# Patient Record
Sex: FEMALE | Race: WHITE | NOT HISPANIC OR LATINO | Employment: PART TIME | URBAN - METROPOLITAN AREA
[De-identification: names, ages, dates, MRNs, and addresses within clinical notes are randomized per-mention and may not be internally consistent; named-entity substitution may affect disease eponyms.]

---

## 2017-01-18 ENCOUNTER — TELEPHONE (OUTPATIENT)
Dept: FAMILY MEDICINE | Facility: OTHER | Age: 47
End: 2017-01-18

## 2017-01-18 DIAGNOSIS — I10 HTN, GOAL BELOW 140/90: Primary | ICD-10-CM

## 2017-01-18 NOTE — TELEPHONE ENCOUNTER
Summary:    Patient is due/failing the following:   BMP, BP CHECK, MAMMOGRAM and PAP    Action needed:   Patient needs office visit for PAP ., Patient needs non-fasting lab only appointment and schedule a mammogram     Type of outreach:    Phone, left message for patient to call back.     Questions for provider review:    None                                                                                                                                    Lidia Jhaveri       Chart routed to Care Team .      Panel Management Review      Patient has the following on her problem list:     Depression / Dysthymia review  PHQ-9 SCORE 2/8/2016 3/28/2016 10/17/2016   Total Score - - -   Total Score 8 4 13      Patient is due for:  PHQ9    Hypertension   Last three blood pressure readings:  BP Readings from Last 3 Encounters:   10/17/16 160/100   04/20/16 120/64   04/12/16 130/68     Blood pressure: Failed    HTN Guidelines:  Age 18-59 BP range:  Less than 140/90  Age 60-85 with Diabetes:  Less than 140/90  Age 60-85 without Diabetes:  less than 150/90      Composite cancer screening  Chart review shows that this patient is due/due soon for the following Pap Smear and Mammogram

## 2017-01-18 NOTE — Clinical Note
[facility]  [address] [place] [id number]  Phone: [phone]  January 26, 2017      Maranda Figueroa  [address] [facility] [address]      Dear Maranda,    We care about your health and have reviewed your health plan including your medical conditions, medications, and lab results.  Based on this review, it is recommended that you follow up regarding the following health topic(s):  -High Blood Pressure  -Breast Cancer Screening  -Cervical Cancer Screening    We recommend you take the following action(s):  -schedule a FREE FLOAT MA-ONLY BLOOD PRESSURE APPOINTMENT within the next 1-4 weeks.  -schedule a LAB ONLY APPOINTMENT to recheck your: BMP (basic metabolic panel) within the next 1-4 weeks.  If' you have had labs completed eslewhere, please let us know so we can update your records.    -schedule a MAMMOGRAM which is due. Please disregard this reminder if you have had this exam elsewhere within the last 1-2 years please let us know so we can update your records.  -schedule a PAP SMEAR EXAM which is due.  Please disregard this reminder if you have had this exam elsewhere within the last year.  It would be helpful for us to have a copy of your recent pap smear report to update your records.     Please call us at the [facility] - [phone] (or use [employer]) to address the above recommendations.     Thank you for trusting [facility] and we appreciate the opportunity to serve you.  We look forward to supporting your healthcare needs in the future.    Healthy Regards,    Your Health Care Team  [facility]

## 2017-04-10 ENCOUNTER — TELEPHONE (OUTPATIENT)
Dept: FAMILY MEDICINE | Facility: OTHER | Age: 47
End: 2017-04-10

## 2017-04-10 NOTE — TELEPHONE ENCOUNTER
Summary:    Patient is due/failing the following:   FOLLOW UP, MAMMOGRAM, PAP and PHQ9    Action needed:   Patient needs office visit for PAP and follow up., Patient needs to do PHQ9. and schedule a mammogram     Type of outreach:    Phone, spoke to patient.  patient unsure when she will have time for this due to her work schedule. Patient will call back to schedule.     Questions for provider review:    None                                                                                                                                    Lidia Jhaveri       Chart routed to Care Team .      Panel Management Review      Patient has the following on her problem list:     Depression / Dysthymia review  PHQ-9 SCORE 2/8/2016 3/28/2016 10/17/2016   Total Score - - -   Total Score 8 4 13      Patient is due for:  PHQ9    Hypertension   Last three blood pressure readings:  BP Readings from Last 3 Encounters:   10/17/16 (!) 160/100   04/20/16 120/64   04/12/16 130/68     Blood pressure: FAILED    HTN Guidelines:  Age 18-59 BP range:  Less than 140/90  Age 60-85 with Diabetes:  Less than 140/90  Age 60-85 without Diabetes:  less than 150/90      Composite cancer screening  Chart review shows that this patient is due/due soon for the following Pap Smear and Mammogram

## 2017-04-10 NOTE — LETTER
[facility]  [address] [place] [id number]  Phone: [phone]  April 10, 2017      Maranda Figueroa  [address] [facility] [address]      Dear Maranda,    We care about your health and have reviewed your health plan including your medical conditions, medications, and lab results.  Based on this review, it is recommended that you follow up regarding the following health topic(s):  -Depression  -Breast Cancer Screening  -Cervical Cancer Screening    We recommend you take the following action(s):  -schedule a FOLLOWUP APPOINTMENT.  -schedule a MAMMOGRAM which is due. Please disregard this reminder if you have had this exam elsewhere within the last 1-2 years please let us know so we can update your records.  -schedule a PAP SMEAR EXAM which is due.  Please disregard this reminder if you have had this exam elsewhere within the last year.  It would be helpful for us to have a copy of your recent pap smear report to update your records.  -Complete and return the attached PHQ-9 Form.  If your total score is greater than 9, please schedule a followup appointment.  If you answer Yes to question 9, call your clinic between the hours of 8 to 5.  You may also call the Suicide Hotline at 2-408-112-SPWK (8737) any time.     Please call us at the [facility] - [phone] (or use [employer]) to address the above recommendations.     Thank you for trusting [facility] and we appreciate the opportunity to serve you.  We look forward to supporting your healthcare needs in the future.    Healthy Regards,    Your Health Care Team  [facility]

## 2017-04-10 NOTE — LETTER
[facility]  [address] [place] [address]  Phone: [phone]  May 17, 2017      Maranda Figueroa  [address] [facility] [address]      Dear Maranda,    We care about your health and have reviewed your health plan including your medical conditions, medications, and lab results.  Based on this review, it is recommended that you follow up regarding the following health topic(s):  -Depression    We recommend you take the following action(s):  -Complete and return the attached PHQ-9 Form.  If your total score is greater than 9, please schedule a followup appointment.  If you answer Yes to question 9, call your clinic between the hours of 8 to 5.  You may also call the Suicide Hotline at 4-151-946-GMQI (1085) any time.     Please call us at the [facility] - [phone] (or use [employer]) to address the above recommendations.     Thank you for trusting [facility] and we appreciate the opportunity to serve you.  We look forward to supporting your healthcare needs in the future.    Healthy Regards,    Your Health Care Team  [facility]

## 2017-06-22 ENCOUNTER — TELEPHONE (OUTPATIENT)
Dept: FAMILY MEDICINE | Facility: OTHER | Age: 47
End: 2017-06-22

## 2017-06-22 DIAGNOSIS — K21.9 GASTROESOPHAGEAL REFLUX DISEASE WITHOUT ESOPHAGITIS: Primary | ICD-10-CM

## 2017-06-22 NOTE — TELEPHONE ENCOUNTER
Patient requesting refill of Omeprazole 40mg daily prescribed by Dr. Kenyon initially, not on current med list.  Please review and prescribe if desired.  Aiyana Nunez, [facility]  [phone]

## 2017-07-05 NOTE — PROGRESS NOTES
SUBJECTIVE:                                                    Maranda Figueroa is a 47 year old female who presents to clinic today for the following health issues:    Headache  Onset: x 6 day,     Description:   Location: bilateral in the frontal area, bilateral in the temporal area, kind of in ear. About two weeks before last Saturday patient states she randomly got a headache at work but that only lasted two days.  Character: throbbing pain, numb pain  Frequency:  Started 6 days ago and hasn't gone away. Patient states Saturday was the worst.  Duration:  6 days    Intensity: 7/10    Progression of Symptoms:  Constant but has got a little better    Accompanying Signs & Symptoms:  Stiff neck: no   Neck or upper back pain: YES- sore  Fever: no  Sinus pressure: no  Nausea or vomiting: yes- nauseous, no appetite.  Dizziness: no  Numbness: YES- face and fingers  Weakness: YES  Visual changes: YES- Left eye and ear hurt- Left eye is blurry.    History:   Head trauma: no   Family history of migraines: no- patient had stress headaches in her 20's but hasn't had them in years.  Previous tests for headaches: YES- years ago  Neurologist evaluations: no  Able to do daily activities: YES- has too.  Wake with a headaches: YES- woke up Saturday with this headache and everyday since.  Do headaches wake you up: YES  Daily pain medication use: YES- ibuprofen, excedrin  Work/school stressors/changes: no    Precipitating factors:   Does light make it worse: YES  Does sound make it worse: YES    Alleviating factors:  Does sleep help: YES- little bit, patient states she has to be in the right position.    Therapies Tried and outcome: Ibuprofen (Advil, Motrin) and Excedrin- not helping    Patient reports a history of headaches years ago but has not recently struggled with them, she reports that she woke with the headache and some neck tension on the left about 6 days ago and has had waxing and waning symptoms since that time. She has  "tried OTC treatments without improvement. She denies any other neurologic symptoms, has not had vision changes, weakness or speech or balance changes. She is on medication for her blood pressure and reports that with the exception of this morning has taken her medication every morning.     -------------------------------------    Problem list and histories reviewed & adjusted, as indicated.  Additional history: as documented    BP Readings from Last 3 Encounters:   07/06/17 (!) 140/104   10/17/16 (!) 160/100   04/20/16 120/64    Wt Readings from Last 3 Encounters:   07/06/17 157 lb 9.6 oz (71.5 kg)   10/17/16 163 lb (73.9 kg)   04/20/16 159 lb (72.1 kg)        Reviewed and updated as needed this visit by clinical staff       Reviewed and updated as needed this visit by Provider         ROS:  Constitutional, HEENT, cardiovascular, pulmonary, gi and gu systems are negative, except as otherwise noted.    OBJECTIVE:     BP (!) 140/104  Pulse 74  Temp 99.1  F (37.3  C) (Temporal)  Resp 16  Ht 5' 2.21\" (1.58 m)  Wt 157 lb 9.6 oz (71.5 kg)  LMP 06/27/2017 (Exact Date)  Breastfeeding? No  BMI 28.64 kg/m2  Body mass index is 28.64 kg/(m^2).  GENERAL: alert and no distress  EYES: Eyes grossly normal to inspection, PERRL and conjunctivae and sclerae normal  HENT: normal cephalic/atraumatic, ear canals and TM's normal, nose and mouth without ulcers or lesions, oropharynx clear and oral mucous membranes moist  NECK: no adenopathy, thyroid normal to palpation and neck muscle tension and tenderness to the left paracervical and trapezius   RESP: lungs clear to auscultation - no rales, rhonchi or wheezes  CV: regular rates and rhythm, peripheral pulses strong and no peripheral edema  MS: no gross musculoskeletal defects noted, no edema  NEURO: Normal strength and tone, sensory exam grossly normal, mentation intact, speech normal and gait normal including heel/toe/tandem walking    Diagnostic Test Results:  none "     ASSESSMENT/PLAN:     Hypertension; slightly worsened    Patient off medication this morning and having an acute headache      Plan:  No changes in the patient's current treatment plan        ICD-10-CM    1. Acute intractable tension-type headache G44.201 cyclobenzaprine (FLEXERIL) 5 MG tablet     rizatriptan (MAXALT) 5 MG tablet     traMADol (ULTRAM) 50 MG tablet   2. HTN, goal below 140/90 I10        I will have patient resume BP medication and follow up for a recheck of pressure in 2-3 days.     For headache and muscle tension I will treat with pain and migraine medication as well as a muscle relaxant and if not resolving or if recurring I would have her follow up for imaging to further evaluate. I did review red flag symptoms including vision changes, acute worsening headache, speech changes, weakness or other acute worsening I would have her go to the ER for evaluation.     See Patient Instructions    Maite Blunt PA-C  [facility]

## 2017-07-06 ENCOUNTER — OFFICE VISIT (OUTPATIENT)
Dept: FAMILY MEDICINE | Facility: OTHER | Age: 47
End: 2017-07-06
Payer: COMMERCIAL

## 2017-07-06 VITALS
TEMPERATURE: 99.1 F | RESPIRATION RATE: 16 BRPM | DIASTOLIC BLOOD PRESSURE: 104 MMHG | HEIGHT: 62 IN | HEART RATE: 74 BPM | BODY MASS INDEX: 29 KG/M2 | SYSTOLIC BLOOD PRESSURE: 140 MMHG | WEIGHT: 157.6 LBS

## 2017-07-06 DIAGNOSIS — G44.201 ACUTE INTRACTABLE TENSION-TYPE HEADACHE: Primary | ICD-10-CM

## 2017-07-06 DIAGNOSIS — I10 HTN, GOAL BELOW 140/90: ICD-10-CM

## 2017-07-06 PROCEDURE — 99214 OFFICE O/P EST MOD 30 MIN: CPT | Performed by: PHYSICIAN ASSISTANT

## 2017-07-06 RX ORDER — RIZATRIPTAN BENZOATE 5 MG/1
5-10 TABLET ORAL
Qty: 18 TABLET | Refills: 3 | Status: SHIPPED | OUTPATIENT
Start: 2017-07-06 | End: 2018-01-10

## 2017-07-06 RX ORDER — TRAMADOL HYDROCHLORIDE 50 MG/1
50-100 TABLET ORAL EVERY 6 HOURS PRN
Qty: 20 TABLET | Refills: 0 | Status: SHIPPED | OUTPATIENT
Start: 2017-07-06 | End: 2018-01-10

## 2017-07-06 RX ORDER — CYCLOBENZAPRINE HCL 5 MG
5 TABLET ORAL
Qty: 30 TABLET | Refills: 1 | Status: SHIPPED | OUTPATIENT
Start: 2017-07-06 | End: 2018-01-10

## 2017-07-06 ASSESSMENT — PAIN SCALES - GENERAL: PAINLEVEL: SEVERE PAIN (7)

## 2017-07-06 NOTE — PATIENT INSTRUCTIONS
I will have you use a headache medication and as needed pain medication to help headache resolve. For neck muscle tension and spasm I would have you take a muscle relaxant at bedtime as needed. If you are having recurrent or continuing headache next week I would have you call and I would get you set up for imaging to further evaluate.   I would have you take your blood pressure medication and follow up to recheck blood pressure in 1-2 days.    If you have any acute worsening of headache, vision changes, weakness or other acute worsening of symptoms go to the ER for evaluation.       * HEADACHE [unspecified]    The cause of your headache today is not clear, but it does not appear to be the sign of any serious illness.  Under stress, some people tense the muscles of their shoulder, neck and scalp without knowing it. If this condition lasts long enough, a TENSION HEADACHE can occur.  A MIGRAINE HEADACHE is caused by changes in blood flow to the brain. It can be mild or severe. A migraine attack may be triggered by emotional stress, hormone changes during the menstrual cycle, oral contraceptives, alcohol use, certain foods containing tyramine, eye strain, weather changes, missing meals, lack of sleep or oversleeping.  Other causes of headache include a viral illness, sinus, ear or throat infection, dental pain and TMJ (jaw joint) pain.  HOME CARE:    If you were given pain medicine for this headache, do not drive yourself home. Arrange for a ride, instead. When you get home, try to sleep. You should feel much better when you wake up.    If you are having nausea or vomiting, follow a light diet until your headache is relieved.    If you have a migraine type headache, use sunglasses when in the daylight or around bright indoor lighting until symptoms improve. Bright glaring light can worsen this kind of headache.  FOLLOW UP with your doctor if the headache is not better within the next 24 hours. If you have frequent  headaches you should discuss a treatment plan with your primary care doctor. By being aware of the earliest signs of headache, and starting treatment right away, you may be able to stop the pain yourself.  GET PROMPT MEDICAL ATTENTION if any of the following occur:    Worsening of your head pain or no improvement within 24 hours    Repeated vomiting (unable to keep liquids down)    Fever over 101 F (38.3 C)    Stiff neck    Extreme drowsiness, confusion or fainting    Weakness of an arm or leg or one side of the face    Difficulty with speech or vision    [id number] [address] [address]. All rights reserved. This information is not intended as a substitute for professional medical care. Always follow your healthcare professional's instructions.

## 2017-07-06 NOTE — NURSING NOTE
"Chief Complaint   Patient presents with     Headache     Panel Management     PAP, BMP, PHQ9       Initial BP (!) 138/94  Pulse 74  Temp 99.1  F (37.3  C) (Temporal)  Resp 16  Ht 5' 2.21\" (1.58 m)  Wt 157 lb 9.6 oz (71.5 kg)  LMP 06/27/2017 (Exact Date)  Breastfeeding? No  BMI 28.64 kg/m2 Estimated body mass index is 28.64 kg/(m^2) as calculated from the following:    Height as of this encounter: 5' 2.21\" (1.58 m).    Weight as of this encounter: 157 lb 9.6 oz (71.5 kg).  Medication Reconciliation: complete    "

## 2017-07-06 NOTE — MR AVS SNAPSHOT
After Visit Summary   7/6/2017    Maranda Figueroa    MRN: [id number]           Patient Information     Date Of Birth          1970        Visit Information        Provider Department      7/6/2017 8:40 AM Maite Blunt PA-C [facility]        Today's Diagnoses     Acute intractable tension-type headache    -  1    HTN, goal below 140/90          Care Instructions    I will have you use a headache medication and as needed pain medication to help headache resolve. For neck muscle tension and spasm I would have you take a muscle relaxant at bedtime as needed. If you are having recurrent or continuing headache next week I would have you call and I would get you set up for imaging to further evaluate.   I would have you take your blood pressure medication and follow up to recheck blood pressure in 1-2 days.    If you have any acute worsening of headache, vision changes, weakness or other acute worsening of symptoms go to the ER for evaluation.       * HEADACHE [unspecified]    The cause of your headache today is not clear, but it does not appear to be the sign of any serious illness.  Under stress, some people tense the muscles of their shoulder, neck and scalp without knowing it. If this condition lasts long enough, a TENSION HEADACHE can occur.  A MIGRAINE HEADACHE is caused by changes in blood flow to the brain. It can be mild or severe. A migraine attack may be triggered by emotional stress, hormone changes during the menstrual cycle, oral contraceptives, alcohol use, certain foods containing tyramine, eye strain, weather changes, missing meals, lack of sleep or oversleeping.  Other causes of headache include a viral illness, sinus, ear or throat infection, dental pain and TMJ (jaw joint) pain.  HOME CARE:    If you were given pain medicine for this headache, do not drive yourself home. Arrange for a ride, instead. When you get home, try to sleep. You should feel much better  when you wake up.    If you are having nausea or vomiting, follow a light diet until your headache is relieved.    If you have a migraine type headache, use sunglasses when in the daylight or around bright indoor lighting until symptoms improve. Bright glaring light can worsen this kind of headache.  FOLLOW UP with your doctor if the headache is not better within the next 24 hours. If you have frequent headaches you should discuss a treatment plan with your primary care doctor. By being aware of the earliest signs of headache, and starting treatment right away, you may be able to stop the pain yourself.  GET PROMPT MEDICAL ATTENTION if any of the following occur:    Worsening of your head pain or no improvement within 24 hours    Repeated vomiting (unable to keep liquids down)    Fever over 101 F (38.3 C)    Stiff neck    Extreme drowsiness, confusion or fainting    Weakness of an arm or leg or one side of the face    Difficulty with speech or vision    7881-9748 [place] [address]. All rights reserved. This information is not intended as a substitute for professional medical care. Always follow your healthcare professional's instructions.            Follow-ups after your visit        Follow-up notes from your care team     Return if symptoms worsen or fail to improve.      Who to contact     If you have questions or need follow up information about today's clinic visit or your schedule please contact [facility] directly at [phone].  Normal or non-critical lab and imaging results will be communicated to you by MyChart, letter or phone within 4 business days after the clinic has received the results. If you do not hear from us within 7 days, please contact the clinic through MyChart or phone. If you have a critical or abnormal lab result, we will notify you by phone as soon as possible.  Submit refill requests through [employer] or call your pharmacy and they  "will forward the refill request to us. Please allow 3 business days for your refill to be completed.          Additional Information About Your Visit        [employer] Information     daysoft lets you send messages to your doctor, view your test results, renew your prescriptions, schedule appointments and more. To sign up, go to [email or web address] . Click on \"Log in\" on the left side of the screen, which will take you to the Welcome page. Then click on \"Sign up Now\" on the right side of the page.     You will be asked to enter the access code listed below, as well as some personal information. Please follow the directions to create your username and password.     Your access code is: [email or web address]  Expires: 10/4/2017  9:26 AM     Your access code will  in 90 days. If you need help or a new code, please call your [facility] clinic or [phone].        Care EveryWhere ID     This is your [facility] EveryWhere ID. This could be used by other organizations to access your [facility] medical records  [id number]        Your Vitals Were     Pulse Temperature Respirations Height Last Period Breastfeeding?    74 99.1  F (37.3  C) (Temporal) 16 5' 2.21\" (1.58 m) 2017 (Exact Date) No    BMI (Body Mass Index)                   28.64 kg/m2            Blood Pressure from Last 3 Encounters:   17 (!) 140/104   10/17/16 (!) 160/100   16 120/64    Weight from Last 3 Encounters:   17 157 lb 9.6 oz (71.5 kg)   10/17/16 163 lb (73.9 kg)   16 159 lb (72.1 kg)              Today, you had the following     No orders found for display         Today's Medication Changes          These changes are accurate as of: 17  9:26 AM.  If you have any questions, ask your nurse or doctor.               Start taking these medicines.        Dose/Directions    cyclobenzaprine 5 MG tablet   Commonly known as:  FLEXERIL   Used for:  Acute intractable tension-type headache   Started by:  Maite Blunt PA-C        " Dose:  5 mg   Take 1 tablet (5 mg) by mouth nightly as needed for muscle spasms (neck tension)   Quantity:  30 tablet   Refills:  1       rizatriptan 5 MG tablet   Commonly known as:  MAXALT   Used for:  Acute intractable tension-type headache   Started by:  Maite Blunt PA-C        Dose:  5-10 mg   Take 1-2 tablets (5-10 mg) by mouth at onset of headache for migraine May repeat in 2 hours. Max 6 tablets/24 hours.   Quantity:  18 tablet   Refills:  3       traMADol 50 MG tablet   Commonly known as:  ULTRAM   Used for:  Acute intractable tension-type headache   Started by:  Maite Blunt PA-C        Dose:   mg   Take 1-2 tablets ( mg) by mouth every 6 hours as needed for breakthrough pain maximum 4 tablet(s) per day   Quantity:  20 tablet   Refills:  0            Where to get your medicines      These medications were sent to [facility]  [address] - [address]   [address] [place] [address]     Phone:  [phone]     cyclobenzaprine 5 MG tablet    rizatriptan 5 MG tablet         Some of these will need a paper prescription and others can be bought over the counter.  Ask your nurse if you have questions.     Bring a paper prescription for each of these medications     traMADol 50 MG tablet                Primary Care Provider Office Phone # Fax #    Diana Clara Matias [phone]        [facility] [address] [place] [address]        Equal Access to Services     MARIBELL BRANHAM AH: Hadii edvin ku hadasho Soomaali, waaxda luqadaha, qaybta kaalmada adeegyada, waxay idiin hayjeannie alfredo. So [facility] [phone].    ATENCIÓN: Si habla viraj, tiene a moreira disposición servicios gratuitos de asistencia lingüística. Llame al [phone].    We comply with applicable federal civil rights laws and [place] laws. We do not discriminate on the basis of race, color, national origin, age, disability sex, sexual orientation  or gender identity.            Thank you!     Thank you for choosing [facility]  for your care. Our goal is always to provide you with excellent care. Hearing back from our patients is one way we can continue to improve our services. Please take a few minutes to complete the written survey that you may receive in the mail after your visit with us. Thank you!             Your Updated Medication List - Protect others around you: Learn how to safely use, store and throw away your medicines at www.disposemymeds.org.          This list is accurate as of: 7/6/17  9:26 AM.  Always use your most recent med list.                   Brand Name Dispense Instructions for use Diagnosis    buPROPion 150 MG 12 hr tablet    WELLBUTRIN SR    60 tablet    Take 1 tablet once daily and increase to 1 tablet twice daily after 4 to 7 days    Major depressive disorder, recurrent episode, moderate (H), SILVIA (generalized anxiety disorder)       cyclobenzaprine 5 MG tablet    FLEXERIL    30 tablet    Take 1 tablet (5 mg) by mouth nightly as needed for muscle spasms (neck tension)    Acute intractable tension-type headache       diltiazem 180 MG 24 hr capsule    CARDIZEM CD    30 capsule    Take 1 capsule (180 mg) by mouth daily    Essential hypertension with goal blood pressure less than 140/90       omeprazole 20 MG CR capsule    priLOSEC    90 capsule    Take 1 capsule (20 mg) by mouth daily    Gastroesophageal reflux disease without esophagitis       rizatriptan 5 MG tablet    MAXALT    18 tablet    Take 1-2 tablets (5-10 mg) by mouth at onset of headache for migraine May repeat in 2 hours. Max 6 tablets/24 hours.    Acute intractable tension-type headache       traMADol 50 MG tablet    ULTRAM    20 tablet    Take 1-2 tablets ( mg) by mouth every 6 hours as needed for breakthrough pain maximum 4 tablet(s) per day    Acute intractable tension-type headache

## 2017-07-07 ASSESSMENT — ANXIETY QUESTIONNAIRES
IF YOU CHECKED OFF ANY PROBLEMS ON THIS QUESTIONNAIRE, HOW DIFFICULT HAVE THESE PROBLEMS MADE IT FOR YOU TO DO YOUR WORK, TAKE CARE OF THINGS AT HOME, OR GET ALONG WITH OTHER PEOPLE: SOMEWHAT DIFFICULT
7. FEELING AFRAID AS IF SOMETHING AWFUL MIGHT HAPPEN: MORE THAN HALF THE DAYS
1. FEELING NERVOUS, ANXIOUS, OR ON EDGE: MORE THAN HALF THE DAYS
6. BECOMING EASILY ANNOYED OR IRRITABLE: SEVERAL DAYS
5. BEING SO RESTLESS THAT IT IS HARD TO SIT STILL: SEVERAL DAYS
GAD7 TOTAL SCORE: 12
2. NOT BEING ABLE TO STOP OR CONTROL WORRYING: MORE THAN HALF THE DAYS
3. WORRYING TOO MUCH ABOUT DIFFERENT THINGS: MORE THAN HALF THE DAYS

## 2017-07-07 ASSESSMENT — PATIENT HEALTH QUESTIONNAIRE - PHQ9: 5. POOR APPETITE OR OVEREATING: MORE THAN HALF THE DAYS

## 2017-07-08 ASSESSMENT — PATIENT HEALTH QUESTIONNAIRE - PHQ9: SUM OF ALL RESPONSES TO PHQ QUESTIONS 1-9: 9

## 2017-07-08 ASSESSMENT — ANXIETY QUESTIONNAIRES: GAD7 TOTAL SCORE: 12

## 2017-07-13 ENCOUNTER — APPOINTMENT (OUTPATIENT)
Dept: CT IMAGING | Facility: CLINIC | Age: 47
End: 2017-07-13
Attending: PHYSICIAN ASSISTANT
Payer: COMMERCIAL

## 2017-07-13 ENCOUNTER — HOSPITAL ENCOUNTER (EMERGENCY)
Facility: CLINIC | Age: 47
Discharge: HOME | End: 2017-07-13
Attending: PHYSICIAN ASSISTANT | Admitting: PHYSICIAN ASSISTANT
Payer: COMMERCIAL

## 2017-07-13 ENCOUNTER — TELEPHONE (OUTPATIENT)
Dept: FAMILY MEDICINE | Facility: OTHER | Age: 47
End: 2017-07-13

## 2017-07-13 VITALS
TEMPERATURE: 97.1 F | SYSTOLIC BLOOD PRESSURE: 157 MMHG | BODY MASS INDEX: 28.82 KG/M2 | RESPIRATION RATE: 16 BRPM | DIASTOLIC BLOOD PRESSURE: 98 MMHG | OXYGEN SATURATION: 99 % | WEIGHT: 158.6 LBS | HEART RATE: 77 BPM

## 2017-07-13 VITALS — HEART RATE: 84 BPM | DIASTOLIC BLOOD PRESSURE: 100 MMHG | SYSTOLIC BLOOD PRESSURE: 146 MMHG

## 2017-07-13 DIAGNOSIS — R51.9 INTRACTABLE EPISODIC HEADACHE, UNSPECIFIED HEADACHE TYPE: ICD-10-CM

## 2017-07-13 LAB
ANION GAP SERPL CALCULATED.3IONS-SCNC: 12 MMOL/L (ref 3–14)
BASOPHILS # BLD AUTO: 0.1 10E9/L (ref 0–0.2)
BASOPHILS NFR BLD AUTO: 0.7 %
BUN SERPL-MCNC: 7 MG/DL (ref 7–30)
CALCIUM SERPL-MCNC: 8.8 MG/DL (ref 8.5–10.1)
CHLORIDE SERPL-SCNC: 103 MMOL/L (ref 94–109)
CO2 SERPL-SCNC: 25 MMOL/L (ref 20–32)
CREAT SERPL-MCNC: 0.59 MG/DL (ref 0.52–1.04)
CRP SERPL-MCNC: <2.9 MG/L (ref 0–8)
DIFFERENTIAL METHOD BLD: NORMAL
EOSINOPHIL # BLD AUTO: 0.1 10E9/L (ref 0–0.7)
EOSINOPHIL NFR BLD AUTO: 0.7 %
ERYTHROCYTE [DISTWIDTH] IN BLOOD BY AUTOMATED COUNT: 13 % (ref 10–15)
ERYTHROCYTE [SEDIMENTATION RATE] IN BLOOD BY WESTERGREN METHOD: 7 MM/H (ref 0–20)
GFR SERPL CREATININE-BSD FRML MDRD: ABNORMAL ML/MIN/1.7M2
GLUCOSE SERPL-MCNC: 105 MG/DL (ref 70–99)
HCT VFR BLD AUTO: 42.7 % (ref 35–47)
HGB BLD-MCNC: 14.3 G/DL (ref 11.7–15.7)
IMM GRANULOCYTES # BLD: 0 10E9/L (ref 0–0.4)
IMM GRANULOCYTES NFR BLD: 0.1 %
LYMPHOCYTES # BLD AUTO: 1.2 10E9/L (ref 0.8–5.3)
LYMPHOCYTES NFR BLD AUTO: 17.2 %
MCH RBC QN AUTO: 31.7 PG (ref 26.5–33)
MCHC RBC AUTO-ENTMCNC: 33.5 G/DL (ref 31.5–36.5)
MCV RBC AUTO: 95 FL (ref 78–100)
MONOCYTES # BLD AUTO: 0.6 10E9/L (ref 0–1.3)
MONOCYTES NFR BLD AUTO: 7.9 %
NEUTROPHILS # BLD AUTO: 5.1 10E9/L (ref 1.6–8.3)
NEUTROPHILS NFR BLD AUTO: 73.4 %
PLATELET # BLD AUTO: 244 10E9/L (ref 150–450)
POTASSIUM SERPL-SCNC: 4.1 MMOL/L (ref 3.4–5.3)
RBC # BLD AUTO: 4.51 10E12/L (ref 3.8–5.2)
SODIUM SERPL-SCNC: 140 MMOL/L (ref 133–144)
WBC # BLD AUTO: 7 10E9/L (ref 4–11)

## 2017-07-13 PROCEDURE — 86140 C-REACTIVE PROTEIN: CPT | Performed by: PHYSICIAN ASSISTANT

## 2017-07-13 PROCEDURE — 36415 COLL VENOUS BLD VENIPUNCTURE: CPT | Performed by: PHYSICIAN ASSISTANT

## 2017-07-13 PROCEDURE — 85025 COMPLETE CBC W/AUTO DIFF WBC: CPT | Performed by: PHYSICIAN ASSISTANT

## 2017-07-13 PROCEDURE — 80048 BASIC METABOLIC PNL TOTAL CA: CPT | Performed by: PHYSICIAN ASSISTANT

## 2017-07-13 PROCEDURE — 25000128 H RX IP 250 OP 636: Performed by: PHYSICIAN ASSISTANT

## 2017-07-13 PROCEDURE — 99284 EMERGENCY DEPT VISIT MOD MDM: CPT | Mod: 25 | Performed by: PHYSICIAN ASSISTANT

## 2017-07-13 PROCEDURE — 70450 CT HEAD/BRAIN W/O DYE: CPT

## 2017-07-13 PROCEDURE — 86618 LYME DISEASE ANTIBODY: CPT | Performed by: PHYSICIAN ASSISTANT

## 2017-07-13 PROCEDURE — 96361 HYDRATE IV INFUSION ADD-ON: CPT | Performed by: PHYSICIAN ASSISTANT

## 2017-07-13 PROCEDURE — 99284 EMERGENCY DEPT VISIT MOD MDM: CPT | Mod: Z6 | Performed by: PHYSICIAN ASSISTANT

## 2017-07-13 PROCEDURE — 96375 TX/PRO/DX INJ NEW DRUG ADDON: CPT | Performed by: PHYSICIAN ASSISTANT

## 2017-07-13 PROCEDURE — 96365 THER/PROPH/DIAG IV INF INIT: CPT | Performed by: PHYSICIAN ASSISTANT

## 2017-07-13 PROCEDURE — 85652 RBC SED RATE AUTOMATED: CPT | Performed by: PHYSICIAN ASSISTANT

## 2017-07-13 RX ORDER — SODIUM CHLORIDE 9 MG/ML
1000 INJECTION, SOLUTION INTRAVENOUS CONTINUOUS
Status: DISCONTINUED | OUTPATIENT
Start: 2017-07-13 | End: 2017-07-13 | Stop reason: HOSPADM

## 2017-07-13 RX ORDER — MAGNESIUM SULFATE 1 G/100ML
1 INJECTION INTRAVENOUS ONCE
Status: DISCONTINUED | OUTPATIENT
Start: 2017-07-13 | End: 2017-07-13

## 2017-07-13 RX ORDER — KETOROLAC TROMETHAMINE 30 MG/ML
30 INJECTION, SOLUTION INTRAMUSCULAR; INTRAVENOUS ONCE
Status: COMPLETED | OUTPATIENT
Start: 2017-07-13 | End: 2017-07-13

## 2017-07-13 RX ORDER — DEXAMETHASONE SODIUM PHOSPHATE 10 MG/ML
10 INJECTION, SOLUTION INTRAMUSCULAR; INTRAVENOUS ONCE
Status: COMPLETED | OUTPATIENT
Start: 2017-07-13 | End: 2017-07-13

## 2017-07-13 RX ADMIN — SODIUM CHLORIDE 1000 ML: 9 INJECTION, SOLUTION INTRAVENOUS at 14:31

## 2017-07-13 RX ADMIN — MAGNESIUM SULFATE IN DEXTROSE 1 G: 10 INJECTION, SOLUTION INTRAVENOUS at 14:39

## 2017-07-13 RX ADMIN — KETOROLAC TROMETHAMINE 30 MG: 30 INJECTION, SOLUTION INTRAMUSCULAR at 14:30

## 2017-07-13 RX ADMIN — DEXAMETHASONE SODIUM PHOSPHATE 10 MG: 10 INJECTION, SOLUTION INTRAMUSCULAR; INTRAVENOUS at 14:34

## 2017-07-13 ASSESSMENT — ENCOUNTER SYMPTOMS
CHILLS: 0
WEAKNESS: 0
TROUBLE SWALLOWING: 0
DIZZINESS: 0
SINUS PRESSURE: 0
FACIAL ASYMMETRY: 0
SHORTNESS OF BREATH: 0
NUMBNESS: 1
ABDOMINAL PAIN: 0
HEADACHES: 1
NAUSEA: 1
NECK PAIN: 1
NECK STIFFNESS: 0
VOMITING: 0
FEVER: 0

## 2017-07-13 NOTE — DISCHARGE INSTRUCTIONS
Please follow-up next week in the clinic for reevaluation of your headache.  Continue using your home headache medications as needed.  Take the next couple days off of work and rest at home.  Return to the emergency department immediately for worsening symptoms.     Thank you for choosing [facility] Emergency Department. It was a pleasure taking care of you today. If you have any questions, please call [phone].    Kena Bonilla PA-C    * HEADACHE    The cause of your headache today is not clear, but it does not appear to be the sign of any serious illness.  Under stress, some people tense the muscles of their shoulder, neck and scalp without knowing it. If this condition lasts long enough, a TENSION HEADACHE can occur.  A MIGRAINE HEADACHE is caused by changes in blood flow to the brain. It can be mild or severe. A migraine attack may be triggered by emotional stress, hormone changes during the menstrual cycle, oral contraceptives, alcohol use, certain foods containing tyramine, eye strain, weather changes, missing meals, lack of sleep or oversleeping.  Other causes of headache include a viral illness, sinus, ear or throat infection, dental pain and TMJ (jaw joint) pain.  HOME CARE:    If you were given pain medicine for this headache, do not drive yourself home. Arrange for a ride, instead. When you get home, try to sleep. You should feel much better when you wake up.    If you are having nausea or vomiting, follow a light diet until your headache is relieved.    If you have a migraine type headache, use sunglasses when in the daylight or around bright indoor lighting until symptoms improve. Bright glaring light can worsen this kind of headache.  FOLLOW UP with your doctor if the headache is not better within the next 24 hours. If you have frequent headaches you should discuss a treatment plan with your primary care doctor. By being aware of the earliest signs of headache, and starting treatment  right away, you may be able to stop the pain yourself.  GET PROMPT MEDICAL ATTENTION if any of the following occur:    Worsening of your head pain or no improvement within 24 hours    Repeated vomiting (unable to keep liquids down)    Fever over 101 F (38.3 C)    Stiff neck    Extreme drowsiness, confusion or fainting    Weakness of an arm or leg or one side of the face    Difficulty with speech or vision

## 2017-07-13 NOTE — TELEPHONE ENCOUNTER
[facility] phone call message- patient reporting a symptom:    Symptom or request: headache    Duration (how long have symptoms been present): one month  Have you been treated for this before? Yes    Additional comments: do you think she should get in for a MRI today/    Call taken on 7/13/2017 at 11:14 AM by Izzy Gaines

## 2017-07-13 NOTE — ED AVS SNAPSHOT
[facility] Emergency Department    [address] [place] [address]    Phone:  [phone]    Fax:  [phone]                                       Maranda Figueroa   MRN: [id number]    Department:  [facility] Emergency Department   Date of Visit:  7/13/2017           After Visit Summary Signature Page     I have received my discharge instructions, and my questions have been answered. I have discussed any challenges I see with this plan with the nurse or doctor.    ..........................................................................................................................................  Patient/Patient Representative Signature      ..........................................................................................................................................  Patient Representative Print Name and Relationship to Patient    ..................................................               ................................................  Date                                            Time    ..........................................................................................................................................  Reviewed by Signature/Title    ...................................................              ..............................................  Date                                                            Time

## 2017-07-13 NOTE — ED PROVIDER NOTES
History     Chief Complaint   Patient presents with     Headache     HPI  Maranda Figueroa is a 47 year old female who presents to the emergency department complaining of a headache.  She has had this headache constantly for the last couple of weeks, but it waxes and wanes in severity.  She describes it as a tight, numb feeling on the left side of her face around her eye.  The face has a weird numb feeling and her left eye seems to have a little bit of blurry vision.  The pain does not radiate.  She does have mild left ear pain as well.  She has a history of surgeries on the ear due to deafness.  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 She was seen in the clinic one week ago for this headache and prescribed Maxalt, Ultram, and Flexeril.  She took Ultram and Maxalt today without relief. Headache had almost resolved yesterday and she thought she was on the mend, but it never fully went away and today it is much worse. She has had occasional nausea but no vomiting with the headache.  She denies fever or chills.  She has occasional numbness in her left arm when she sleeps that she says has been ongoing for a while.  Denies numbness in her arms or legs currently or weakness in extremities.  She notes some neck pain as well but no neck stiffness.  She does not usually have headaches.  2 weeks prior to the onset of this headache she had a sharp, left-sided frontal headache that lasted 2 days and then went away on its own.    I have reviewed the Medications, Allergies, Past Medical and Surgical History, and Social History in the Epic system.    Allergies:   Allergies   Allergen Reactions     Penicillins      Causes nausea with emesis         No current facility-administered medications on file prior to encounter.   Current Outpatient Prescriptions on File Prior to Encounter:  cyclobenzaprine (FLEXERIL) 5 MG tablet Take 1 tablet (5 mg) by mouth nightly as needed for muscle spasms (neck tension)   rizatriptan (MAXALT) 5 MG tablet Take  "1-2 tablets (5-10 mg) by mouth at onset of headache for migraine May repeat in 2 hours. Max 6 tablets/24 hours.   traMADol (ULTRAM) 50 MG tablet Take 1-2 tablets ( mg) by mouth every 6 hours as needed for breakthrough pain maximum 4 tablet(s) per day   omeprazole (PRILOSEC) 20 MG CR capsule Take 1 capsule (20 mg) by mouth daily   diltiazem (CARDIZEM CD) 180 MG 24 hr CD capsule Take 1 capsule (180 mg) by mouth daily   buPROPion (WELLBUTRIN SR) 150 MG 12 hr tablet Take 1 tablet once daily and increase to 1 tablet twice daily after 4 to 7 days       Patient Active Problem List   Diagnosis     Anxiety state     CARDIOVASCULAR SCREENING; LDL GOAL LESS THAN 160     Major depression in complete remission (H)     Hearing impairment     Insomnia     HTN, goal below 140/90     Vitamin D deficiency     Gastroesophageal reflux disease without esophagitis     Panic attack       Past Surgical History:   Procedure Laterality Date     ESOPHAGOSCOPY, GASTROSCOPY, DUODENOSCOPY (EGD), COMBINED N/A 10/5/2015    Procedure: COMBINED ESOPHAGOSCOPY, GASTROSCOPY, DUODENOSCOPY (EGD), BIOPSY SINGLE OR MULTIPLE;  Surgeon: Calin Kenyon MD;  Location: [facility]     OPEN REDUCTION INTERNAL FIXATION RODDING INTRAMEDULLARY TIBIA  11/6/2011    Procedure:OPEN REDUCTION INTERNAL FIXATION RODDING INTRAMEDULLARY TIBIA; Open Reduction internal fixation rodding right tibia; Surgeon:JUNO TUTTLE; Location:[facility]     TYMPANOPLASTY, RT/LT      Tympanoplasty/LT       Social History   Substance Use Topics     Smoking status: Former Smoker     Quit date: 6/1/1994     Smokeless tobacco: Not on file     Alcohol use 1.0 oz/week      Comment: Occ.       Most Recent Immunizations   Administered Date(s) Administered     TDAP Vaccine (Adacel) 11/19/2013       BMI: Estimated body mass index is 28.82 kg/(m^2) as calculated from the following:    Height as of 7/6/17: 1.58 m (5' 2.21\").    Weight as of this encounter: 71.9 kg (158 lb 9.6 oz).      Review of " Systems   Constitutional: Negative for chills and fever.   HENT: Positive for ear pain (left ear) and hearing loss (left ear, chronic). Negative for sinus pressure and trouble swallowing.    Eyes: Positive for visual disturbance (left eye blurry).   Respiratory: Negative for shortness of breath.    Cardiovascular: Negative for chest pain.   Gastrointestinal: Positive for nausea (occasional, not currently). Negative for abdominal pain and vomiting.   Musculoskeletal: Positive for neck pain. Negative for neck stiffness.   Neurological: Positive for numbness (left face, intermittent left arm) and headaches. Negative for dizziness, facial asymmetry and weakness.   All other systems reviewed and are negative.      Physical Exam   BP: (!) 172/106  Heart Rate: 94  Temp: 97.1  F (36.2  C)  Resp: 16  Weight: 71.9 kg (158 lb 9.6 oz)  SpO2: 97 %  Physical Exam   Constitutional: She is oriented to person, place, and time. She appears well-developed and well-nourished. No distress.   HENT:   Head: Normocephalic and atraumatic.   Right Ear: Tympanic membrane and external ear normal.   Left Ear: External ear normal.   Nose: Nose normal.   Mouth/Throat: Uvula is midline and oropharynx is clear and moist. No oropharyngeal exudate.   Left TM and canal with post-surgical changes, no evidence of infection   Eyes: Conjunctivae and EOM are normal. Pupils are equal, round, and reactive to light. No scleral icterus.   Neck: Normal range of motion. Neck supple.   Cardiovascular: Normal rate, regular rhythm, normal heart sounds and intact distal pulses.    Pulmonary/Chest: Effort normal and breath sounds normal. No respiratory distress.   Abdominal: Soft. Bowel sounds are normal. There is no tenderness.   Musculoskeletal: She exhibits no edema or tenderness.   Neurological: She is alert and oriented to person, place, and time. She has normal strength. A cranial nerve deficit (patient reports abnormal sensation on left side of face, left ear  hearing deficit (normal for patient), otherwise CN intact) and sensory deficit (left face on lateral aspect of eye) is present. Coordination normal.   Skin: Skin is warm and dry. No rash noted. She is not diaphoretic.   Psychiatric: She has a normal mood and affect.   Nursing note and vitals reviewed.      ED Course     ED Course     Procedures  None    Results for orders placed or performed during the hospital encounter of 07/13/17 (from the past 24 hour(s))   CRP inflammation   Result Value Ref Range    CRP Inflammation <2.9 0.0 - 8.0 mg/L   Erythrocyte sedimentation rate auto   Result Value Ref Range    Sed Rate 7 0 - 20 mm/h   CBC with platelets differential   Result Value Ref Range    WBC 7.0 4.0 - 11.0 10e9/L    RBC Count 4.51 3.8 - 5.2 10e12/L    Hemoglobin 14.3 11.7 - 15.7 g/dL    Hematocrit 42.7 35.0 - 47.0 %    MCV 95 78 - 100 fl    MCH 31.7 26.5 - 33.0 pg    MCHC 33.5 31.5 - 36.5 g/dL    RDW 13.0 10.0 - 15.0 %    Platelet Count 244 150 - 450 10e9/L    Diff Method Automated Method     % Neutrophils 73.4 %    % Lymphocytes 17.2 %    % Monocytes 7.9 %    % Eosinophils 0.7 %    % Basophils 0.7 %    % Immature Granulocytes 0.1 %    Absolute Neutrophil 5.1 1.6 - 8.3 10e9/L    Absolute Lymphocytes 1.2 0.8 - 5.3 10e9/L    Absolute Monocytes 0.6 0.0 - 1.3 10e9/L    Absolute Eosinophils 0.1 0.0 - 0.7 10e9/L    Absolute Basophils 0.1 0.0 - 0.2 10e9/L    Abs Immature Granulocytes 0.0 0 - 0.4 10e9/L   CT Head w/o Contrast    Narrative    CT HEAD W/O CONTRAST  7/13/2017 2:01 PM    HISTORY: Headache and left facial numbness.    TECHNIQUE: Scans were obtained through the head without IV contrast.   Radiation dose for this scan was reduced using automated exposure  control, adjustment of the mA and/or kV according to patient size, or  iterative reconstruction technique.    COMPARISON: None.    FINDINGS: No hemorrhage, mass lesion, or focal area of acute  infarction identified. Paranasal sinuses are normal. No  bony  abnormality.      Impression    IMPRESSION: Negative CT scan of the head.    VANESSA JONES MD   Basic metabolic panel   Result Value Ref Range    Sodium 140 133 - 144 mmol/L    Potassium 4.1 3.4 - 5.3 mmol/L    Chloride 103 94 - 109 mmol/L    Carbon Dioxide 25 20 - 32 mmol/L    Anion Gap 12 3 - 14 mmol/L    Glucose 105 (H) 70 - 99 mg/dL    Urea Nitrogen 7 7 - 30 mg/dL    Creatinine 0.59 0.52 - 1.04 mg/dL    GFR Estimate >90  Non  GFR Calc   >60 mL/min/1.7m2    GFR Estimate If Black >90   GFR Calc   >60 mL/min/1.7m2    Calcium 8.8 8.5 - 10.1 mg/dL       Medications   0.9% sodium chloride BOLUS (0 mLs Intravenous Stopped 7/13/17 1606)     Followed by   0.9% sodium chloride infusion (not administered)   dexamethasone (DECADRON) injection 10 mg (10 mg Intravenous Given 7/13/17 1434)   ketorolac (TORADOL) injection 30 mg (30 mg Intravenous Given 7/13/17 1430)   magnesium sulfate 1 g in D5W intermittent infusion (0 g Intravenous Stopped 7/13/17 1510)        Assessments & Plan (with Medical Decision Making)  Maranda Figueroa is a 47 year old female who presented to the ED complaining of a headache.  This has lasted for the last 2 weeks and is associated with left eye blurriness and left facial tingling.  Denies weakness in extremities or persistent numbness, though she has had intermittent left arm numbness. Baseline deafness in left ear, history of surgery to the ear. Denies fever, neck stiffness, nausea or vomiting.  On arrival to the ED she was hypertensive at 172/106.  She reported just having taken her home diltiazem for her blood pressure, and BP did improve over course of ED stay.  She had a generally unremarkable exam other than reported left-sided facial sensation differences in comparison to the right, though sensation was still intact.  She had full strength and sensation in all extremities.  Her symptoms were concerning for an acute intracranial process so a CT of her head  was obtained and this was normal.  We also checked a CBC and BMP and these were normal.  I considered temporal arteritis as a possibility but her ESR was normal as was her CRP.  Due to her facial tingling, a Lyme titer was obtained and is pending.  The patient's headache was treated with IV fluids, Decadron, Toradol, and magnesium.  She did not want to have to find a ride so she was not given any sedating medications.  She reported pain had improved from an 8/10 to a 3/10 on reassessment.  She still complained of her left face feeling tingly however.  Because of this I strongly recommended obtaining an MRI to further evaluate because there may be an acute neurological cause not seen on CT scan to explain this but patient declined, saying she would like to go home at this time and see if things continue to improve. I did recommend that she follow up next week in the clinic with her PCP for reevaluation of this headache and to discuss indication of MRI if symptoms haven't improved by then.  She was agreeable to doing this and an appointment was subsequently scheduled for her.  I advised continued use of her home abortive headache medications.  She should also take the next couple days off of work and rest.  She was given instructions on when to return to the ED.  All questions were answered and the patient was agreeable to this plan.       I have reviewed the nursing notes.    I have reviewed the findings, diagnosis, plan and need for follow up with the patient.      Discharge Medication List as of 7/13/2017  4:06 PM          Final diagnoses:   Intractable episodic headache, unspecified headache type       7/13/2017   [facility] EMERGENCY DEPARTMENT     Kena Bonilla PA-C  07/13/17 9182

## 2017-07-13 NOTE — ED NOTES
Has had a headache for about two weeks, seen in clinic about 1 week ago was given flexeril, ultram, and maxalt. Comes here today because her headache has not gotten any better and is worse today.

## 2017-07-13 NOTE — ED NOTES
Pt back from CT, Had to be redrawn from lab as her blood hemolyzed. Will now give her meds. Given ice water as per her request.

## 2017-07-13 NOTE — LETTER
[facility] EMERGENCY DEPARTMENT  [address] [place] [address]  [phone]  Dept: [phone]      7/13/2017    Re: Maranda Figueroa      TO WHOM IT MAY CONCERN:    Maranda Figueroa  was seen on 7/13/17.  Please excuse her from work on 7/14 due to illness.  She can return to work as soon as she is feeling better.    Cordially,          Kena Bonilla PA-C   [facility] EMERGENCY DEPARTMENT

## 2017-07-13 NOTE — ED AVS SNAPSHOT
[facility] Emergency Department    [address] [place] [address]    Phone:  [phone]    Fax:  [phone]                                       Maranda Figueroa   MRN: [id number]    Department:  [facility] Emergency Department   Date of Visit:  7/13/2017           Patient Information     Date Of Birth          1970        Your diagnoses for this visit were:     Intractable episodic headache, unspecified headache type        You were seen by Kena Bonilla PA-C.      Follow-up Information     Follow up with Diana Matias MD In 5 days.    Specialty:  Family Practice    Why:  For ER follow up    Contact information:    [facility]  [address] [place] [address] [phone]          Follow up with [facility] Emergency Department.    Specialty:  EMERGENCY MEDICINE    Why:  If symptoms worsen    Contact information:    [address] [place] [address] [address] [place] [address] [phone]    Additional information:    From [address]: Exit at [address] on south side of [place]. Turn right on [address]. Turn left at stoplight on [address]. [facility] will be in view two blocks ahead        Discharge Instructions       Please follow-up next week in the clinic for reevaluation of your headache.  Continue using your home headache medications as needed.  Take the next couple days off of work and rest at home.  Return to the emergency department immediately for worsening symptoms.     Thank you for choosing [facility]'s Emergency Department. It was a pleasure taking care of you today. If you have any questions, please call [phone].    Kena Bonilla PA-C    * HEADACHE    The cause of your headache today is not clear, but it does not appear to be the sign of any serious illness.  Under stress, some people tense the muscles of their shoulder, neck and scalp without knowing it. If this condition lasts long enough, a  TENSION HEADACHE can occur.  A MIGRAINE HEADACHE is caused by changes in blood flow to the brain. It can be mild or severe. A migraine attack may be triggered by emotional stress, hormone changes during the menstrual cycle, oral contraceptives, alcohol use, certain foods containing tyramine, eye strain, weather changes, missing meals, lack of sleep or oversleeping.  Other causes of headache include a viral illness, sinus, ear or throat infection, dental pain and TMJ (jaw joint) pain.  HOME CARE:    If you were given pain medicine for this headache, do not drive yourself home. Arrange for a ride, instead. When you get home, try to sleep. You should feel much better when you wake up.    If you are having nausea or vomiting, follow a light diet until your headache is relieved.    If you have a migraine type headache, use sunglasses when in the daylight or around bright indoor lighting until symptoms improve. Bright glaring light can worsen this kind of headache.  FOLLOW UP with your doctor if the headache is not better within the next 24 hours. If you have frequent headaches you should discuss a treatment plan with your primary care doctor. By being aware of the earliest signs of headache, and starting treatment right away, you may be able to stop the pain yourself.  GET PROMPT MEDICAL ATTENTION if any of the following occur:    Worsening of your head pain or no improvement within 24 hours    Repeated vomiting (unable to keep liquids down)    Fever over 101 F (38.3 C)    Stiff neck    Extreme drowsiness, confusion or fainting    Weakness of an arm or leg or one side of the face    Difficulty with speech or vision      Future Appointments        Provider Department Dept Phone Center    7/18/2017 8:30 AM Diana Matias MD, MD [facility] [phone] [facility]      24 Hour Appointment Hotline       To make an appointment at any [facility], call [phone] ([phone]). If you don't  have a family doctor or clinic, we will help you find one. [facility] are conveniently located to serve the needs of you and your family.             Review of your medicines      Our records show that you are taking the medicines listed below. If these are incorrect, please call your family doctor or clinic.        Dose / Directions Last dose taken    buPROPion 150 MG 12 hr tablet   Commonly known as:  WELLBUTRIN SR   Quantity:  60 tablet        Take 1 tablet once daily and increase to 1 tablet twice daily after 4 to 7 days   Refills:  5        cyclobenzaprine 5 MG tablet   Commonly known as:  FLEXERIL   Dose:  5 mg   Quantity:  30 tablet        Take 1 tablet (5 mg) by mouth nightly as needed for muscle spasms (neck tension)   Refills:  1        diltiazem 180 MG 24 hr capsule   Commonly known as:  CARDIZEM CD   Dose:  180 mg   Quantity:  30 capsule        Take 1 capsule (180 mg) by mouth daily   Refills:  5        omeprazole 20 MG CR capsule   Commonly known as:  priLOSEC   Dose:  20 mg   Quantity:  90 capsule        Take 1 capsule (20 mg) by mouth daily   Refills:  1        rizatriptan 5 MG tablet   Commonly known as:  MAXALT   Dose:  5-10 mg   Quantity:  18 tablet        Take 1-2 tablets (5-10 mg) by mouth at onset of headache for migraine May repeat in 2 hours. Max 6 tablets/24 hours.   Refills:  3        traMADol 50 MG tablet   Commonly known as:  ULTRAM   Dose:   mg   Quantity:  20 tablet        Take 1-2 tablets ( mg) by mouth every 6 hours as needed for breakthrough pain maximum 4 tablet(s) per day   Refills:  0                Procedures and tests performed during your visit     Basic metabolic panel    CBC with platelets differential    CRP inflammation    CT Head w/o Contrast    Erythrocyte sedimentation rate auto    Lyme Disease Angelica with reflex to WB Serum      Orders Needing Specimen Collection     None      Pending Results     Date and Time Order Name Status Description    7/13/2017  "1408 Lyme Disease Angelica with reflex to WB Serum In process             Pending Culture Results     No orders found from 2017 to 2017.            Pending Results Instructions     If you had any lab results that were not finalized at the time of your Discharge, you can call the ED Lab Result RN at [phone]. You will be contacted by this team for any positive Lab results or changes in treatment. The nurses are available 7 days a week from 10A to 6:30P.  You can leave a message 24 hours per day and they will return your call.        Thank you for choosing [facility]       Thank you for choosing [facility] for your care. Our goal is always to provide you with excellent care. Hearing back from our patients is one way we can continue to improve our services. Please take a few minutes to complete the written survey that you may receive in the mail after you visit with us. Thank you!        [employer] Information     [employer] lets you send messages to your doctor, view your test results, renew your prescriptions, schedule appointments and more. To sign up, go to [email or web address] . Click on \"Log in\" on the left side of the screen, which will take you to the Welcome page. Then click on \"Sign up Now\" on the right side of the page.     You will be asked to enter the access code listed below, as well as some personal information. Please follow the directions to create your username and password.     Your access code is: [email or web address]  Expires: 10/4/2017  9:26 AM     Your access code will  in 90 days. If you need help or a new code, please call your [facility] clinic or [phone].        Care EveryWhere ID     This is your Care EveryWhere ID. This could be used by other organizations to access your [facility] medical records  [id number]        Equal Access to Services     MARIBELL BRANHAM : travis Shah, henrik samson. So [facility] " [phone].    ATENCIÓN: Si habla español, tiene a moreira disposición servicios gratuitos de asistencia lingüística. Llame al [phone].    We comply with applicable federal civil rights laws and [place] laws. We do not discriminate on the basis of race, color, national origin, age, disability sex, sexual orientation or gender identity.            After Visit Summary       This is your record. Keep this with you and show to your community pharmacist(s) and doctor(s) at your next visit.

## 2017-07-13 NOTE — TELEPHONE ENCOUNTER
"Patient walked into clinic with concern of headache.  States that is started about 2 weeks ago and not improving.  Was seen in clinic on 07/06/2017.  Given   Current Outpatient Prescriptions   Medication     cyclobenzaprine (FLEXERIL) 5 MG tablet     rizatriptan (MAXALT) 5 MG tablet     traMADol (ULTRAM) 50 MG tablet     With little to no improvement. Dim lights help some.    /100 pulse 84  Has also tried ice with no improvement.  Washing hard even felt weird this am.  Left side of face feels \"tingly or numb\"  Has been drinking ok water throughout day.  Normal urination.    Rate headache as pretty bad 8/10    RECOMMENDED DISPOSITION:  To ED/UC for evaluation, another person to drive -   Will comply with recommendation: yes   If further questions/concerns or if Sx do not improve, worsen or new Sx develop, call your PCP or [facility] as soon as possible.    NOTES:  Disposition was determined by the first positive assessment question, therefore all previous assessment questions were negative.     Guideline used:headaches  Telephone Triage Protocols for Nurses, Fourth Edition, Rolanda Grant RN        "

## 2017-07-14 LAB — B BURGDOR IGG+IGM SER QL: 0.05 (ref 0–0.89)

## 2017-07-22 ENCOUNTER — HOSPITAL ENCOUNTER (EMERGENCY)
Facility: CLINIC | Age: 47
Discharge: HOME | End: 2017-07-22
Attending: EMERGENCY MEDICINE | Admitting: EMERGENCY MEDICINE
Payer: COMMERCIAL

## 2017-07-22 ENCOUNTER — NURSE TRIAGE (OUTPATIENT)
Dept: NURSING | Facility: CLINIC | Age: 47
End: 2017-07-22

## 2017-07-22 VITALS
DIASTOLIC BLOOD PRESSURE: 119 MMHG | BODY MASS INDEX: 28.16 KG/M2 | TEMPERATURE: 98.5 F | WEIGHT: 155 LBS | SYSTOLIC BLOOD PRESSURE: 166 MMHG | HEART RATE: 103 BPM | OXYGEN SATURATION: 98 % | RESPIRATION RATE: 18 BRPM

## 2017-07-22 DIAGNOSIS — S61.451A CAT BITE OF RIGHT HAND INCLUDING FINGERS WITH INFECTION, INITIAL ENCOUNTER: ICD-10-CM

## 2017-07-22 DIAGNOSIS — L08.9 CAT BITE OF RIGHT HAND INCLUDING FINGERS WITH INFECTION, INITIAL ENCOUNTER: ICD-10-CM
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
## 2017-07-22 DIAGNOSIS — S61.259A CAT BITE OF RIGHT HAND INCLUDING FINGERS WITH INFECTION, INITIAL ENCOUNTER: ICD-10-CM

## 2017-07-22 DIAGNOSIS — S61.250A OPEN BITE OF RIGHT INDEX FINGER WITHOUT DAMAGE TO NAIL, INITIAL ENCOUNTER: ICD-10-CM

## 2017-07-22 DIAGNOSIS — L08.9 LOCAL SKIN INFECTION: ICD-10-CM
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
## 2017-07-22 LAB
ANION GAP SERPL CALCULATED.3IONS-SCNC: 10 MMOL/L (ref 3–14)
BASOPHILS # BLD AUTO: 0 10E9/L (ref 0–0.2)
BASOPHILS NFR BLD AUTO: 0.3 %
BUN SERPL-MCNC: 11 MG/DL (ref 7–30)
CALCIUM SERPL-MCNC: 8.6 MG/DL (ref 8.5–10.1)
CHLORIDE SERPL-SCNC: 104 MMOL/L (ref 94–109)
CO2 SERPL-SCNC: 25 MMOL/L (ref 20–32)
CREAT SERPL-MCNC: 0.72 MG/DL (ref 0.52–1.04)
CRP SERPL-MCNC: 7.4 MG/L (ref 0–8)
DIFFERENTIAL METHOD BLD: ABNORMAL
EOSINOPHIL # BLD AUTO: 0.1 10E9/L (ref 0–0.7)
EOSINOPHIL NFR BLD AUTO: 0.5 %
ERYTHROCYTE [DISTWIDTH] IN BLOOD BY AUTOMATED COUNT: 13.3 % (ref 10–15)
ERYTHROCYTE [SEDIMENTATION RATE] IN BLOOD BY WESTERGREN METHOD: 8 MM/H (ref 0–20)
GFR SERPL CREATININE-BSD FRML MDRD: 88 ML/MIN/1.7M2
GLUCOSE SERPL-MCNC: 106 MG/DL (ref 70–99)
HCT VFR BLD AUTO: 44.6 % (ref 35–47)
HGB BLD-MCNC: 14.9 G/DL (ref 11.7–15.7)
IMM GRANULOCYTES # BLD: 0 10E9/L (ref 0–0.4)
IMM GRANULOCYTES NFR BLD: 0.2 %
LYMPHOCYTES # BLD AUTO: 1.4 10E9/L (ref 0.8–5.3)
LYMPHOCYTES NFR BLD AUTO: 12 %
MCH RBC QN AUTO: 31.8 PG (ref 26.5–33)
MCHC RBC AUTO-ENTMCNC: 33.4 G/DL (ref 31.5–36.5)
MCV RBC AUTO: 95 FL (ref 78–100)
MONOCYTES # BLD AUTO: 1.1 10E9/L (ref 0–1.3)
MONOCYTES NFR BLD AUTO: 9.6 %
NEUTROPHILS # BLD AUTO: 9.1 10E9/L (ref 1.6–8.3)
NEUTROPHILS NFR BLD AUTO: 77.4 %
PLATELET # BLD AUTO: 274 10E9/L (ref 150–450)
POTASSIUM SERPL-SCNC: 3.9 MMOL/L (ref 3.4–5.3)
RBC # BLD AUTO: 4.69 10E12/L (ref 3.8–5.2)
SODIUM SERPL-SCNC: 139 MMOL/L (ref 133–144)
WBC # BLD AUTO: 11.7 10E9/L (ref 4–11)

## 2017-07-22 PROCEDURE — 25000128 H RX IP 250 OP 636: Performed by: EMERGENCY MEDICINE

## 2017-07-22 PROCEDURE — 80048 BASIC METABOLIC PNL TOTAL CA: CPT | Performed by: EMERGENCY MEDICINE

## 2017-07-22 PROCEDURE — 96365 THER/PROPH/DIAG IV INF INIT: CPT | Performed by: EMERGENCY MEDICINE

## 2017-07-22 PROCEDURE — 85652 RBC SED RATE AUTOMATED: CPT | Performed by: EMERGENCY MEDICINE

## 2017-07-22 PROCEDURE — 99284 EMERGENCY DEPT VISIT MOD MDM: CPT | Mod: Z6 | Performed by: EMERGENCY MEDICINE

## 2017-07-22 PROCEDURE — 86140 C-REACTIVE PROTEIN: CPT | Performed by: EMERGENCY MEDICINE

## 2017-07-22 PROCEDURE — 99284 EMERGENCY DEPT VISIT MOD MDM: CPT | Mod: 25 | Performed by: EMERGENCY MEDICINE

## 2017-07-22 PROCEDURE — 85025 COMPLETE CBC W/AUTO DIFF WBC: CPT | Performed by: EMERGENCY MEDICINE

## 2017-07-22 PROCEDURE — 25000132 ZZH RX MED GY IP 250 OP 250 PS 637: Performed by: EMERGENCY MEDICINE

## 2017-07-22 RX ORDER — IBUPROFEN 600 MG/1
600 TABLET, FILM COATED ORAL ONCE
Status: COMPLETED | OUTPATIENT
Start: 2017-07-22 | End: 2017-07-22

## 2017-07-22 RX ORDER — HYDROCODONE BITARTRATE AND ACETAMINOPHEN 5; 325 MG/1; MG/1
2 TABLET ORAL ONCE
Status: COMPLETED | OUTPATIENT
Start: 2017-07-22 | End: 2017-07-22

## 2017-07-22 RX ORDER — IBUPROFEN 600 MG/1
600 TABLET, FILM COATED ORAL EVERY 6 HOURS PRN
Qty: 40 TABLET | Refills: 0 | Status: SHIPPED | OUTPATIENT
Start: 2017-07-22 | End: 2021-08-20

## 2017-07-22 RX ORDER — METRONIDAZOLE 500 MG/1
500 TABLET ORAL 3 TIMES DAILY
Qty: 21 TABLET | Refills: 0 | Status: SHIPPED | OUTPATIENT
Start: 2017-07-22 | End: 2017-07-29

## 2017-07-22 RX ORDER — DOXYCYCLINE 100 MG/1
100 CAPSULE ORAL 2 TIMES DAILY
Qty: 14 CAPSULE | Refills: 0 | Status: SHIPPED | OUTPATIENT
Start: 2017-07-22 | End: 2017-07-29

## 2017-07-22 RX ORDER — HYDROCODONE BITARTRATE AND ACETAMINOPHEN 5; 325 MG/1; MG/1
1-2 TABLET ORAL EVERY 6 HOURS PRN
Qty: 15 TABLET | Refills: 0 | Status: SHIPPED | OUTPATIENT
Start: 2017-07-22 | End: 2018-03-01

## 2017-07-22 RX ORDER — ERTAPENEM 1 G/1
1 INJECTION, POWDER, LYOPHILIZED, FOR SOLUTION INTRAMUSCULAR; INTRAVENOUS ONCE
Status: COMPLETED | OUTPATIENT
Start: 2017-07-22 | End: 2017-07-22

## 2017-07-22 RX ADMIN — ERTAPENEM SODIUM 1 G: 1 INJECTION, POWDER, LYOPHILIZED, FOR SOLUTION INTRAMUSCULAR; INTRAVENOUS at 14:42

## 2017-07-22 RX ADMIN — HYDROCODONE BITARTRATE AND ACETAMINOPHEN 2 TABLET: 5; 325 TABLET ORAL at 15:30

## 2017-07-22 RX ADMIN — IBUPROFEN 600 MG: 600 TABLET ORAL at 15:30

## 2017-07-22 NOTE — ED AVS SNAPSHOT
[facility] Emergency Department    [address] [place] [address]    Phone:  [phone]    Fax:  [phone]                                       Maranda Figueroa   MRN: [id number]    Department:  [facility] Emergency Department   Date of Visit:  7/22/2017           Patient Information     Date Of Birth          1970        Your diagnoses for this visit were:     Cat bite of right hand including fingers with infection, initial encounter        You were seen by Malgorzata Jones MD.      Follow-up Information     Follow up with Diana Matias MD.    Specialty:  Family Practice    Contact information:     [facility]  [address] [place] [address] [phone]          Discharge Instructions       Continue to rest and elevate the hand.    Ibuprofen for pain and inflammation.    Vicodin for severe pain.    Antibiotics, doxycycline and metronidazole, as directed. I recommended eating yogurt or taking probiotics while on these medications.    If you have any worsening or concerns return to the ED.    Follow up in clinic if not improving to this week.    I hope you are MUCH better quickly!!    Discharge References/Attachments     CAT BITE (ENGLISH)      24 Hour Appointment Hotline       To make an appointment at any [facility], call [phone] ([phone]). If you don't have a family doctor or clinic, we will help you find one. [facility] clinics are conveniently located to serve the needs of you and your family.             Review of your medicines      START taking        Dose / Directions Last dose taken    doxycycline 100 MG capsule   Commonly known as:  VIBRAMYCIN   Dose:  100 mg   Quantity:  14 capsule        Take 1 capsule (100 mg) by mouth 2 times daily for 7 days   Refills:  0        HYDROcodone-acetaminophen 5-325 MG per tablet   Commonly known as:  NORCO   Dose:  1-2 tablet   Quantity:  15 tablet        Take 1-2 tablets by mouth  every 6 hours as needed for moderate to severe pain   Refills:  0        ibuprofen 600 MG tablet   Commonly known as:  ADVIL/MOTRIN   Dose:  600 mg   Quantity:  40 tablet        Take 1 tablet (600 mg) by mouth every 6 hours as needed for moderate pain   Refills:  0        metroNIDAZOLE 500 MG tablet   Commonly known as:  FLAGYL   Dose:  500 mg   Quantity:  21 tablet        Take 1 tablet (500 mg) by mouth 3 times daily for 7 days   Refills:  0          Our records show that you are taking the medicines listed below. If these are incorrect, please call your family doctor or clinic.        Dose / Directions Last dose taken    buPROPion 150 MG 12 hr tablet   Commonly known as:  WELLBUTRIN SR   Quantity:  60 tablet        Take 1 tablet once daily and increase to 1 tablet twice daily after 4 to 7 days   Refills:  5        cyclobenzaprine 5 MG tablet   Commonly known as:  FLEXERIL   Dose:  5 mg   Quantity:  30 tablet        Take 1 tablet (5 mg) by mouth nightly as needed for muscle spasms (neck tension)   Refills:  1        diltiazem 180 MG 24 hr capsule   Commonly known as:  CARDIZEM CD   Dose:  180 mg   Quantity:  30 capsule        Take 1 capsule (180 mg) by mouth daily   Refills:  5        omeprazole 20 MG CR capsule   Commonly known as:  priLOSEC   Dose:  20 mg   Quantity:  90 capsule        Take 1 capsule (20 mg) by mouth daily   Refills:  1        rizatriptan 5 MG tablet   Commonly known as:  MAXALT   Dose:  5-10 mg   Quantity:  18 tablet        Take 1-2 tablets (5-10 mg) by mouth at onset of headache for migraine May repeat in 2 hours. Max 6 tablets/24 hours.   Refills:  3        traMADol 50 MG tablet   Commonly known as:  ULTRAM   Dose:   mg   Quantity:  20 tablet        Take 1-2 tablets ( mg) by mouth every 6 hours as needed for breakthrough pain maximum 4 tablet(s) per day   Refills:  0                Prescriptions were sent or printed at these locations (4 Prescriptions)                   Other  "Prescriptions                Printed at Department/Unit printer (4 of 4)         ibuprofen (ADVIL/MOTRIN) 600 MG tablet               HYDROcodone-acetaminophen (NORCO) 5-325 MG per tablet               doxycycline (VIBRAMYCIN) 100 MG capsule               metroNIDAZOLE (FLAGYL) 500 MG tablet                Procedures and tests performed during your visit     Basic metabolic panel    CBC with platelets differential    CRP inflammation    Erythrocyte sedimentation rate auto      Orders Needing Specimen Collection     None      Pending Results     No orders found from 7/20/2017 to 7/23/2017.            Pending Culture Results     No orders found from 7/20/2017 to 7/23/2017.            Pending Results Instructions     If you had any lab results that were not finalized at the time of your Discharge, you can call the ED Lab Result RN at [phone]. You will be contacted by this team for any positive Lab results or changes in treatment. The nurses are available 7 days a week from 10A to 6:30P.  You can leave a message 24 hours per day and they will return your call.        Thank you for choosing [facility]       Thank you for choosing [facility] for your care. Our goal is always to provide you with excellent care. Hearing back from our patients is one way we can continue to improve our services. Please take a few minutes to complete the written survey that you may receive in the mail after you visit with us. Thank you!        [employer] Information     [employer] lets you send messages to your doctor, view your test results, renew your prescriptions, schedule appointments and more. To sign up, go to [email or web address] . Click on \"Log in\" on the left side of the screen, which will take you to the Welcome page. Then click on \"Sign up Now\" on the right side of the page.     You will be asked to enter the access code listed below, as well as some personal information. Please follow the directions to create your username and " password.     Your access code is: [email or web address]  Expires: 10/4/2017  9:26 AM     Your access code will  in 90 days. If you need help or a new code, please call your [facility] clinic or [phone].        Care EveryWhere ID     This is your Care EveryWhere ID. This could be used by other organizations to access your [facility] medical records  [id number]        Equal Access to Services     MARIBELL BRANHAM : Hadii edvin brown hadasho Soomaali, waaxda luqadaha, qaybta kaalmada adeegyada, henrik frye . So [facility] [phone].    ATENCIÓN: Si habla español, tiene a moreira disposición servicios gratuitos de asistencia lingüística. Llame al [phone].    We comply with applicable federal civil rights laws and [place] laws. We do not discriminate on the basis of race, color, national origin, age, disability sex, sexual orientation or gender identity.            After Visit Summary       This is your record. Keep this with you and show to your community pharmacist(s) and doctor(s) at your next visit.

## 2017-07-22 NOTE — ED AVS SNAPSHOT
[facility] Emergency Department    [address] [place] [address]    Phone:  [phone]    Fax:  [phone]                                       Maranda Figueroa   MRN: [id number]    Department:  [facility] Emergency Department   Date of Visit:  7/22/2017           After Visit Summary Signature Page     I have received my discharge instructions, and my questions have been answered. I have discussed any challenges I see with this plan with the nurse or doctor.    ..........................................................................................................................................  Patient/Patient Representative Signature      ..........................................................................................................................................  Patient Representative Print Name and Relationship to Patient    ..................................................               ................................................  Date                                            Time    ..........................................................................................................................................  Reviewed by Signature/Title    ...................................................              ..............................................  Date                                                            Time

## 2017-07-22 NOTE — TELEPHONE ENCOUNTER
"  Reason for Disposition    [1] Puncture wound (hole through the skin) from claws or teeth AND [2] cat    Additional Information    Negative: [1] Major bleeding (e.g., actively dripping or spurting) AND [2] can't be stopped    Negative: Sounds like a life-threatening emergency to the triager    Negative: Snake bite    Negative: Bite, wound, or sting from fish    Negative: [1] Any break in skin (e.g., cut, puncture or scratch) AND[2] wild animal at risk for RABIES (e.g., bat, raccoon, gregory, skunk, coyote, other carnivores)    Negative: [1] Any break in skin (e.g., cut, puncture or scratch) AND[2] dog, cat, or ferret at risk for RABIES (e.g., sick, stray, unprovoked bite, developing country)    Negative: [1] Any break in skin (e.g., cut, puncture or scratch) AND[2] monkey    Negative: [1] Cut (length > 1/8 inch or 3 mm) or skin tear AND[2] any animal    Negative: [1] Bleeding AND [2] won't stop after 10 minutes of direct pressure (using correct technique)    Negative: Description of bite sounds severe to the triager    Negative: [1] Non-bite body fluid contact (e.g., saliva, brain) AND [2] onto open cut/wound or mucous membranes AND[3] animal at high-risk for RABIES (e.g., bat, raccoon, gregory, skunk, coyote, other carnivores)    Protocols used: ANIMAL BITE-ADULT-  \"My cat bit me yesterday and I have three small puncture wounds. Today my hand is painful and swollen.\"  Zeenat Anthony RN  [facility]    "

## 2017-07-22 NOTE — ED PROVIDER NOTES
History     Chief Complaint   Patient presents with     Cat Bite     The history is provided by the patient.     This is a 47-year-old female presenting with cat bite. Patient was trying to separate her cat from her son's dog last night when the cat bit her on her right index finger. She did wash the wound but has noted that is very calm significantly more swollen and tender. She has not taken any medications for the pain. She has been trying to ice and elevate the hand. Pain increases with any movement of the index finger.  Patient believes that the cat has been immunized. She is unsure of her tetanus status. She is right-handed. She denies any other significant medical issues.    I have reviewed the Medications, Allergies, Past Medical and Surgical History, and Social History in the Epic system.    Allergies:   Allergies   Allergen Reactions     Penicillins      Causes nausea with emesis         No current facility-administered medications on file prior to encounter.   Current Outpatient Prescriptions on File Prior to Encounter:  cyclobenzaprine (FLEXERIL) 5 MG tablet Take 1 tablet (5 mg) by mouth nightly as needed for muscle spasms (neck tension)   rizatriptan (MAXALT) 5 MG tablet Take 1-2 tablets (5-10 mg) by mouth at onset of headache for migraine May repeat in 2 hours. Max 6 tablets/24 hours.   traMADol (ULTRAM) 50 MG tablet Take 1-2 tablets ( mg) by mouth every 6 hours as needed for breakthrough pain maximum 4 tablet(s) per day   omeprazole (PRILOSEC) 20 MG CR capsule Take 1 capsule (20 mg) by mouth daily   diltiazem (CARDIZEM CD) 180 MG 24 hr CD capsule Take 1 capsule (180 mg) by mouth daily   buPROPion (WELLBUTRIN SR) 150 MG 12 hr tablet Take 1 tablet once daily and increase to 1 tablet twice daily after 4 to 7 days       Patient Active Problem List   Diagnosis     Anxiety state     CARDIOVASCULAR SCREENING; LDL GOAL LESS THAN 160     Major depression in complete remission (H)     Hearing impairment  "    Insomnia     HTN, goal below 140/90     Vitamin D deficiency     Gastroesophageal reflux disease without esophagitis     Panic attack       Past Surgical History:   Procedure Laterality Date     ESOPHAGOSCOPY, GASTROSCOPY, DUODENOSCOPY (EGD), COMBINED N/A 10/5/2015    Procedure: COMBINED ESOPHAGOSCOPY, GASTROSCOPY, DUODENOSCOPY (EGD), BIOPSY SINGLE OR MULTIPLE;  Surgeon: Calin Kenyon MD;  Location: [facility]     OPEN REDUCTION INTERNAL FIXATION RODDING INTRAMEDULLARY TIBIA  11/6/2011    Procedure:OPEN REDUCTION INTERNAL FIXATION RODDING INTRAMEDULLARY TIBIA; Open Reduction internal fixation rodding right tibia; Surgeon:JUNO TUTTLE; Location:[facility]     TYMPANOPLASTY, RT/LT      Tympanoplasty/LT       Social History   Substance Use Topics     Smoking status: Former Smoker     Quit date: 6/1/1994     Smokeless tobacco: Not on file     Alcohol use 1.0 oz/week      Comment: Occ.       Most Recent Immunizations   Administered Date(s) Administered     TDAP Vaccine (Adacel) 11/19/2013       BMI: Estimated body mass index is 28.16 kg/(m^2) as calculated from the following:    Height as of 7/6/17: 1.58 m (5' 2.21\").    Weight as of this encounter: 70.3 kg (155 lb).      Review of Systems   Musculoskeletal: Positive for joint swelling (right hand swelling from cat bite, painful to move fingers).   Skin: Positive for color change (erythema to right hand) and wound (3 puncture wounds to 2nd digit to right hand).   All other systems reviewed and are negative.      Physical Exam   BP: (!) 166/119  Pulse: 103  Temp: 98.5  F (36.9  C)  Resp: 18  Weight: 70.3 kg (155 lb)  SpO2: 98 %  Physical Exam   Constitutional: She appears well-developed and well-nourished.   Mildly anxious female sitting in the bed   HENT:   Head: Normocephalic.   Nose: Nose normal.   Eyes: Conjunctivae and EOM are normal.   Neck: Normal range of motion.   Cardiovascular: Normal rate, regular rhythm and intact distal pulses.    Pulmonary/Chest: " Effort normal.   Musculoskeletal:        Hands:  Skin: She is not diaphoretic.   Vitals reviewed.      ED Course (with Medical Decision Making)    Pt seen and examined by me.  RN and EPIC notes reviewed.      Patient with cat bite to the right index finger - obviously infected. Most of the erythema and pain is on the dorsum of the hand, extensor area. She has a small amount of pain and swelling on the palmar aspect of the right 2nd finger.     Current plan is to place an IV and give her a dose of IV antibiotics. She does have a penicillin allergy. Will also give her ibuprofen and Vicodin. I'm hoping that with early antibiotics, she can use oral antibiotics and avoid any need for further intervention.     Patient will be discharged with doxycycline and Flagyl along with some Vicodin and ibuprofen. Continue to elevate. If she has any worsening or changes she needs to return promptly to the ED.      Procedures    Medications   ertapenem (INVanz) 1 g vial to attach to  mL bag (0 g Intravenous Stopped 7/22/17 1527)   HYDROcodone-acetaminophen (NORCO) 5-325 MG per tablet 2 tablet (2 tablets Oral Given 7/22/17 1530)   ibuprofen (ADVIL/MOTRIN) tablet 600 mg (600 mg Oral Given 7/22/17 1530)       Assessments & Plan      I have reviewed the findings, diagnosis, plan and need for follow up with the patient.    Discharge Medication List as of 7/22/2017  3:27 PM      START taking these medications    Details   ibuprofen (ADVIL/MOTRIN) 600 MG tablet Take 1 tablet (600 mg) by mouth every 6 hours as needed for moderate pain, Disp-40 tablet, R-0, Local Print      HYDROcodone-acetaminophen (NORCO) 5-325 MG per tablet Take 1-2 tablets by mouth every 6 hours as needed for moderate to severe pain, Disp-15 tablet, R-0, Local Print      doxycycline (VIBRAMYCIN) 100 MG capsule Take 1 capsule (100 mg) by mouth 2 times daily for 7 days, Disp-14 capsule, R-0, Local Print      metroNIDAZOLE (FLAGYL) 500 MG tablet Take 1 tablet (500 mg)  by mouth 3 times daily for 7 days, Disp-21 tablet, R-0, Local PrintEat yogurt or cottage cheese daily to prevent diarrhea that can be caused by taking this medication.             Final diagnoses:   Cat bite of right hand including fingers with infection, initial encounter     Disposition: Patient discharged home in stable condition to care of her . Plan as above. Return for concerns.    This document serves as a record of services personally performed by Malgorzata Jones MD. It was created on their behalf by Ehsan Guzmán, a trained medical scribe. The creation of this record is based on the provider's personal observations and the statements of the patient. This document has been checked and approved by the attending provider.    Note: Chart documentation done in part with Dragon Voice Recognition software. Although reviewed after completion, some word and grammatical errors may remain.    7/22/2017   [facility] EMERGENCY DEPARTMENT     Malgorzata Jones MD  07/22/17 4536       Malgorzata Jones MD  07/23/17 0758

## 2017-07-22 NOTE — DISCHARGE INSTRUCTIONS
Continue to rest and elevate the hand.    Ibuprofen for pain and inflammation.    Vicodin for severe pain.    Antibiotics, doxycycline and metronidazole, as directed. I recommended eating yogurt or taking probiotics while on these medications.    If you have any worsening or concerns return to the ED.    Follow up in clinic if not improving to this week.    I hope you are MUCH better quickly!!

## 2017-07-23 ASSESSMENT — ENCOUNTER SYMPTOMS
JOINT SWELLING: 1
WOUND: 1
COLOR CHANGE: 1

## 2017-07-27 ENCOUNTER — TELEPHONE (OUTPATIENT)
Dept: FAMILY MEDICINE | Facility: OTHER | Age: 47
End: 2017-07-27

## 2017-07-27 NOTE — LETTER
[facility]  [address] [place] [address]  Phone: [phone]  July 27, 2017      Maranda Figueroa  [address] [place] [facility] [address]      Dear Maranda,    We care about your health and have reviewed your health plan including your medical conditions, medications, and lab results.  Based on this review, it is recommended that you follow up regarding the following health topic(s):  -High Blood Pressure  -Cervical Cancer Screening    We recommend you take the following action(s):  -schedule a FREE FLOAT MA-ONLY BLOOD PRESSURE APPOINTMENT within the next 1-4 weeks.  -schedule a PAP SMEAR EXAM which is due.  Please disregard this reminder if you have had this exam elsewhere within the last year.  It would be helpful for us to have a copy of your recent pap smear report to update your records.     Please call us at the [facility] - [phone] (or use [employer]) to address the above recommendations.     Thank you for trusting [facility] and we appreciate the opportunity to serve you.  We look forward to supporting your healthcare needs in the future.    Healthy Regards,    Your Health Care Team  [facility]

## 2017-07-27 NOTE — TELEPHONE ENCOUNTER
Summary:    Patient is due/failing the following:   BP CHECK and PAP    Action needed:   Patient needs office visit for PAP and BP check .    Type of outreach:    Sent letter.    Questions for provider review:    None                                                                                                                                    Lidia Jhaveri       Chart routed to Care Team .      Panel Management Review      Patient has the following on her problem list:     Depression / Dysthymia review  PHQ-9 SCORE 3/28/2016 10/17/2016 7/7/2017   Total Score - - -   Total Score 4 13 9      Patient is due for:  None    Hypertension   Last three blood pressure readings:  BP Readings from Last 3 Encounters:   07/22/17 (!) 166/119   07/13/17 (!) 157/98   07/13/17 (!) 146/100     Blood pressure: FAILED    HTN Guidelines:  Age 18-59 BP range:  Less than 140/90  Age 60-85 with Diabetes:  Less than 140/90  Age 60-85 without Diabetes:  less than 150/90          Composite cancer screening  Chart review shows that this patient is due/due soon for the following Pap Smear

## 2017-09-03 ENCOUNTER — HEALTH MAINTENANCE LETTER (OUTPATIENT)
Age: 47
End: 2017-09-03

## 2017-09-07 ENCOUNTER — TELEPHONE (OUTPATIENT)
Dept: FAMILY MEDICINE | Facility: OTHER | Age: 47
End: 2017-09-07

## 2017-10-13 ENCOUNTER — TELEPHONE (OUTPATIENT)
Dept: FAMILY MEDICINE | Facility: OTHER | Age: 47
End: 2017-10-13

## 2017-10-13 NOTE — TELEPHONE ENCOUNTER
"Maranda Figueroa is a 47 year old female who calls with back pain radiating to abdominal pain.    NURSING ASSESSMENT:  Description:  Pt states she started having back pain last Friday.  The pain is now radiating into stomach.  Pt states it is her whole \"spare tire\" when asked where in her stomach is her pain.  Pt had a normal bowel movement this morning.  She is supposed to get her period today so she is unsure if that is why she is having these symptoms.  Onset/duration:  Last Friday, 10/6.  Precip. factors:  none  Associated symptoms:  Back pain, abdominal pain (her whole stomach).  Denies urinary symptoms, vomiting, diarrhea, fever, leg symptoms, pregnancy.  Improves/worsens symptoms:  Pt has been taking IBU which helps a little.  She has also been using a hot pack.  Pain scale (0-10)   7/10    Allergies:   Allergies   Allergen Reactions     Penicillins      Causes nausea with emesis       RECOMMENDED DISPOSITION:  See in 24 hours - due to persistent pain.  Scheduled for Monday due to no appointments available today.  Advised pt to go to the ER over the weekend if pain increases.  Will comply with recommendation: Yes  If further questions/concerns or if symptoms do not improve, worsen or new symptoms develop, call your PCP or [facility] as soon as possible.      Guideline used: back pain, abdominal pain  Telephone Triage Protocols for Nurses, Fifth Edition, Rolanda Lockhart RN    "

## 2017-10-14 ENCOUNTER — HOSPITAL ENCOUNTER (EMERGENCY)
Facility: CLINIC | Age: 47
Discharge: HOME | End: 2017-10-14
Attending: EMERGENCY MEDICINE | Admitting: EMERGENCY MEDICINE
Payer: COMMERCIAL

## 2017-10-14 ENCOUNTER — APPOINTMENT (OUTPATIENT)
Dept: CT IMAGING | Facility: CLINIC | Age: 47
End: 2017-10-14
Attending: EMERGENCY MEDICINE
Payer: COMMERCIAL

## 2017-10-14 VITALS
HEIGHT: 62 IN | BODY MASS INDEX: 28.52 KG/M2 | DIASTOLIC BLOOD PRESSURE: 98 MMHG | RESPIRATION RATE: 16 BRPM | WEIGHT: 155 LBS | SYSTOLIC BLOOD PRESSURE: 147 MMHG | TEMPERATURE: 99.7 F | OXYGEN SATURATION: 98 % | HEART RATE: 103 BPM

## 2017-10-14 DIAGNOSIS — R10.31 ABDOMINAL PAIN, RIGHT LOWER QUADRANT: ICD-10-CM

## 2017-10-14 DIAGNOSIS — I10 ESSENTIAL HYPERTENSION WITH GOAL BLOOD PRESSURE LESS THAN 140/90: ICD-10-CM

## 2017-10-14 DIAGNOSIS — Z87.891 PERSONAL HISTORY OF TOBACCO USE, PRESENTING HAZARDS TO HEALTH: ICD-10-CM

## 2017-10-14 LAB
ALBUMIN SERPL-MCNC: 3.4 G/DL (ref 3.4–5)
ALBUMIN UR-MCNC: NEGATIVE MG/DL
ALP SERPL-CCNC: 105 U/L (ref 40–150)
ALT SERPL W P-5'-P-CCNC: 18 U/L (ref 0–50)
ANION GAP SERPL CALCULATED.3IONS-SCNC: 8 MMOL/L (ref 3–14)
APPEARANCE UR: CLEAR
AST SERPL W P-5'-P-CCNC: 16 U/L (ref 0–45)
BACTERIA #/AREA URNS HPF: ABNORMAL /HPF
BASOPHILS # BLD AUTO: 0 10E9/L (ref 0–0.2)
BASOPHILS NFR BLD AUTO: 0.4 %
BILIRUB SERPL-MCNC: 0.3 MG/DL (ref 0.2–1.3)
BILIRUB UR QL STRIP: NEGATIVE
BUN SERPL-MCNC: 10 MG/DL (ref 7–30)
CALCIUM SERPL-MCNC: 8.5 MG/DL (ref 8.5–10.1)
CHLORIDE SERPL-SCNC: 110 MMOL/L (ref 94–109)
CO2 SERPL-SCNC: 23 MMOL/L (ref 20–32)
COLOR UR AUTO: ABNORMAL
CREAT SERPL-MCNC: 0.64 MG/DL (ref 0.52–1.04)
CRP SERPL-MCNC: 60.3 MG/L (ref 0–8)
DIFFERENTIAL METHOD BLD: NORMAL
EOSINOPHIL # BLD AUTO: 0.1 10E9/L (ref 0–0.7)
EOSINOPHIL NFR BLD AUTO: 0.6 %
ERYTHROCYTE [DISTWIDTH] IN BLOOD BY AUTOMATED COUNT: 12.3 % (ref 10–15)
GFR SERPL CREATININE-BSD FRML MDRD: >90 ML/MIN/1.7M2
GLUCOSE SERPL-MCNC: 129 MG/DL (ref 70–99)
GLUCOSE UR STRIP-MCNC: NEGATIVE MG/DL
HCG UR QL: NEGATIVE
HCT VFR BLD AUTO: 39 % (ref 35–47)
HGB BLD-MCNC: 12.9 G/DL (ref 11.7–15.7)
HGB UR QL STRIP: ABNORMAL
IMM GRANULOCYTES # BLD: 0 10E9/L (ref 0–0.4)
IMM GRANULOCYTES NFR BLD: 0.1 %
KETONES UR STRIP-MCNC: NEGATIVE MG/DL
LACTATE BLD-SCNC: 1.4 MMOL/L (ref 0.7–2)
LEUKOCYTE ESTERASE UR QL STRIP: NEGATIVE
LIPASE SERPL-CCNC: 155 U/L (ref 73–393)
LYMPHOCYTES # BLD AUTO: 1.1 10E9/L (ref 0.8–5.3)
LYMPHOCYTES NFR BLD AUTO: 13.5 %
MCH RBC QN AUTO: 31.9 PG (ref 26.5–33)
MCHC RBC AUTO-ENTMCNC: 33.1 G/DL (ref 31.5–36.5)
MCV RBC AUTO: 96 FL (ref 78–100)
MONOCYTES # BLD AUTO: 0.8 10E9/L (ref 0–1.3)
MONOCYTES NFR BLD AUTO: 10.7 %
MUCOUS THREADS #/AREA URNS LPF: PRESENT /LPF
NEUTROPHILS # BLD AUTO: 5.8 10E9/L (ref 1.6–8.3)
NEUTROPHILS NFR BLD AUTO: 74.7 %
NITRATE UR QL: NEGATIVE
PH UR STRIP: 6 PH (ref 5–7)
PLATELET # BLD AUTO: 217 10E9/L (ref 150–450)
POTASSIUM SERPL-SCNC: 3.8 MMOL/L (ref 3.4–5.3)
PROT SERPL-MCNC: 7.2 G/DL (ref 6.8–8.8)
RBC # BLD AUTO: 4.05 10E12/L (ref 3.8–5.2)
RBC #/AREA URNS AUTO: 4 /HPF (ref 0–2)
SODIUM SERPL-SCNC: 141 MMOL/L (ref 133–144)
SOURCE: ABNORMAL
SP GR UR STRIP: 1.01 (ref 1–1.03)
SQUAMOUS #/AREA URNS AUTO: 1 /HPF (ref 0–1)
UROBILINOGEN UR STRIP-MCNC: 0 MG/DL (ref 0–2)
WBC # BLD AUTO: 7.8 10E9/L (ref 4–11)
WBC #/AREA URNS AUTO: 3 /HPF (ref 0–2)

## 2017-10-14 PROCEDURE — 25000128 H RX IP 250 OP 636: Performed by: EMERGENCY MEDICINE

## 2017-10-14 PROCEDURE — 81025 URINE PREGNANCY TEST: CPT | Performed by: EMERGENCY MEDICINE

## 2017-10-14 PROCEDURE — 80053 COMPREHEN METABOLIC PANEL: CPT | Performed by: EMERGENCY MEDICINE

## 2017-10-14 PROCEDURE — 83605 ASSAY OF LACTIC ACID: CPT | Performed by: EMERGENCY MEDICINE

## 2017-10-14 PROCEDURE — 81001 URINALYSIS AUTO W/SCOPE: CPT | Performed by: EMERGENCY MEDICINE

## 2017-10-14 PROCEDURE — 25000125 ZZHC RX 250: Performed by: EMERGENCY MEDICINE

## 2017-10-14 PROCEDURE — 83690 ASSAY OF LIPASE: CPT | Performed by: EMERGENCY MEDICINE

## 2017-10-14 PROCEDURE — 96375 TX/PRO/DX INJ NEW DRUG ADDON: CPT | Performed by: EMERGENCY MEDICINE

## 2017-10-14 PROCEDURE — 99285 EMERGENCY DEPT VISIT HI MDM: CPT | Mod: 25 | Performed by: EMERGENCY MEDICINE

## 2017-10-14 PROCEDURE — 74177 CT ABD & PELVIS W/CONTRAST: CPT

## 2017-10-14 PROCEDURE — 96374 THER/PROPH/DIAG INJ IV PUSH: CPT | Mod: 59 | Performed by: EMERGENCY MEDICINE

## 2017-10-14 PROCEDURE — 85025 COMPLETE CBC W/AUTO DIFF WBC: CPT | Performed by: EMERGENCY MEDICINE

## 2017-10-14 PROCEDURE — 99285 EMERGENCY DEPT VISIT HI MDM: CPT | Mod: Z6 | Performed by: EMERGENCY MEDICINE

## 2017-10-14 PROCEDURE — 86140 C-REACTIVE PROTEIN: CPT | Performed by: EMERGENCY MEDICINE

## 2017-10-14 RX ORDER — TRAMADOL HYDROCHLORIDE 50 MG/1
50 TABLET ORAL EVERY 6 HOURS PRN
Qty: 20 TABLET | Refills: 0 | Status: SHIPPED | OUTPATIENT
Start: 2017-10-14 | End: 2021-08-20

## 2017-10-14 RX ORDER — ONDANSETRON 2 MG/ML
4 INJECTION INTRAMUSCULAR; INTRAVENOUS EVERY 30 MIN PRN
Status: DISCONTINUED | OUTPATIENT
Start: 2017-10-14 | End: 2017-10-14

## 2017-10-14 RX ORDER — IOPAMIDOL 755 MG/ML
100 INJECTION, SOLUTION INTRAVASCULAR ONCE
Status: COMPLETED | OUTPATIENT
Start: 2017-10-14 | End: 2017-10-14

## 2017-10-14 RX ORDER — DILTIAZEM HYDROCHLORIDE 180 MG/1
180 CAPSULE, COATED, EXTENDED RELEASE ORAL DAILY
Qty: 30 CAPSULE | Refills: 5 | Status: SHIPPED | OUTPATIENT
Start: 2017-10-14 | End: 2018-03-01

## 2017-10-14 RX ORDER — HYDROMORPHONE HYDROCHLORIDE 1 MG/ML
0.5 INJECTION, SOLUTION INTRAMUSCULAR; INTRAVENOUS; SUBCUTANEOUS
Status: DISCONTINUED | OUTPATIENT
Start: 2017-10-14 | End: 2017-10-14

## 2017-10-14 RX ADMIN — HYDROMORPHONE HYDROCHLORIDE 0.5 MG: 1 INJECTION, SOLUTION INTRAMUSCULAR; INTRAVENOUS; SUBCUTANEOUS at 05:19

## 2017-10-14 RX ADMIN — IOPAMIDOL 76 ML: 755 INJECTION, SOLUTION INTRAVENOUS at 06:12

## 2017-10-14 RX ADMIN — SODIUM CHLORIDE 80 ML: 9 INJECTION, SOLUTION INTRAVENOUS at 06:15

## 2017-10-14 RX ADMIN — ONDANSETRON 4 MG: 2 SOLUTION INTRAMUSCULAR; INTRAVENOUS at 05:16

## 2017-10-14 ASSESSMENT — ENCOUNTER SYMPTOMS
ABDOMINAL DISTENTION: 1
FATIGUE: 1
APPETITE CHANGE: 1
CONSTIPATION: 1
FEVER: 1
ABDOMINAL PAIN: 1
VOMITING: 0
BACK PAIN: 1
NAUSEA: 1
CHILLS: 1

## 2017-10-14 NOTE — ED AVS SNAPSHOT
[facility] Emergency Department    [address] [place] [address]    Phone:  [phone]    Fax:  [phone]                                       Maranda Figueroa   MRN: [id number]    Department:  [facility] Emergency Department   Date of Visit:  10/14/2017           Patient Information     Date Of Birth          1970        Your diagnoses for this visit were:     Abdominal pain, right lower quadrant     Essential hypertension with goal blood pressure less than 140/90        You were seen by Raul Waddell MD.      Follow-up Information     Follow up with Clinic, [facility].    Why:  As needed    Contact information:    [address]  [facility] [id number] [phone]        Discharge References/Attachments     MUSCLE STRAIN, ABDOMEN (ENGLISH)    ABDOMINAL PAIN, ADULT (ENGLISH)      Future Appointments        Provider Department Dept Phone Center    10/16/2017 11:20 AM Scott Christie PA-C [facility] [phone] [facility]      24 Hour Appointment Hotline       To make an appointment at any [facility], call [phone] ([phone]). If you don't have a family doctor or clinic, we will help you find one. [facility] are conveniently located to serve the needs of you and your family.             Review of your medicines      CONTINUE these medicines which may have CHANGED, or have new prescriptions. If we are uncertain of the size of tablets/capsules you have at home, strength may be listed as something that might have changed.        Dose / Directions Last dose taken    * traMADol 50 MG tablet   Commonly known as:  ULTRAM   Dose:   mg   What changed:  Another medication with the same name was added. Make sure you understand how and when to take each.   Quantity:  20 tablet        Take 1-2 tablets ( mg) by mouth every 6 hours as needed for breakthrough pain maximum 4 tablet(s) per day   Refills:  0        *  traMADol 50 MG tablet   Commonly known as:  ULTRAM   Dose:  50 mg   What changed:  You were already taking a medication with the same name, and this prescription was added. Make sure you understand how and when to take each.   Quantity:  20 tablet        Take 1 tablet (50 mg) by mouth every 6 hours as needed for pain maximum 6 tablet(s) per day   Refills:  0        * Notice:  This list has 2 medication(s) that are the same as other medications prescribed for you. Read the directions carefully, and ask your doctor or other care provider to review them with you.      Our records show that you are taking the medicines listed below. If these are incorrect, please call your family doctor or clinic.        Dose / Directions Last dose taken    buPROPion 150 MG 12 hr tablet   Commonly known as:  WELLBUTRIN SR   Quantity:  60 tablet        Take 1 tablet once daily and increase to 1 tablet twice daily after 4 to 7 days   Refills:  5        cyclobenzaprine 5 MG tablet   Commonly known as:  FLEXERIL   Dose:  5 mg   Quantity:  30 tablet        Take 1 tablet (5 mg) by mouth nightly as needed for muscle spasms (neck tension)   Refills:  1        diltiazem 180 MG 24 hr capsule   Commonly known as:  CARDIZEM CD   Dose:  180 mg   Quantity:  30 capsule        Take 1 capsule (180 mg) by mouth daily   Refills:  5        HYDROcodone-acetaminophen 5-325 MG per tablet   Commonly known as:  NORCO   Dose:  1-2 tablet   Quantity:  15 tablet        Take 1-2 tablets by mouth every 6 hours as needed for moderate to severe pain   Refills:  0        ibuprofen 600 MG tablet   Commonly known as:  ADVIL/MOTRIN   Dose:  600 mg   Quantity:  40 tablet        Take 1 tablet (600 mg) by mouth every 6 hours as needed for moderate pain   Refills:  0        omeprazole 20 MG CR capsule   Commonly known as:  priLOSEC   Dose:  20 mg   Quantity:  90 capsule        Take 1 capsule (20 mg) by mouth daily   Refills:  1        rizatriptan 5 MG tablet   Commonly known  as:  MAXALT   Dose:  5-10 mg   Quantity:  18 tablet        Take 1-2 tablets (5-10 mg) by mouth at onset of headache for migraine May repeat in 2 hours. Max 6 tablets/24 hours.   Refills:  3                Prescriptions were sent or printed at these locations (2 Prescriptions)                   [facility]   [address] [address]    Telephone:  [phone]   Fax:  [phone]   Hours:                  These medications are not ready yet because we are checking if your insurance will help you pay for them. Call your pharmacy to confirm that your medication is ready for pickup. It may take up to 24 hours for them to receive the prescription. If the prescription is not ready within 3 business days, please contact your clinic or your provider (1 of 1)         diltiazem (CARDIZEM CD) 180 MG 24 hr capsule                     Other Prescriptions                Printed at Department/Unit printer (1 of 1)         traMADol (ULTRAM) 50 MG tablet                Procedures and tests performed during your visit     CBC with platelets differential    CRP inflammation    CT Abdomen Pelvis w Contrast    Comprehensive metabolic panel    Give 20 ounces of water 15 minutes before CT of abdomen    HCG qualitative urine    Lactic acid whole blood    Lipase    Peripheral IV: Standard    UA with Microscopic      Orders Needing Specimen Collection     None      Pending Results     No orders found from 10/12/2017 to 10/15/2017.            Pending Culture Results     No orders found from 10/12/2017 to 10/15/2017.            Pending Results Instructions     If you had any lab results that were not finalized at the time of your Discharge, you can call the ED Lab Result RN at [phone]. You will be contacted by this team for any positive Lab results or changes in treatment. The nurses are available 7 days a week from 10A to 6:30P.  You can leave a message 24 hours per day and they will return  "your call.        Thank you for choosing [facility]       Thank you for choosing [facility] for your care. Our goal is always to provide you with excellent care. Hearing back from our patients is one way we can continue to improve our services. Please take a few minutes to complete the written survey that you may receive in the mail after you visit with us. Thank you!        "[employer] Information     [email or web address] lets you send messages to your doctor, view your test results, renew your prescriptions, schedule appointments and more. To sign up, go to [email or web address] . Click on \"Log in\" on the left side of the screen, which will take you to the Welcome page. Then click on \"Sign up Now\" on the right side of the page.     You will be asked to enter the access code listed below, as well as some personal information. Please follow the directions to create your username and password.     Your access code is: [email or web address]  Expires: 2018  7:29 AM     Your access code will  in 90 days. If you need help or a new code, please call your [place] clinic or [phone].        Care EveryWhere ID     This is your Care EveryWhere ID. This could be used by other organizations to access your [facility] medical records  [id number]        Equal Access to Services     MARIBELL BRANHAM AH: Rupal herrerao Sostanley, waaxda luqadaha, qaybta kaalmada adeegyada, henrik alfredo. So [facility] [phone].    ATENCIÓN: Si habla español, tiene a moreira disposición servicios gratuitos de asistencia lingüística. Llame al [phone].    We comply with applicable federal civil rights laws and [place] laws. We do not discriminate on the basis of race, color, national origin, age, disability, sex, sexual orientation, or gender identity.            After Visit Summary       This is your record. Keep this with you and show to your community pharmacist(s) and doctor(s) at your next visit.                  "

## 2017-10-14 NOTE — ED NOTES
Patient with bilateral lower back pain starting last week, now having significant RLQ abdominal pain.

## 2017-10-14 NOTE — ED PROVIDER NOTES
History     Chief Complaint   Patient presents with     Abdominal Pain     The history is provided by the patient and medical records.     Maranda Figueroa is a 47 year old female who presents to ED for evaluation of right lower quadrant pain that started earlier today.  Patient was recently seen for bilateral low back pain that started last week and has now extended into the right lower quadrant.  She has had a low-grade fever, anorexia, and nausea.  She states that when she eats it makes her abdominal pain worse.  In addition, she has an ongoing problem with constipation.  She denies any urinary urgency, frequency, or burning.    I have reviewed the Medications, Allergies, Past Medical and Surgical History, and Social History in the Epic system.    Allergies:   Allergies   Allergen Reactions     Penicillins      Causes nausea with emesis         No current facility-administered medications on file prior to encounter.   Current Outpatient Prescriptions on File Prior to Encounter:  ibuprofen (ADVIL/MOTRIN) 600 MG tablet Take 1 tablet (600 mg) by mouth every 6 hours as needed for moderate pain   HYDROcodone-acetaminophen (NORCO) 5-325 MG per tablet Take 1-2 tablets by mouth every 6 hours as needed for moderate to severe pain   cyclobenzaprine (FLEXERIL) 5 MG tablet Take 1 tablet (5 mg) by mouth nightly as needed for muscle spasms (neck tension)   rizatriptan (MAXALT) 5 MG tablet Take 1-2 tablets (5-10 mg) by mouth at onset of headache for migraine May repeat in 2 hours. Max 6 tablets/24 hours.   traMADol (ULTRAM) 50 MG tablet Take 1-2 tablets ( mg) by mouth every 6 hours as needed for breakthrough pain maximum 4 tablet(s) per day   omeprazole (PRILOSEC) 20 MG CR capsule Take 1 capsule (20 mg) by mouth daily   buPROPion (WELLBUTRIN SR) 150 MG 12 hr tablet Take 1 tablet once daily and increase to 1 tablet twice daily after 4 to 7 days   [DISCONTINUED] diltiazem (CARDIZEM CD) 180 MG 24 hr CD capsule Take 1 capsule  "(180 mg) by mouth daily       Patient Active Problem List   Diagnosis     Anxiety state     CARDIOVASCULAR SCREENING; LDL GOAL LESS THAN 160     Major depression in complete remission (H)     Hearing impairment     Insomnia     HTN, goal below 140/90     Vitamin D deficiency     Gastroesophageal reflux disease without esophagitis     Panic attack       Past Surgical History:   Procedure Laterality Date     ESOPHAGOSCOPY, GASTROSCOPY, DUODENOSCOPY (EGD), COMBINED N/A 10/5/2015    Procedure: COMBINED ESOPHAGOSCOPY, GASTROSCOPY, DUODENOSCOPY (EGD), BIOPSY SINGLE OR MULTIPLE;  Surgeon: Calin Kenyon MD;  Location: [facility]     OPEN REDUCTION INTERNAL FIXATION RODDING INTRAMEDULLARY TIBIA  11/6/2011    Procedure:OPEN REDUCTION INTERNAL FIXATION RODDING INTRAMEDULLARY TIBIA; Open Reduction internal fixation rodding right tibia; Surgeon:JUNO TUTTLE; Location:[facility]     TYMPANOPLASTY, RT/LT      Tympanoplasty/LT       Social History   Substance Use Topics     Smoking status: Former Smoker     Quit date: 6/1/1994     Smokeless tobacco: Not on file     Alcohol use 1.0 oz/week      Comment: Occ.       Most Recent Immunizations   Administered Date(s) Administered     TDAP Vaccine (Adacel) 11/19/2013       BMI: Estimated body mass index is 28.35 kg/(m^2) as calculated from the following:    Height as of this encounter: 1.575 m (5' 2\").    Weight as of this encounter: 70.3 kg (155 lb).      Review of Systems   Constitutional: Positive for appetite change, chills, fatigue and fever.   Gastrointestinal: Positive for abdominal distention, abdominal pain, constipation and nausea. Negative for vomiting.   Musculoskeletal: Positive for back pain.   All other systems reviewed and are negative.      Physical Exam   BP: (!) 186/112  Pulse: 115  Heart Rate: 115  Temp: 99.7  F (37.6  C)  Resp: 20  Height: 157.5 cm (5' 2\")  Weight: 70.3 kg (155 lb)  SpO2: 97 %       Physical Exam   Constitutional: She is oriented to person, place, " and time. She appears well-developed. No distress.   HENT:   Head: Atraumatic.   Mouth/Throat: Oropharynx is clear and moist.   Eyes: EOM are normal. Pupils are equal, round, and reactive to light.   Neck: Normal range of motion. Neck supple.   Cardiovascular: Normal rate, normal heart sounds and intact distal pulses.    Pulmonary/Chest: Effort normal and breath sounds normal.   Abdominal: Soft. Bowel sounds are increased. There is tenderness (Right lower quadrant.  Pain worsens with both legs raised signifying possibly abdominal rectus muscle tenderness at the insertion of the inguinal fold.). There is no rebound and no guarding.   Musculoskeletal: Normal range of motion.   Neurological: She is alert and oriented to person, place, and time.   Skin: Skin is warm.   Nursing note and vitals reviewed.      ED Course     ED Course     Procedures          Results for orders placed or performed during the hospital encounter of 10/14/17   CT Abdomen Pelvis w Contrast    Narrative    CT ABDOMEN PELVIS W CONTRAST  10/14/2017 6:19 AM      HISTORY: Right lower quadrant pain for one week.    TECHNIQUE: CT abdomen and pelvis with intravenous contrast. Radiation  dose for this scan was reduced using automated exposure control,  adjustment of the mA and/or kV according to patient size, or iterative                                reconstruction technique. 76 mL  [id number].     COMPARISON: None.    FINDINGS:  Abdomen: There is minimal dependent atelectasis at the lung bases. The  heart size is normal. The liver, spleen, gallbladder, pancreas and  adrenal glands are normal in appearance. There is an area of decreased  enhancement at the upper pole of the right kidney posteriorly. Minimal  fluid stranding in the adjacent fat. The kidneys otherwise appear  normal. There is no abdominal or pelvic lymph node enlargement.    Pelvis: Bilateral tubal ligation clips in place. The uterus and adnexa  otherwise appear normal. There is no bowel  obstruction or  inflammation. No free intraperitoneal gas or fluid. The appendix is  not seen but there are no findings suspicious for appendicitis.      Impression    IMPRESSION:  1. An area of decreased enhancement in the upper pole of the right  kidney may be due to pyelonephritis. No focal abscess.  2. No other acute abnormality. No bowel obstruction or inflammation.    CINDY NOVOA MD   CBC with platelets differential   Result Value Ref Range    WBC 7.8 4.0 - 11.0 10e9/L    RBC Count 4.05 3.8 - 5.2 10e12/L    Hemoglobin 12.9 11.7 - 15.7 g/dL    Hematocrit 39.0 35.0 - 47.0 %    MCV 96 78 - 100 fl    MCH 31.9 26.5 - 33.0 pg    MCHC 33.1 31.5 - 36.5 g/dL    RDW 12.3 10.0 - 15.0 %    Platelet Count 217 150 - 450 10e9/L    Diff Method Automated Method     % Neutrophils 74.7 %    % Lymphocytes 13.5 %    % Monocytes 10.7 %    % Eosinophils 0.6 %    % Basophils 0.4 %    % Immature Granulocytes 0.1 %    Absolute Neutrophil 5.8 1.6 - 8.3 10e9/L    Absolute Lymphocytes 1.1 0.8 - 5.3 10e9/L    Absolute Monocytes 0.8 0.0 - 1.3 10e9/L    Absolute Eosinophils 0.1 0.0 - 0.7 10e9/L    Absolute Basophils 0.0 0.0 - 0.2 10e9/L    Abs Immature Granulocytes 0.0 0 - 0.4 10e9/L   Comprehensive metabolic panel   Result Value Ref Range    Sodium 141 133 - 144 mmol/L    Potassium 3.8 3.4 - 5.3 mmol/L    Chloride 110 (H) 94 - 109 mmol/L    Carbon Dioxide 23 20 - 32 mmol/L    Anion Gap 8 3 - 14 mmol/L    Glucose 129 (H) 70 - 99 mg/dL    Urea Nitrogen 10 7 - 30 mg/dL    Creatinine 0.64 0.52 - 1.04 mg/dL    GFR Estimate >90 >60 mL/min/1.7m2    GFR Estimate If Black >90 >60 mL/min/1.7m2    Calcium 8.5 8.5 - 10.1 mg/dL    Bilirubin Total 0.3 0.2 - 1.3 mg/dL    Albumin 3.4 3.4 - 5.0 g/dL    Protein Total 7.2 6.8 - 8.8 g/dL    Alkaline Phosphatase 105 40 - 150 U/L    ALT 18 0 - 50 U/L    AST 16 0 - 45 U/L   Lipase   Result Value Ref Range    Lipase 155 73 - 393 U/L   Lactic acid whole blood   Result Value Ref Range    Lactic Acid 1.4 0.7 -  2.0 mmol/L   CRP inflammation   Result Value Ref Range    CRP Inflammation 60.3 (H) 0.0 - 8.0 mg/L   UA with Microscopic   Result Value Ref Range    Color Urine Straw     Appearance Urine Clear     Glucose Urine Negative NEG^Negative mg/dL    Bilirubin Urine Negative NEG^Negative    Ketones Urine Negative NEG^Negative mg/dL    Specific Gravity Urine 1.010 1.003 - 1.035    Blood Urine Large (A) NEG^Negative    pH Urine 6.0 5.0 - 7.0 pH    Protein Albumin Urine Negative NEG^Negative mg/dL    Urobilinogen mg/dL 0.0 0.0 - 2.0 mg/dL    Nitrite Urine Negative NEG^Negative    Leukocyte Esterase Urine Negative NEG^Negative    Source Midstream Urine     WBC Urine 3 (H) 0 - 2 /HPF    RBC Urine 4 (H) 0 - 2 /HPF    Bacteria Urine Few (A) NEG^Negative /HPF    Squamous Epithelial /HPF Urine 1 0 - 1 /HPF    Mucous Urine Present (A) NEG^Negative /LPF   HCG qualitative urine   Result Value Ref Range    HCG Qual Urine Negative NEG^Negative            Labs Ordered and Resulted from Time of ED Arrival Up to the Time of Departure from the ED   COMPREHENSIVE METABOLIC PANEL - Abnormal; Notable for the following:        Result Value    Chloride 110 (*)     Glucose 129 (*)     All other components within normal limits   CRP INFLAMMATION - Abnormal; Notable for the following:     CRP Inflammation 60.3 (*)     All other components within normal limits   ROUTINE UA WITH MICROSCOPIC - Abnormal; Notable for the following:     Blood Urine Large (*)     WBC Urine 3 (*)     RBC Urine 4 (*)     Bacteria Urine Few (*)     Mucous Urine Present (*)     All other components within normal limits   CBC WITH PLATELETS DIFFERENTIAL   LIPASE   LACTIC ACID WHOLE BLOOD   HCG QUALITATIVE URINE   PERIPHERAL IV CATHETER   FREE WATER       Assessments & Plan (with Medical Decision Making)  Maranda Figueroa is a 47-year-old female presents to the ED for evaluation of bilateral low back pain over the last week that's now moved into her right lower quadrant.  In  addition to this, she's also had a low-grade fever.  She reportedly suffers from irritable bowel syndrome and is usually having loose stools, however, she's not had a bowel movement in several days.  In addition, she's also run out of her antihypertensive and presents with a blood pressure 186/112.  On examination, she has significant tenderness in the right lower quadrant with hypoactive bowel sounds, no guarding, or rebound tenderness.  She has mild tachycardia with pulse rate of 115 and is anxious.  The remainder of her exam is unremarkable.  Labs were obtained and show a normal CBC and comprehensive metabolic panel.  Her C-reactive protein is markedly elevated at 60.3.  Her lipase is normal at 155.  Her lactic acid is normal 1.4.  Urinalysis shows 3 rbc's and 4 wbc's but no leukocyte esterase or nitrite.  CT of the abdomen and pelvis was unremarkable for the source of her abdominal pain.  No evidence for acute appendicitis.  Reexamining the patient upon returning from CT, I find that she has significant tenderness at the inguinal fold and the right rectus oblique muscle that is accentuated with leg raise which may signify an abdominal muscle strain.  We will treat her pain with tramadol.  I did review with her these findings and gave her recommendations as far as no heavy lifting, significant bending or twisting, or straining.  She asked in addition to refill her diltiazem as she is currently out which I will gladly do.  I reviewed with her indications return to the ED for reevaluation.  All questions from the patient were answered and she was suitable for discharge in satisfactory condition.       I have reviewed the nursing notes.    I have reviewed the findings, diagnosis, plan and need for follow up with the patient.       New Prescriptions    TRAMADOL (ULTRAM) 50 MG TABLET    Take 1 tablet (50 mg) by mouth every 6 hours as needed for pain maximum 6 tablet(s) per day       Final diagnoses:   Abdominal pain,  right lower quadrant       10/14/2017   [facility] EMERGENCY DEPARTMENT     Raul Waddell MD  10/14/17 0731

## 2017-10-14 NOTE — ED AVS SNAPSHOT
[facility] Emergency Department    [address] [place] [address]    Phone:  [phone]    Fax:  [phone]                                       Maranda Figueroa   MRN: [id number]    Department:  [facility] Emergency Department   Date of Visit:  10/14/2017           After Visit Summary Signature Page     I have received my discharge instructions, and my questions have been answered. I have discussed any challenges I see with this plan with the nurse or doctor.    ..........................................................................................................................................  Patient/Patient Representative Signature      ..........................................................................................................................................  Patient Representative Print Name and Relationship to Patient    ..................................................               ................................................  Date                                            Time    ..........................................................................................................................................  Reviewed by Signature/Title    ...................................................              ..............................................  Date                                                            Time

## 2017-10-30 ENCOUNTER — TELEPHONE (OUTPATIENT)
Dept: FAMILY MEDICINE | Facility: OTHER | Age: 47
End: 2017-10-30

## 2017-10-30 NOTE — LETTER
[facility]  [address] [place] [address]  Phone: [phone]  November 2, 2017      Maranda Figueroa  [address] [facility] [address]      Dear Maranda,    We care about your health and have reviewed your health plan including your medical conditions, medications, and lab results.  Based on this review, it is recommended that you follow up regarding the following health topic(s):  -High Blood Pressure  -Cervical Cancer Screening    We recommend you take the following action(s):  -schedule a FREE FLOAT MA-ONLY BLOOD PRESSURE APPOINTMENT within the next 1-4 weeks.  -schedule a PAP SMEAR EXAM which is due.  Please disregard this reminder if you have had this exam elsewhere within the last year.  It would be helpful for us to have a copy of your recent pap smear report to update your records.     Please call us at the [facility] - [phone] (or use [employer]) to address the above recommendations.     Thank you for trusting [facility] and we appreciate the opportunity to serve you.  We look forward to supporting your healthcare needs in the future.    Healthy Regards,    Your Health Care Team  [facility]

## 2017-10-30 NOTE — TELEPHONE ENCOUNTER
Summary:    Patient is due/failing the following:   BP CHECK, PAP and PHQ9    Action needed:   Patient needs office visit for PAP. and Patient needs to do PHQ9.    Type of outreach:    Phone, spoke to patient.  patient will call back to schedule     Questions for provider review:    None                                                                                                                                    Lidia Jhaveri       Chart routed to Care Team .      Panel Management Review      Patient has the following on her problem list:     Depression / Dysthymia review    Measure:  Needs PHQ-9 score of 4 or less during index window.  Administer PHQ-9 and if score is 5 or more, send encounter to provider for next steps.        PHQ-9 SCORE 3/28/2016 10/17/2016 7/7/2017   Total Score - - -   Total Score 4 13 9       If PHQ-9 recheck is 5 or more, route to provider for next steps.    Patient is due for:  PHQ9    Hypertension   Last three blood pressure readings:  BP Readings from Last 3 Encounters:   10/14/17 (!) 147/98   07/22/17 (!) 166/119   07/13/17 (!) 157/98     Blood pressure: Failed     HTN Guidelines:  Age 18-59 BP range:  Less than 140/90  Age 60-85 with Diabetes:  Less than 140/90  Age 60-85 without Diabetes:  less than 150/90          Composite cancer screening  Chart review shows that this patient is due/due soon for the following Pap Smear

## 2018-01-08 NOTE — PROGRESS NOTES
"  SUBJECTIVE:                                                    Maranda Figueroa is a 47 year old female who presents to clinic today for the following health issues:      History of Present Illness     Depression & Anxiety Follow-up:     Depression/Anxiety:  Depression & Anxiety    Status since last visit::  Worsened (heavy chest, heart fluttering and racing, not sleeping well)    Other associated symptoms of depression and anxiety::  YES    Significant life event::  No (but did have something at home that happened last night)    Current substance use::  Alcohol    Hyperlipidemia:     Low fat/chol diet rating::  Not monitoring fat    Taking Statins::  No    Lipid Medications or Supplements::  None    Hypertension:     Outpatient blood pressures:  Are not being checked    Dietary sodium intake::  Not monitoring salt intake    Migraines:     Headache Symptoms are:  Stable    Migraine frequency::  2 per week    Migraine Duration::  2 hours    Ability to perform ADL's::  Yes    Migraine Rescue/Relief Medications::  Sumatriptan (Imitrex)    Effectiveness of rescue/relief medications::  Minor relief    Migraine Preventative Medications::  None    Neurological symptoms::  Numbness and Weakness    ER or UC Visits::  None    Diet:  Regular (no restrictions)  Frequency of exercise:  None  Taking medications regularly:  No  Barriers to taking medications:  Problems remembering to take them and Other  Additional concerns today:  YES      Problem list and histories reviewed & adjusted, as indicated.  Additional history: as documented    Labs reviewed in EPIC    ROS:  Constitutional, HEENT, cardiovascular, pulmonary, gi and gu systems are negative, except as otherwise noted.      OBJECTIVE:   BP (!) 168/110  Pulse 88  Temp 97.6  F (36.4  C) (Temporal)  Resp 16  Ht 5' 2\" (1.575 m)  Wt 154 lb (69.9 kg)  BMI 28.17 kg/m2  Body mass index is 28.17 kg/(m^2).  GENERAL: healthy, alert and no distress  EYES: Eyes grossly normal to " inspection, PERRL and conjunctivae and sclerae normal  HENT: ear canals and TM's normal, nose and mouth without ulcers or lesions  NECK: no adenopathy, no asymmetry, masses, or scars and thyroid normal to palpation  RESP: lungs clear to auscultation - no rales, rhonchi or wheezes  CV: regular rate and rhythm, normal S1 S2, no S3 or S4, no murmur, click or rub, no peripheral edema and peripheral pulses strong  ABDOMEN: soft, nontender, no hepatosplenomegaly, no masses and bowel sounds normal  MS: no gross musculoskeletal defects noted, no edema  SKIN: no suspicious lesions or rashes  NEURO: Normal strength and tone, mentation intact and speech normal  PSYCH: mentation appears normal, affect normal/bright    Diagnostic Test Results:  EKG - negative    ASSESSMENT/PLAN:     1. Major depressive disorder, recurrent episode, moderate (H)  PHQ-9 SCORE 10/17/2016 7/7/2017 1/10/2018   Total Score - - -   Total Score MyChart - - 9 (Mild depression)   Total Score 13 9 9   Stable. Continue bupropion.  - buPROPion (WELLBUTRIN SR) 150 MG 12 hr tablet; Take 1 tablet (150 mg) by mouth 2 times daily  Dispense: 180 tablet; Refill: 1    2. SILVIA (generalized anxiety disorder)  - buPROPion (WELLBUTRIN SR) 150 MG 12 hr tablet; Take 1 tablet (150 mg) by mouth 2 times daily  Dispense: 180 tablet; Refill: 1    3. Acute non intractable tension-type headache  Stable. Continue Flexeril as needed for neck tension which are a trigger for migraines.    - cyclobenzaprine (FLEXERIL) 5 MG tablet; Take 1-2 tablets (5-10 mg) by mouth nightly as needed for muscle spasms (neck tension)  Dispense: 60 tablet; Refill: 1  - rizatriptan (MAXALT) 5 MG tablet; Take 1-2 tablets (5-10 mg) by mouth at onset of headache for migraine May repeat in 2 hours. Max 6 tablets/24 hours.  Dispense: 18 tablet; Refill: 6    4. Gastroesophageal reflux disease without esophagitis  Chronic GERD. Continue omeprazole which is controlling symptoms.   - omeprazole (PRILOSEC) 20 MG CR  capsule; Take 1 capsule (20 mg) by mouth daily  Dispense: 90 capsule; Refill: 3    5. Essential hypertension with goal blood pressure less than 140/90  Worsened. Increase dose of diltiazem   - diltiazem (CARDIZEM CD) 240 MG 24 hr capsule; Take 1 capsule (240 mg) by mouth daily  Dispense: 30 capsule; Refill: 0    6. Chest pain  Likely due to anxiety. EKG normal.   - EKG 12-lead complete w/read - Clinics    MANUEL Ramires CNP  [facility] [employer] [facility]

## 2018-01-10 ENCOUNTER — OFFICE VISIT (OUTPATIENT)
Dept: FAMILY MEDICINE | Facility: OTHER | Age: 48
End: 2018-01-10
Payer: COMMERCIAL

## 2018-01-10 DIAGNOSIS — Z12.4 SCREENING FOR MALIGNANT NEOPLASM OF CERVIX: ICD-10-CM

## 2018-01-10 DIAGNOSIS — R07.9 CHEST PAIN, UNSPECIFIED TYPE: ICD-10-CM

## 2018-01-10 DIAGNOSIS — G44.209 ACUTE NON INTRACTABLE TENSION-TYPE HEADACHE: ICD-10-CM

## 2018-01-10 DIAGNOSIS — K21.9 GASTROESOPHAGEAL REFLUX DISEASE WITHOUT ESOPHAGITIS: ICD-10-CM

## 2018-01-10 DIAGNOSIS — I10 ESSENTIAL HYPERTENSION WITH GOAL BLOOD PRESSURE LESS THAN 140/90: ICD-10-CM

## 2018-01-10 DIAGNOSIS — F33.1 MAJOR DEPRESSIVE DISORDER, RECURRENT EPISODE, MODERATE (H): Primary | ICD-10-CM

## 2018-01-10 DIAGNOSIS — F41.1 GAD (GENERALIZED ANXIETY DISORDER): ICD-10-CM

## 2018-01-10 PROCEDURE — 93000 ELECTROCARDIOGRAM COMPLETE: CPT | Performed by: STUDENT IN AN ORGANIZED HEALTH CARE EDUCATION/TRAINING PROGRAM

## 2018-01-10 PROCEDURE — 99214 OFFICE O/P EST MOD 30 MIN: CPT | Performed by: STUDENT IN AN ORGANIZED HEALTH CARE EDUCATION/TRAINING PROGRAM

## 2018-01-10 RX ORDER — RIZATRIPTAN BENZOATE 5 MG/1
5-10 TABLET ORAL
Qty: 18 TABLET | Refills: 6 | Status: SHIPPED | OUTPATIENT
Start: 2018-01-10 | End: 2018-03-01

## 2018-01-10 RX ORDER — HYDROCODONE BITARTRATE AND ACETAMINOPHEN 5; 325 MG/1; MG/1
1-2 TABLET ORAL EVERY 6 HOURS PRN
Qty: 15 TABLET | Refills: 0 | Status: CANCELLED | OUTPATIENT
Start: 2018-01-10

## 2018-01-10 RX ORDER — CYCLOBENZAPRINE HCL 5 MG
5-10 TABLET ORAL
Qty: 60 TABLET | Refills: 1 | Status: SHIPPED | OUTPATIENT
Start: 2018-01-10 | End: 2021-08-20

## 2018-01-10 RX ORDER — TRAMADOL HYDROCHLORIDE 50 MG/1
50 TABLET ORAL EVERY 6 HOURS PRN
Qty: 20 TABLET | Refills: 0 | Status: CANCELLED | OUTPATIENT
Start: 2018-01-10

## 2018-01-10 RX ORDER — DILTIAZEM HYDROCHLORIDE 240 MG/1
240 CAPSULE, COATED, EXTENDED RELEASE ORAL DAILY
Qty: 30 CAPSULE | Refills: 0 | Status: SHIPPED | OUTPATIENT
Start: 2018-01-10 | End: 2020-01-24

## 2018-01-10 RX ORDER — BUPROPION HYDROCHLORIDE 150 MG/1
150 TABLET, EXTENDED RELEASE ORAL 2 TIMES DAILY
Qty: 180 TABLET | Refills: 1 | Status: SHIPPED | OUTPATIENT
Start: 2018-01-10 | End: 2020-01-24

## 2018-01-10 RX ORDER — DILTIAZEM HYDROCHLORIDE 180 MG/1
180 CAPSULE, COATED, EXTENDED RELEASE ORAL DAILY
Qty: 30 CAPSULE | Refills: 5 | Status: CANCELLED | OUTPATIENT
Start: 2018-01-10

## 2018-01-10 ASSESSMENT — PATIENT HEALTH QUESTIONNAIRE - PHQ9
SUM OF ALL RESPONSES TO PHQ QUESTIONS 1-9: 9
10. IF YOU CHECKED OFF ANY PROBLEMS, HOW DIFFICULT HAVE THESE PROBLEMS MADE IT FOR YOU TO DO YOUR WORK, TAKE CARE OF THINGS AT HOME, OR GET ALONG WITH OTHER PEOPLE: SOMEWHAT DIFFICULT
SUM OF ALL RESPONSES TO PHQ QUESTIONS 1-9: 9

## 2018-01-10 ASSESSMENT — PAIN SCALES - GENERAL: PAINLEVEL: MILD PAIN (2)

## 2018-01-10 NOTE — PATIENT INSTRUCTIONS
Increase dose of diltiazem to 240 mg daily.  See me back in 2 week to recheck blood pressure.    Refills of Wellbutrin, omeprazole, Maxalt and flexeril sent to pharmacy.    Go the the ER if your chest pain worsens or you become short of breath.    Lianet Gandara, DENICE-C

## 2018-01-10 NOTE — MR AVS SNAPSHOT
After Visit Summary   1/10/2018    Maranda Figueroa    MRN: [id number]           Patient Information     Date Of Birth          1970        Visit Information        Provider Department      1/10/2018 8:20 AM Lianet Gandara APRN [facility]        Today's Diagnoses     Chest pain    -  1    Screening for malignant neoplasm of cervix        Need for prophylactic vaccination and inoculation against influenza        Major depressive disorder, recurrent episode, moderate (H)        SILVIA (generalized anxiety disorder)        Acute intractable tension-type headache        Gastroesophageal reflux disease without esophagitis        Essential hypertension with goal blood pressure less than 140/90        HTN, goal below 140/90          Care Instructions    Increase dose of diltiazem to 240 mg daily.  See me back in 2 week to recheck blood pressure.    Refills of Wellbutrin, omeprazole, Maxalt and flexeril sent to pharmacy.    Go the the ER if your chest pain worsens or you become short of breath.    Lianet Gandara NP-C            Follow-ups after your visit        Who to contact     If you have questions or need follow up information about today's clinic visit or your schedule please contact [facility] directly at [phone].  Normal or non-critical lab and imaging results will be communicated to you by MyChart, letter or phone within 4 business days after the clinic has received the results. If you do not hear from us within 7 days, please contact the clinic through [employer] or phone. If you have a critical or abnormal lab result, we will notify you by phone as soon as possible.  Submit refill requests through [employer] or call your pharmacy and they will forward the refill request to us. Please allow 3 business days for your refill to be completed.          Additional Information About Your Visit        MyChart Information     [employer] lets you send messages to  "your doctor, view your test results, renew your prescriptions, schedule appointments and more. To sign up, go to [email or web address] . Click on \"Log in\" on the left side of the screen, which will take you to the Welcome page. Then click on \"Sign up Now\" on the right side of the page.     You will be asked to enter the access code listed below, as well as some personal information. Please follow the directions to create your username and password.     Your access code is: [email or web address]  Expires: 2018  6:29 AM     Your access code will  in 90 days. If you need help or a new code, please call your [facility] clinic or [phone].        Care EveryWhere ID     This is your Care EveryWhere ID. This could be used by other organizations to access your [facility] medical records  [id number]        Your Vitals Were     Pulse Temperature Respirations Height BMI (Body Mass Index)       88 97.6  F (36.4  C) (Temporal) 16 5' 2\" (1.575 m) 28.17 kg/m2        Blood Pressure from Last 3 Encounters:   01/10/18 (!) 105/96   10/14/17 (!) 147/98   17 (!) 166/119    Weight from Last 3 Encounters:   01/10/18 154 lb (69.9 kg)   10/14/17 155 lb (70.3 kg)   17 155 lb (70.3 kg)              We Performed the Following     EKG 12-lead complete w/read - Clinics          Today's Medication Changes          These changes are accurate as of: 1/10/18  9:37 AM.  If you have any questions, ask your nurse or doctor.               These medicines have changed or have updated prescriptions.        Dose/Directions    buPROPion 150 MG 12 hr tablet   Commonly known as:  WELLBUTRIN SR   This may have changed:    - how much to take  - how to take this  - when to take this  - additional instructions   Used for:  Major depressive disorder, recurrent episode, moderate (H), SILVIA (generalized anxiety disorder)   Changed by:  Lianet Gandara APRN CNP        Dose:  150 mg   Take 1 tablet (150 mg) by mouth 2 times daily "   Quantity:  180 tablet   Refills:  1       cyclobenzaprine 5 MG tablet   Commonly known as:  FLEXERIL   This may have changed:  how much to take   Used for:  Acute intractable tension-type headache   Changed by:  Lianet Gandara APRN CNP        Dose:  5-10 mg   Take 1-2 tablets (5-10 mg) by mouth nightly as needed for muscle spasms (neck tension)   Quantity:  60 tablet   Refills:  1       * diltiazem 180 MG 24 hr capsule   Commonly known as:  CARDIZEM CD   This may have changed:  Another medication with the same name was added. Make sure you understand how and when to take each.   Used for:  Essential hypertension with goal blood pressure less than 140/90        Dose:  180 mg   Take 1 capsule (180 mg) by mouth daily   Quantity:  30 capsule   Refills:  5       * diltiazem 240 MG 24 hr capsule   Commonly known as:  CARDIZEM CD   This may have changed:  You were already taking a medication with the same name, and this prescription was added. Make sure you understand how and when to take each.   Used for:  HTN, goal below 140/90   Changed by:  Lianet Gandara APRN CNP        Dose:  240 mg   Take 1 capsule (240 mg) by mouth daily   Quantity:  30 capsule   Refills:  0       * Notice:  This list has 2 medication(s) that are the same as other medications prescribed for you. Read the directions carefully, and ask your doctor or other care provider to review them with you.         Where to get your medicines      These medications were sent to [facility] - [address]  [address] [place] [address]     Phone:  [phone]     buPROPion 150 MG 12 hr tablet    cyclobenzaprine 5 MG tablet    diltiazem 240 MG 24 hr capsule    omeprazole 20 MG CR capsule    rizatriptan 5 MG tablet                Primary Care Provider Fax #    Physician No Ref-Primary [phone]       No address on file        Equal Access to Services     MARIBELL BRANHAM AH: Rupal brown  yady Blount, wavenkatada luqadaha, qaybta kaalmada jayde, henrik benton lagómezasim sayda. So [facility] [phone].    ATENCIÓN: Si habla viraj, tiene a moreira disposición servicios gratuitos de asistencia lingüística. Tova al [phone].    We comply with applicable federal civil rights laws and [place] laws. We do not discriminate on the basis of race, color, national origin, age, disability, sex, sexual orientation, or gender identity.            Thank you!     Thank you for choosing [facility]  for your care. Our goal is always to provide you with excellent care. Hearing back from our patients is one way we can continue to improve our services. Please take a few minutes to complete the written survey that you may receive in the mail after your visit with us. Thank you!             Your Updated Medication List - Protect others around you: Learn how to safely use, store and throw away your medicines at www.disposemymeds.org.          This list is accurate as of: 1/10/18  9:37 AM.  Always use your most recent med list.                   Brand Name Dispense Instructions for use Diagnosis    buPROPion 150 MG 12 hr tablet    WELLBUTRIN SR    180 tablet    Take 1 tablet (150 mg) by mouth 2 times daily    Major depressive disorder, recurrent episode, moderate (H), SILVIA (generalized anxiety disorder)       cyclobenzaprine 5 MG tablet    FLEXERIL    60 tablet    Take 1-2 tablets (5-10 mg) by mouth nightly as needed for muscle spasms (neck tension)    Acute intractable tension-type headache       * diltiazem 180 MG 24 hr capsule    CARDIZEM CD    30 capsule    Take 1 capsule (180 mg) by mouth daily    Essential hypertension with goal blood pressure less than 140/90       * diltiazem 240 MG 24 hr capsule    CARDIZEM CD    30 capsule    Take 1 capsule (240 mg) by mouth daily    HTN, goal below 140/90       HYDROcodone-acetaminophen 5-325 MG per tablet    NORCO    15 tablet    Take 1-2 tablets by  mouth every 6 hours as needed for moderate to severe pain        ibuprofen 600 MG tablet    ADVIL/MOTRIN    40 tablet    Take 1 tablet (600 mg) by mouth every 6 hours as needed for moderate pain        omeprazole 20 MG CR capsule    priLOSEC    90 capsule    Take 1 capsule (20 mg) by mouth daily    Gastroesophageal reflux disease without esophagitis       rizatriptan 5 MG tablet    MAXALT    18 tablet    Take 1-2 tablets (5-10 mg) by mouth at onset of headache for migraine May repeat in 2 hours. Max 6 tablets/24 hours.    Acute intractable tension-type headache       traMADol 50 MG tablet    ULTRAM    20 tablet    Take 1 tablet (50 mg) by mouth every 6 hours as needed for pain maximum 6 tablet(s) per day        * Notice:  This list has 2 medication(s) that are the same as other medications prescribed for you. Read the directions carefully, and ask your doctor or other care provider to review them with you.

## 2018-01-11 ASSESSMENT — PATIENT HEALTH QUESTIONNAIRE - PHQ9: SUM OF ALL RESPONSES TO PHQ QUESTIONS 1-9: 9

## 2018-01-15 VITALS
SYSTOLIC BLOOD PRESSURE: 168 MMHG | TEMPERATURE: 97.6 F | DIASTOLIC BLOOD PRESSURE: 110 MMHG | BODY MASS INDEX: 28.34 KG/M2 | HEART RATE: 88 BPM | HEIGHT: 62 IN | RESPIRATION RATE: 16 BRPM | WEIGHT: 154 LBS

## 2018-01-26 RX ORDER — TRAMADOL HYDROCHLORIDE 50 MG/1
50 TABLET ORAL EVERY 6 HOURS PRN
Qty: 20 TABLET | Refills: 0 | OUTPATIENT
Start: 2018-01-26

## 2018-01-31 RX ORDER — TRAMADOL HYDROCHLORIDE 50 MG/1
50 TABLET ORAL EVERY 6 HOURS PRN
Qty: 20 TABLET | Refills: 0 | OUTPATIENT
Start: 2018-01-31

## 2018-02-19 ENCOUNTER — TELEPHONE (OUTPATIENT)
Dept: FAMILY MEDICINE | Facility: OTHER | Age: 48
End: 2018-02-19

## 2018-02-19 NOTE — TELEPHONE ENCOUNTER
Summary:    Patient is due/failing the following:   BP CHECK, PAP and PHQ9    Action needed:   Patient needs office visit for PAP and BP check. and Patient needs to do PHQ9.    Type of outreach:    Phone, left message for patient to call back.     Questions for provider review:    None                                                                                                                                    Lidia Jhaveri     Chart routed to Care Team .      Panel Management Review      Patient has the following on her problem list:     Depression / Dysthymia review    Measure:  Needs PHQ-9 score of 4 or less during index window.  Administer PHQ-9 and if score is 5 or more, send encounter to provider for next steps.        PHQ-9 SCORE 10/17/2016 7/7/2017 1/10/2018   Total Score - - -   Total Score MyChart - - 9 (Mild depression)   Total Score 13 9 9       If PHQ-9 recheck is 5 or more, route to provider for next steps.    Patient is due for:  PHQ9    Hypertension   Last three blood pressure readings:  BP Readings from Last 3 Encounters:   01/15/18 (!) 168/110   10/14/17 (!) 147/98   07/22/17 (!) 166/119     Blood pressure: FAILED    HTN Guidelines:  Age 18-59 BP range:  Less than 140/90  Age 60-85 with Diabetes:  Less than 140/90  Age 60-85 without Diabetes:  less than 150/90      Composite cancer screening  Chart review shows that this patient is due/due soon for the following Pap Smear

## 2018-02-28 NOTE — PROGRESS NOTES
SUBJECTIVE:   Maranda Figueroa is a 47 year old female who presents to clinic today for the following health issues:    History of Present Illness     Hypertension:     Outpatient blood pressures:  Are not being checked    Dietary sodium intake::  Not monitoring salt intake    Migraines:     Headache Symptoms are:  Worsening    Migraine frequency::  3 per week    Migraine Duration::  2 days    Ability to perform ADL's::  Yes    Migraine Rescue/Relief Medications::  Sumatriptan (Imitrex)    Effectiveness of rescue/relief medications::  Moderate relief    Migraine Preventative Medications::  None    Neurological symptoms::  Numbness, Weakness and Loss of vision    ER or UC Visits::  None    Diet:  Regular (no restrictions)  Frequency of exercise:  None  Taking medications regularly:  Yes  Medication side effects:  Lightheadedness  Additional concerns today:  YES    Problem list and histories reviewed & adjusted, as indicated.  Additional history: as documented    Patient Active Problem List   Diagnosis     Anxiety state     Major depression in complete remission (H)     Hearing impairment     Insomnia     HTN, goal below 140/90     Vitamin D deficiency     Gastroesophageal reflux disease without esophagitis     Panic attack     Intractable chronic migraine without aura and with status migrainosus     Past Surgical History:   Procedure Laterality Date     ESOPHAGOSCOPY, GASTROSCOPY, DUODENOSCOPY (EGD), COMBINED N/A 10/5/2015    Procedure: COMBINED ESOPHAGOSCOPY, GASTROSCOPY, DUODENOSCOPY (EGD), BIOPSY SINGLE OR MULTIPLE;  Surgeon: Calin Kenyon MD;  Location: [facility]     OPEN REDUCTION INTERNAL FIXATION RODDING INTRAMEDULLARY TIBIA  11/6/2011    Procedure:OPEN REDUCTION INTERNAL FIXATION RODDING INTRAMEDULLARY TIBIA; Open Reduction internal fixation rodding right tibia; Surgeon:JUNO TUTTLE; Location:[facility]     TYMPANOPLASTY, RT/LT      Tympanoplasty/LT       Social History   Substance Use Topics     Smoking  status: Former Smoker     Quit date: 6/1/1994     Smokeless tobacco: Never Used     Alcohol use 1.0 oz/week      Comment: Occ.     Family History   Problem Relation Age of Onset     DIABETES Mother      Hypertension Mother      Thyroid Disease Mother      Cancer - colorectal Mother      DIABETES Father      Hypertension Father      HEART DISEASE Maternal Grandmother      DIABETES Maternal Grandfather          Current Outpatient Prescriptions   Medication Sig Dispense Refill     losartan-hydrochlorothiazide (HYZAAR) 50-12.5 MG per tablet Take 1 tablet by mouth daily 90 tablet 1     rizatriptan (MAXALT) 5 MG tablet Take 1-2 tablets (5-10 mg) by mouth at onset of headache for migraine May repeat in 2 hours. Max 6 tablets/24 hours. 18 tablet 6     buPROPion (WELLBUTRIN SR) 150 MG 12 hr tablet Take 1 tablet (150 mg) by mouth 2 times daily 180 tablet 1     cyclobenzaprine (FLEXERIL) 5 MG tablet Take 1-2 tablets (5-10 mg) by mouth nightly as needed for muscle spasms (neck tension) 60 tablet 1     omeprazole (PRILOSEC) 20 MG CR capsule Take 1 capsule (20 mg) by mouth daily 90 capsule 3     diltiazem (CARDIZEM CD) 240 MG 24 hr capsule Take 1 capsule (240 mg) by mouth daily 30 capsule 0     traMADol (ULTRAM) 50 MG tablet Take 1 tablet (50 mg) by mouth every 6 hours as needed for pain maximum 6 tablet(s) per day 20 tablet 0     ibuprofen (ADVIL/MOTRIN) 600 MG tablet Take 1 tablet (600 mg) by mouth every 6 hours as needed for moderate pain 40 tablet 0     [DISCONTINUED] diltiazem (CARDIZEM CD) 180 MG 24 hr capsule Take 1 capsule (180 mg) by mouth daily (Patient not taking: Reported on 3/1/2018) 30 capsule 5     Allergies   Allergen Reactions     Penicillins      Causes nausea with emesis     Recent Labs   Lab Test  10/14/17   0505  07/22/17   1435   10/17/16   0902  02/08/16   1014  09/22/15   1000   11/19/13   0951   LDL   --    --    --    --    --    --    --   136*   HDL   --    --    --    --    --    --    --   78   TRIG   " --    --    --    --    --    --    --   107   ALT  18   --    --    --   42  50   --    --    CR  0.64  0.72   < >   --   0.56  0.70  0.66   --    --    GFRESTIMATED  >90  88   < >   --   >90  Non  GFR Calc    >90  Non  GFR Calc    >90  Non  GFR Calc     --    --    GFRESTBLACK  >90  >90  African American GFR Calc     < >   --   >90   GFR Calc    >90   GFR Calc    >90   GFR Calc     --    --    POTASSIUM  3.8  3.9   < >   --   3.9  4.2  4.1   < >   --    TSH   --    --    --   1.63  1.73   --    --    --     < > = values in this interval not displayed.      BP Readings from Last 3 Encounters:   03/01/18 (!) 140/94   01/15/18 (!) 168/110   10/14/17 (!) 147/98    Wt Readings from Last 3 Encounters:   03/01/18 152 lb 6.4 oz (69.1 kg)   01/10/18 154 lb (69.9 kg)   10/14/17 155 lb (70.3 kg)                  Labs reviewed in EPIC    ROS:  Constitutional, HEENT, cardiovascular, pulmonary, gi and gu systems are negative, except as otherwise noted.    OBJECTIVE:     BP (!) 140/94 (Cuff Size: Adult Regular)  Pulse 94  Temp 98  F (36.7  C) (Temporal)  Resp 20  Ht 5' 2\" (1.575 m)  Wt 152 lb 6.4 oz (69.1 kg)  BMI 27.87 kg/m2  Body mass index is 27.87 kg/(m^2).  GENERAL: healthy, alert and no distress  HENT: ear canals and TM's normal, nose and mouth without ulcers or lesions  RESP: lungs clear to auscultation - no rales, rhonchi or wheezes  CV: regular rate and rhythm, normal S1 S2, no S3 or S4, no murmur, click or rub, no peripheral edema and peripheral pulses strong  MS: no gross musculoskeletal defects noted, no edema  SKIN: no suspicious lesions or rashes  NEURO: Normal strength and tone, mentation intact and speech normal  PSYCH: anxious and fatigued    Diagnostic Test Results:  No results found for this or any previous visit (from the past 24 hour(s)).    ASSESSMENT/PLAN:     1. Intractable chronic migraine without " aura and with status migrainosus  Concerns related to overall diagnosis are noted.  She needs to see neurology for this.  I suspect that more of a controlled medication is indicated.  We will try to get her blood pressure in a controlled hopefully help with triggering the headaches and have her see neurology.  Will make a migraine action plan once we have reviewed by neurology.  - losartan-hydrochlorothiazide (HYZAAR) 50-12.5 MG per tablet; Take 1 tablet by mouth daily  Dispense: 90 tablet; Refill: 1  - NEUROLOGY ADULT REFERRAL    2. HTN, goal below 140/90  Additional medication given needs to follow-up as directed.  - losartan-hydrochlorothiazide (HYZAAR) 50-12.5 MG per tablet; Take 1 tablet by mouth daily  Dispense: 90 tablet; Refill: 1  - NEUROLOGY ADULT REFERRAL  - Exercise Stress Echocardiogram; Future    3. Family history of ischemic heart disease  4. Atypical chest pain  Follow-up as directed.  - Exercise Stress Echocardiogram; Future    5. Acute non intractable tension-type headache  Long-term problem noted refilled medications as requested patient needs to be seen by neurology.  - rizatriptan (MAXALT) 5 MG tablet; Take 1-2 tablets (5-10 mg) by mouth at onset of headache for migraine May repeat in 2 hours. Max 6 tablets/24 hours.  Dispense: 18 tablet; Refill: 6    Neurology consult to be done.  I do not feel that tramadol is indicated for migraine headaches and would not refill this without the expressed written recommendation of the neurologist.    Scott Christie PA-C  [facility]  Answers for HPI/ROS submitted by the patient on 3/1/2018   PHQ-2 Score: 2  If you checked off any problems, how difficult have these problems made it for you to do your work, take care of things at home, or get along with other people?: Somewhat difficult  PHQ9 TOTAL SCORE: 7  SILVIA 7 TOTAL SCORE: 6

## 2018-03-01 ENCOUNTER — OFFICE VISIT (OUTPATIENT)
Dept: FAMILY MEDICINE | Facility: OTHER | Age: 48
End: 2018-03-01
Payer: COMMERCIAL

## 2018-03-01 VITALS
HEART RATE: 94 BPM | TEMPERATURE: 98 F | SYSTOLIC BLOOD PRESSURE: 140 MMHG | BODY MASS INDEX: 28.05 KG/M2 | RESPIRATION RATE: 20 BRPM | DIASTOLIC BLOOD PRESSURE: 94 MMHG | HEIGHT: 62 IN | WEIGHT: 152.4 LBS

## 2018-03-01 DIAGNOSIS — I10 HTN, GOAL BELOW 140/90: ICD-10-CM

## 2018-03-01 DIAGNOSIS — G43.711 INTRACTABLE CHRONIC MIGRAINE WITHOUT AURA AND WITH STATUS MIGRAINOSUS: Primary | ICD-10-CM

## 2018-03-01 DIAGNOSIS — R07.89 ATYPICAL CHEST PAIN: ICD-10-CM

## 2018-03-01 DIAGNOSIS — Z82.49 FAMILY HISTORY OF ISCHEMIC HEART DISEASE: ICD-10-CM

## 2018-03-01 DIAGNOSIS — G44.209 ACUTE NON INTRACTABLE TENSION-TYPE HEADACHE: ICD-10-CM

## 2018-03-01 PROCEDURE — 99214 OFFICE O/P EST MOD 30 MIN: CPT | Performed by: PHYSICIAN ASSISTANT

## 2018-03-01 RX ORDER — LOSARTAN POTASSIUM AND HYDROCHLOROTHIAZIDE 12.5; 5 MG/1; MG/1
1 TABLET ORAL DAILY
Qty: 90 TABLET | Refills: 1 | Status: SHIPPED | OUTPATIENT
Start: 2018-03-01 | End: 2018-08-03

## 2018-03-01 RX ORDER — RIZATRIPTAN BENZOATE 5 MG/1
5-10 TABLET ORAL
Qty: 18 TABLET | Refills: 6 | Status: SHIPPED | OUTPATIENT
Start: 2018-03-01 | End: 2020-02-10

## 2018-03-01 ASSESSMENT — ANXIETY QUESTIONNAIRES
GAD7 TOTAL SCORE: 6
1. FEELING NERVOUS, ANXIOUS, OR ON EDGE: SEVERAL DAYS
4. TROUBLE RELAXING: SEVERAL DAYS
7. FEELING AFRAID AS IF SOMETHING AWFUL MIGHT HAPPEN: SEVERAL DAYS
2. NOT BEING ABLE TO STOP OR CONTROL WORRYING: SEVERAL DAYS
GAD7 TOTAL SCORE: 6
3. WORRYING TOO MUCH ABOUT DIFFERENT THINGS: SEVERAL DAYS
6. BECOMING EASILY ANNOYED OR IRRITABLE: SEVERAL DAYS
5. BEING SO RESTLESS THAT IT IS HARD TO SIT STILL: NOT AT ALL
GAD7 TOTAL SCORE: 6
7. FEELING AFRAID AS IF SOMETHING AWFUL MIGHT HAPPEN: SEVERAL DAYS

## 2018-03-01 ASSESSMENT — PATIENT HEALTH QUESTIONNAIRE - PHQ9
10. IF YOU CHECKED OFF ANY PROBLEMS, HOW DIFFICULT HAVE THESE PROBLEMS MADE IT FOR YOU TO DO YOUR WORK, TAKE CARE OF THINGS AT HOME, OR GET ALONG WITH OTHER PEOPLE: SOMEWHAT DIFFICULT
SUM OF ALL RESPONSES TO PHQ QUESTIONS 1-9: 7
SUM OF ALL RESPONSES TO PHQ QUESTIONS 1-9: 7

## 2018-03-01 ASSESSMENT — PAIN SCALES - GENERAL: PAINLEVEL: NO PAIN (0)

## 2018-03-01 NOTE — PATIENT INSTRUCTIONS
Dietary Approaches to Stop Hypertension (The DASH Diet)   What is hypertension?   Hypertension is blood pressure that keeps being higher than normal. Blood pressure is the force of blood against artery walls as the heart pumps blood through the body. Blood pressure can be unhealthy if it is above 120/80. The higher your blood pressure, the greater the health risk.   High blood pressure can be controlled if you take these steps:   Maintain a healthy weight.   Are physically active.   Follow a healthy eating plan, which includes foods that do not have a lot of salt and sodium.   Do not drink a lot of alcohol.   Diet affects high blood pressure. Following the DASH diet and reducing the amount of sodium in your diet will help lower your blood pressure. It will also help prevent high blood pressure.   What is the DASH diet?   Dietary Approaches to Stop Hypertension (DASH) is a diet that is low in saturated fat, cholesterol, and total fat. It emphasizes fruits, vegetables, and low-fat dairy foods. The DASH diet also includes whole-grain products, fish, poultry, and nuts. It encourages fewer servings of red meat, sweets, and sugar-containing beverages. It is rich in magnesium, potassium, and calcium, as well as protein and fiber.   How do I get started on the DASH diet?   The DASH diet requires no special foods and has no hard-to-follow recipes. Start by seeing how DASH compares with your current eating habits.   The DASH eating plan illustrated below is based on a diet of 2,000 calories a day. Your healthcare provider or a dietitian can help you determine how many calories a day you need. Most adults need somewhere between 1600 and 2800 calories a day. Serving sizes for different foods vary from 1/2 cup to 1 and 1/4 cups. Check product nutrition labels for serving sizes and the number of calories per serving.                      Number of        Examples of  Food Group      servings          serving size  ----------------------------------------------------------------    Grains and      7 to 8 per day   1 slice of bread  grain products                   1 cup ready-to-eat cold cereal                                   1/2 cup cooked rice, pasta,                                   or cereal    Vegetables      4 to 5 per day   1 cup raw leafy vegetable                                   1/2 cup cooked vegetable                                   6 ounces (oz) vegetable juice      Fruits          4 to 5 per day   1 medium fruit                                   1/4 cup dried fruit                                   1/2 cup fresh, frozen, or                                   canned fruit                                   6 oz fruit juice      Low-fat or      2 to 3 per day   8 oz milk  fat-free                         1 cup yogurt  dairy foods                      1 and 1/2 oz cheese    Lean meats,  poultry,        2 or fewer per   3 oz cooked lean meat,  or fish         day              skinless poultry, or fish    Nuts, seeds,    4 to 5 per week  1/3 cup or 1 and 1/2 oz nuts  and dry beans                    1 tablespoon or 1/2 oz seeds                                   1/2 cup cooked dry beans    Fats and oils   2 to 3 per day   1 teaspoon soft margarine                                   1 tablespoon low-fat mayonnaise                                   2 tablespoons light salad                                   dressing                                   1 teaspoon vegetable oil    Sweets          5 per week       1 tablespoon sugar                                   1 tablespoon jelly or jam                                   1/2 oz jelly beans                                   8 oz lemonade  ----------------------------------------------------------------  Make changes gradually. Here are some suggestions that might help:   If you now eat 1 or 2 servings of vegetables a day, add a serving at lunch and  another at dinner.   If you have not been eating fruit regularly, or have only juice at breakfast, add a serving to your meals or have it as a snack.   Drink milk or water with lunch or dinner instead of soda, sugar-sweetened tea, or alcohol. Choose low-fat (1%) or fat-free (nonfat) dairy products so that you are eating fewer calories and less saturated fat, total fat, and cholesterol.   Read food labels on margarines and salad dressings to choose products lowest in fat.   If you now eat large portions of meat, slowly cut back--by a half or a third at each meal. Limit meat to 6 ounces a day (two 3-ounce servings). Three to 4 ounces is about the size of a deck of cards.   Have 2 or more meatless meals each week. Increase servings of vegetables, rice, pasta, and beans in all meals. Try casseroles, pasta, and stir-garnica dishes, which have less meat and more vegetables, grains, and beans.   Use fruits canned in their own juice. Fresh fruits require little or no preparation. Dried fruits are a good choice to carry with you or to have ready in the car.   Try these snacks ideas: unsalted pretzels or nuts mixed with raisins, sung crackers, low-fat and fat-free yogurt or frozen yogurt, popcorn with no salt or butter added, and raw vegetables.   Choose whole-grain foods to get more nutrients, including minerals and fiber. For example, choose whole-wheat bread, whole-grain cereals, or brown rice.   Use fresh, frozen, or no-salt-added canned vegetables.   Remember to also reduce the salt and sodium in your diet. Try to have no more than 2000 milligrams (mg) of sodium per day, with a goal of further reducing the sodium to 1500 mg per day. Three important ways to reduce sodium are:   Eat food products with reduced-sodium or no salt added.   Use less salt when you prepare foods and do not add salt to your food at the table.   Read food labels. Aim for foods that contain less than 5% of the daily value of sodium.   The DASH eating  plan is not designed for weight loss. But it contains many lower-calorie foods, such as fruits and vegetables. You can make it lower in calories by replacing high-calorie foods with more fruits and vegetables. Some ideas to increase fruits and vegetables and decrease calories include:   Eat a medium apple instead of 4 shortbread cookies. You'll save 80 calories.   Eat 1/4 cup of dried apricots instead of a 2-ounce bag of pork rinds. You'll save 230 calories.   Have a hamburger that's 3 ounces instead of 6 ounces. Add a 1/2 cup serving of carrots and a 1/2 cup serving of spinach. You'll save more than 200 calories.   Instead of 5 ounces of chicken, have a stir garnica with 2 ounces of chicken and 1 and 1/2 cups of raw vegetables. Use just a small amount of vegetable oil. You'll save 50 calories.   Have a 1/2 cup serving of low-fat frozen yogurt instead of a 1-and-1/2-ounce chocolate bar. You'll save about 110 calories.   Use low-fat or fat-free condiments, such as fat-free salad dressings.   Eat smaller portions. Cut back gradually.   Use food labels to compare fat and calorie content in packaged foods. Items marked low fat or fat free may be lower in fat but not lower in calories than their regular versions.   Limit foods with lots of added sugar, such as pies, flavored yogurts, candy bars, ice cream, sherbet, regular soft drinks, and fruit drinks.   Drink water or club soda instead of cola or other soda drinks.     For more information, see the National Heart, Lung, and [employer] [place] Web site at: http://www.nhlbi.nih.gov/health/public/heart/hbp/dash/.

## 2018-03-01 NOTE — NURSING NOTE
"Chief Complaint   Patient presents with     Headache     Recheck Medication     Panel Management     mychart, flu shot, pap, phq, sherly       Initial BP (!) 140/94 (Cuff Size: Adult Regular)  Pulse 94  Temp 98  F (36.7  C) (Temporal)  Resp 20  Ht 5' 2\" (1.575 m)  Wt 152 lb 6.4 oz (69.1 kg)  BMI 27.87 kg/m2 Estimated body mass index is 27.87 kg/(m^2) as calculated from the following:    Height as of this encounter: 5' 2\" (1.575 m).    Weight as of this encounter: 152 lb 6.4 oz (69.1 kg).  Medication Reconciliation: complete  "

## 2018-03-01 NOTE — LETTER
My Migraine Action Plan      Date: 3/1/2018     My Name: Maranda Figueroa   YOB: 1970  My Pharmacy:    [facility] [place] - [id number] [place] [facility]       My (Preventative) Control Medicine: pending        My Rescue Medicine: Maxalt   My Doctor: Lianet Gandara     My Clinic: [facility]  [address] [facility] [address] [phone]        GREEN ZONE = Good Control    My headache plan is working.   I can do what I need to do.           I WILL:     ? Keep managing my triggers.  ? Write in my migraine diary each time I have a headache.  ? Keep taking my preventive (controller) medicine daily.  ? Take my relief and rescue medicine as needed.             YELLOW ZONE = Not Enough Control    My headache plan isn t always working.   My headaches keep me from doing   some of the things I need to do.       I WILL:     ? Set goals to control my triggers and act on them.  ? Write in my migraine diary each time I have a headache and review it for                      patterns or new triggers.  ? Keep taking my preventive (controller) medicine daily.  ? Take my relief and rescue medicine as needed.  ? Call my doctor or clinic at if I stay in the Yellow Zone.             RED ZONE = Poor or No Control    My headache plan has  failed. I can t do anything  when I have one. My  medicines aren t working.           I WILL:   ? Set goals to control my triggers and act on them.  ? Write in my migraine diary each time I have a headache and review it for                      patterns or new triggers.  ? Keep taking my preventive (controller) medicine daily.  ? Take my relief and rescue medicine as needed.  ? Call my doctor or clinic or go to urgent care or an ER if I m having the worst                  headache of my life.  ? Call my doctor or clinic or go to urgent care or an ER if my medicine doesn t work.  ? Let my doctor  or clinic know within 2 weeks if I have gone to an urgent care or             emergency department.          Provider specific instructions:  Needs to be seen by Neurology.

## 2018-03-01 NOTE — MR AVS SNAPSHOT
After Visit Summary   3/1/2018    Maranda Figueroa    MRN: [id number]           Patient Information     Date Of Birth          1970        Visit Information        Provider Department      3/1/2018 11:20 AM Scott Harley PA-C [facility]        Today's Diagnoses     Intractable chronic migraine without aura and with status migrainosus    -  1    HTN, goal below 140/90        Family history of ischemic heart disease        Atypical chest pain        Acute non intractable tension-type headache          Care Instructions                                     Dietary Approaches to Stop Hypertension (The DASH Diet)   What is hypertension?   Hypertension is blood pressure that keeps being higher than normal. Blood pressure is the force of blood against artery walls as the heart pumps blood through the body. Blood pressure can be unhealthy if it is above 120/80. The higher your blood pressure, the greater the health risk.   High blood pressure can be controlled if you take these steps:   Maintain a healthy weight.   Are physically active.   Follow a healthy eating plan, which includes foods that do not have a lot of salt and sodium.   Do not drink a lot of alcohol.   Diet affects high blood pressure. Following the DASH diet and reducing the amount of sodium in your diet will help lower your blood pressure. It will also help prevent high blood pressure.   What is the DASH diet?   Dietary Approaches to Stop Hypertension (DASH) is a diet that is low in saturated fat, cholesterol, and total fat. It emphasizes fruits, vegetables, and low-fat dairy foods. The DASH diet also includes whole-grain products, fish, poultry, and nuts. It encourages fewer servings of red meat, sweets, and sugar-containing beverages. It is rich in magnesium, potassium, and calcium, as well as protein and fiber.   How do I get started on the DASH diet?   The DASH diet requires no special foods and has no hard-to-follow  recipes. Start by seeing how DASH compares with your current eating habits.   The DASH eating plan illustrated below is based on a diet of 2,000 calories a day. Your healthcare provider or a dietitian can help you determine how many calories a day you need. Most adults need somewhere between 1600 and 2800 calories a day. Serving sizes for different foods vary from 1/2 cup to 1 and 1/4 cups. Check product nutrition labels for serving sizes and the number of calories per serving.                      Number of        Examples of  Food Group      servings         serving size  ----------------------------------------------------------------    Grains and      7 to 8 per day   1 slice of bread  grain products                   1 cup ready-to-eat cold cereal                                   1/2 cup cooked rice, pasta,                                   or cereal    Vegetables      4 to 5 per day   1 cup raw leafy vegetable                                   1/2 cup cooked vegetable                                   6 ounces (oz) vegetable juice      Fruits          4 to 5 per day   1 medium fruit                                   1/4 cup dried fruit                                   1/2 cup fresh, frozen, or                                   canned fruit                                   6 oz fruit juice      Low-fat or      2 to 3 per day   8 oz milk  fat-free                         1 cup yogurt  dairy foods                      1 and 1/2 oz cheese    Lean meats,  poultry,        2 or fewer per   3 oz cooked lean meat,  or fish         day              skinless poultry, or fish    Nuts, seeds,    4 to 5 per week  1/3 cup or 1 and 1/2 oz nuts  and dry beans                    1 tablespoon or 1/2 oz seeds                                   1/2 cup cooked dry beans    Fats and oils   2 to 3 per day   1 teaspoon soft margarine                                   1 tablespoon low-fat mayonnaise                                    2 tablespoons light salad                                   dressing                                   1 teaspoon vegetable oil    Sweets          5 per week       1 tablespoon sugar                                   1 tablespoon jelly or jam                                   1/2 oz jelly beans                                   8 oz lemonade  ----------------------------------------------------------------  Make changes gradually. Here are some suggestions that might help:   If you now eat 1 or 2 servings of vegetables a day, add a serving at lunch and another at dinner.   If you have not been eating fruit regularly, or have only juice at breakfast, add a serving to your meals or have it as a snack.   Drink milk or water with lunch or dinner instead of soda, sugar-sweetened tea, or alcohol. Choose low-fat (1%) or fat-free (nonfat) dairy products so that you are eating fewer calories and less saturated fat, total fat, and cholesterol.   Read food labels on margarines and salad dressings to choose products lowest in fat.   If you now eat large portions of meat, slowly cut back--by a half or a third at each meal. Limit meat to 6 ounces a day (two 3-ounce servings). Three to 4 ounces is about the size of a deck of cards.   Have 2 or more meatless meals each week. Increase servings of vegetables, rice, pasta, and beans in all meals. Try casseroles, pasta, and stir-garnica dishes, which have less meat and more vegetables, grains, and beans.   Use fruits canned in their own juice. Fresh fruits require little or no preparation. Dried fruits are a good choice to carry with you or to have ready in the car.   Try these snacks ideas: unsalted pretzels or nuts mixed with raisins, sung crackers, low-fat and fat-free yogurt or frozen yogurt, popcorn with no salt or butter added, and raw vegetables.   Choose whole-grain foods to get more nutrients, including minerals and fiber. For example, choose whole-wheat bread, whole-grain  cereals, or brown rice.   Use fresh, frozen, or no-salt-added canned vegetables.   Remember to also reduce the salt and sodium in your diet. Try to have no more than 2000 milligrams (mg) of sodium per day, with a goal of further reducing the sodium to 1500 mg per day. Three important ways to reduce sodium are:   Eat food products with reduced-sodium or no salt added.   Use less salt when you prepare foods and do not add salt to your food at the table.   Read food labels. Aim for foods that contain less than 5% of the daily value of sodium.   The DASH eating plan is not designed for weight loss. But it contains many lower-calorie foods, such as fruits and vegetables. You can make it lower in calories by replacing high-calorie foods with more fruits and vegetables. Some ideas to increase fruits and vegetables and decrease calories include:   Eat a medium apple instead of 4 shortbread cookies. You'll save 80 calories.   Eat 1/4 cup of dried apricots instead of a 2-ounce bag of pork rinds. You'll save 230 calories.   Have a hamburger that's 3 ounces instead of 6 ounces. Add a 1/2 cup serving of carrots and a 1/2 cup serving of spinach. You'll save more than 200 calories.   Instead of 5 ounces of chicken, have a stir garnica with 2 ounces of chicken and 1 and 1/2 cups of raw vegetables. Use just a small amount of vegetable oil. You'll save 50 calories.   Have a 1/2 cup serving of low-fat frozen yogurt instead of a 1-and-1/2-ounce chocolate bar. You'll save about 110 calories.   Use low-fat or fat-free condiments, such as fat-free salad dressings.   Eat smaller portions. Cut back gradually.   Use food labels to compare fat and calorie content in packaged foods. Items marked low fat or fat free may be lower in fat but not lower in calories than their regular versions.   Limit foods with lots of added sugar, such as pies, flavored yogurts, candy bars, ice cream, sherbet, regular soft drinks, and fruit drinks.   Drink water or  club soda instead of cola or other soda drinks.     For more information, see the National Heart, Lung, and Blood [place] Web site at: http://www.nhlbi.nih.gov/health/public/heart/hbp/dash/.             Follow-ups after your visit        Additional Services     NEUROLOGY ADULT REFERRAL       Your provider has referred you for the following:   Consult at [facility]: [facility]rology  Foreign [phone]   [email or web address]    Please be aware that coverage of these services is subject to the terms and limitations of your health insurance plan.  Call member services at your health plan with any benefit or coverage questions.      Please bring the following with you to your appointment:    (1) Any X-Rays, CTs or MRIs which have been performed.  Contact the facility where they were done to arrange for  prior to your scheduled appointment.    (2) List of current medications  (3) This referral request   (4) Any documents/labs given to you for this referral                  Follow-up notes from your care team     Return in about 2 weeks (around 3/15/2018) for BP Recheck.      Your next 10 appointments already scheduled     Mar 14, 2018  9:00 AM CDT   Ech Stress Test with PHECHR2   [facility] Echocardiography ([facility])    [address] [place] [id number] [phone]           1. Please bring or wear a comfortable two-piece outfit and walking shoes. 2. Stop eating 3 hours before the test. You may drink water or juice. 3. Stop all caffeine 12 hours before the test. This includes coffee, tea, soda pop, chocolate and certain medicines (such as Anacin and Excederin). Also avoid decaf coffee and tea, as these contain small amounts of caffeine. 4. No alcohol, smoking or use of other tobacco products for 12 hours before the test. 5. Refer to your provider instructions to see if you need to stop any medications  "(such as beta-blockers or nitrates) for this test. 6. For patients with diabetes: - If you take insulin, call your diabetes care team. Ask if you should take a   dose the morning of your test. - If you take diabetes medicine by mouth, dont take it on the morning of your test. Bring it with you to take after the test. (If you have questions, call your diabetes care team) 7. When you arrive, please tell us if: - You have diabetes. - You have taken Viagra, Cialis or Levitra in the past 48 hours. 8. For any questions that cannot be answered, please contact the ordering physician              Future tests that were ordered for you today     Open Future Orders        Priority Expected Expires Ordered    Exercise Stress Echocardiogram Routine  3/1/2019 3/1/2018            Who to contact     If you have questions or need follow up information about today's clinic visit or your schedule please contact [facility] directly at [phone].  Normal or non-critical lab and imaging results will be communicated to you by [employer], letter or phone within 4 business days after the clinic has received the results. If you do not hear from us within 7 days, please contact the clinic through [employer] or phone. If you have a critical or abnormal lab result, we will notify you by phone as soon as possible.  Submit refill requests through [employer] or call your pharmacy and they will forward the refill request to us. Please allow 3 business days for your refill to be completed.          Additional Information About Your Visit        [employer] Information     [employer] lets you send messages to your doctor, view your test results, renew your prescriptions, schedule appointments and more. To sign up, go to [email or web address] . Click on \"Log in\" on the left side of the screen, which will take you to the Welcome page. Then click on \"Sign up Now\" on the right side of the page.     You will be asked to enter the access code listed " "below, as well as some personal information. Please follow the directions to create your username and password.     Your access code is: [email or web address]  Expires: 2018 11:46 AM     Your access code will  in 90 days. If you need help or a new code, please call your [facility] clinic or [phone].        Care EveryWhere ID     This is your Care EveryWhere ID. This could be used by other organizations to access your [facility] medical records  [id number]        Your Vitals Were     Pulse Temperature Respirations Height BMI (Body Mass Index)       94 98  F (36.7  C) (Temporal) 20 5' 2\" (1.575 m) 27.87 kg/m2        Blood Pressure from Last 3 Encounters:   18 (!) 140/94   01/15/18 (!) 168/110   10/14/17 (!) 147/98    Weight from Last 3 Encounters:   18 152 lb 6.4 oz (69.1 kg)   01/10/18 154 lb (69.9 kg)   10/14/17 155 lb (70.3 kg)              We Performed the Following     NEUROLOGY ADULT REFERRAL          Today's Medication Changes          These changes are accurate as of 3/1/18 11:53 AM.  If you have any questions, ask your nurse or doctor.               Start taking these medicines.        Dose/Directions    losartan-hydrochlorothiazide 50-12.5 MG per tablet   Commonly known as:  HYZAAR   Used for:  Intractable chronic migraine without aura and with status migrainosus, HTN, goal below 140/90   Started by:  Scott Harley PA-C        Dose:  1 tablet   Take 1 tablet by mouth daily   Quantity:  90 tablet   Refills:  1         These medicines have changed or have updated prescriptions.        Dose/Directions    diltiazem 240 MG 24 hr capsule   Commonly known as:  CARDIZEM CD   This may have changed:  Another medication with the same name was removed. Continue taking this medication, and follow the directions you see here.   Used for:  Essential hypertension with goal blood pressure less than 140/90   Changed by:  Scott Harley PA-C        Dose:  240 mg   Take 1 capsule (240 mg) by mouth daily "   Quantity:  30 capsule   Refills:  0         Stop taking these medicines if you haven't already. Please contact your care team if you have questions.     HYDROcodone-acetaminophen 5-325 MG per tablet   Commonly known as:  NORCO   Stopped by:  Scott Harley PA-C                Where to get your medicines      These medications were sent to [facility] [place], [place] - [address]   [address] [place] [address]     Phone:  [phone]     losartan-hydrochlorothiazide 50-12.5 MG per tablet    rizatriptan 5 MG tablet                Primary Care Provider Office Phone # Fax #    Lianet Gandara, APRN [employer] [phone] [address] [address] [place] [address]        Equal Access to Services     MARIBELL BRANHAM : Hadii edvin brown hadasho Soomaali, waaxda luqadaha, qaybta kaalmada adeegyada, henrik fulton hayjeannie frye . So [facility] [phone].    ATENCIÓN: Si habla español, tiene a moreira disposición servicios gratuitos de asistencia lingüística. Llame al [phone].    We comply with applicable federal civil rights laws and [place] laws. We do not discriminate on the basis of race, color, national origin, age, disability, sex, sexual orientation, or gender identity.            Thank you!     Thank you for choosing [facility]  for your care. Our goal is always to provide you with excellent care. Hearing back from our patients is one way we can continue to improve our services. Please take a few minutes to complete the written survey that you may receive in the mail after your visit with us. Thank you!             Your Updated Medication List - Protect others around you: Learn how to safely use, store and throw away your medicines at www.disposemymeds.org.          This list is accurate as of 3/1/18 11:53 AM.  Always use your most recent med list.                   Brand Name Dispense Instructions for use Diagnosis    buPROPion 150 MG 12 hr  tablet    WELLBUTRIN SR    180 tablet    Take 1 tablet (150 mg) by mouth 2 times daily    Major depressive disorder, recurrent episode, moderate (H), SILVIA (generalized anxiety disorder)       cyclobenzaprine 5 MG tablet    FLEXERIL    60 tablet    Take 1-2 tablets (5-10 mg) by mouth nightly as needed for muscle spasms (neck tension)    Acute non intractable tension-type headache       diltiazem 240 MG 24 hr capsule    CARDIZEM CD    30 capsule    Take 1 capsule (240 mg) by mouth daily    Essential hypertension with goal blood pressure less than 140/90       ibuprofen 600 MG tablet    ADVIL/MOTRIN    40 tablet    Take 1 tablet (600 mg) by mouth every 6 hours as needed for moderate pain        losartan-hydrochlorothiazide 50-12.5 MG per tablet    HYZAAR    90 tablet    Take 1 tablet by mouth daily    Intractable chronic migraine without aura and with status migrainosus, HTN, goal below 140/90       omeprazole 20 MG CR capsule    priLOSEC    90 capsule    Take 1 capsule (20 mg) by mouth daily    Gastroesophageal reflux disease without esophagitis       rizatriptan 5 MG tablet    MAXALT    18 tablet    Take 1-2 tablets (5-10 mg) by mouth at onset of headache for migraine May repeat in 2 hours. Max 6 tablets/24 hours.    Acute non intractable tension-type headache       traMADol 50 MG tablet    ULTRAM    20 tablet    Take 1 tablet (50 mg) by mouth every 6 hours as needed for pain maximum 6 tablet(s) per day

## 2018-03-02 ASSESSMENT — ANXIETY QUESTIONNAIRES: GAD7 TOTAL SCORE: 6

## 2018-03-02 ASSESSMENT — PATIENT HEALTH QUESTIONNAIRE - PHQ9: SUM OF ALL RESPONSES TO PHQ QUESTIONS 1-9: 7

## 2018-03-14 ENCOUNTER — HOSPITAL ENCOUNTER (OUTPATIENT)
Dept: CARDIOLOGY | Facility: CLINIC | Age: 48
Discharge: HOME | End: 2018-03-14
Attending: PHYSICIAN ASSISTANT | Admitting: PHYSICIAN ASSISTANT
Payer: COMMERCIAL

## 2018-03-14 DIAGNOSIS — Z82.49 FAMILY HISTORY OF ISCHEMIC HEART DISEASE: ICD-10-CM

## 2018-03-14 DIAGNOSIS — R07.89 ATYPICAL CHEST PAIN: ICD-10-CM

## 2018-03-14 DIAGNOSIS — I10 HTN, GOAL BELOW 140/90: ICD-10-CM

## 2018-03-14 PROCEDURE — 93321 DOPPLER ECHO F-UP/LMTD STD: CPT | Mod: 26 | Performed by: INTERNAL MEDICINE

## 2018-03-14 PROCEDURE — 93016 CV STRESS TEST SUPVJ ONLY: CPT | Performed by: INTERNAL MEDICINE

## 2018-03-14 PROCEDURE — 93018 CV STRESS TEST I&R ONLY: CPT | Performed by: INTERNAL MEDICINE

## 2018-03-14 PROCEDURE — 93350 STRESS TTE ONLY: CPT | Mod: 26 | Performed by: INTERNAL MEDICINE

## 2018-03-14 PROCEDURE — 93325 DOPPLER ECHO COLOR FLOW MAPG: CPT | Mod: 26 | Performed by: INTERNAL MEDICINE

## 2018-03-14 PROCEDURE — 93017 CV STRESS TEST TRACING ONLY: CPT | Performed by: REHABILITATION PRACTITIONER

## 2018-03-14 PROCEDURE — 93321 DOPPLER ECHO F-UP/LMTD STD: CPT | Mod: TC

## 2018-04-11 ENCOUNTER — TELEPHONE (OUTPATIENT)
Dept: FAMILY MEDICINE | Facility: OTHER | Age: 48
End: 2018-04-11

## 2018-04-11 NOTE — TELEPHONE ENCOUNTER
You placed a referral to [facility] on 3/1/18.    It is unclear if the patient has scheduled yet, not finding a report showing they were seen.     Please forward to your team if further follow up is needed to see if they have made this appointment.      Thank you!   Davina/Referral Representative for [facility]

## 2018-04-11 NOTE — LETTER
[facility]  [address] [place] [address]  Phone: [phone]        April 11, 2018      Maranda Figueroa                                                                                                                                [address] [facility] [address]            Dear Ms. Figueroa,    We are concerned about your health care.  We received a notice that you are to be scheduled with a specialty clinic. The referral has been placed by your provider and you can call to schedule an appointment directly.     Enclosed, you will find the referral with the phone number to call to schedule an appointment.  If you have already scheduled this, you may disregard this letter.    Please call us if you have any questions or concerns.      Thank you,      Your [facility] Team

## 2018-06-21 ENCOUNTER — TELEPHONE (OUTPATIENT)
Dept: FAMILY MEDICINE | Facility: OTHER | Age: 48
End: 2018-06-21

## 2018-06-21 NOTE — TELEPHONE ENCOUNTER
Summary:    Patient is due/failing the following:   BP CHECK and PAP    Action needed:   Patient needs office visit for PAP and BP check .    Type of outreach:    Phone, spoke to patient.  patient will call back she was at work    Questions for provider review:    None                                                                                                                                    Lidia Emilio       Chart routed to Care Team .      Panel Management Review      Patient has the following on her problem list:     Depression / Dysthymia review    Measure:  Needs PHQ-9 score of 4 or less during index window.  Administer PHQ-9 and if score is 5 or more, send encounter to provider for next steps.        PHQ-9 SCORE 7/7/2017 1/10/2018 3/1/2018   Total Score - - -   Total Score MyChart - 9 (Mild depression) 7 (Mild depression)   Total Score 9 9 7       If PHQ-9 recheck is 5 or more, route to provider for next steps.    Patient is due for:  PHQ9    Hypertension   Last three blood pressure readings:  BP Readings from Last 3 Encounters:   03/01/18 (!) 140/94   01/15/18 (!) 168/110   10/14/17 (!) 147/98     Blood pressure: FAILED    HTN Guidelines:  Age 18-59 BP range:  Less than 140/90  Age 60-85 with Diabetes:  Less than 140/90  Age 60-85 without Diabetes:  less than 150/90      Composite cancer screening  Chart review shows that this patient is due/due soon for the following PAP

## 2018-06-21 NOTE — LETTER
[facility]  [address] [place] [address]  Phone: [phone]  June 28, 2018      Maranda Figueroa  [address] [facility] [address]      Dear Maranda,    We care about your health and have reviewed your health plan including your medical conditions, medications, and lab results.  Based on this review, it is recommended that you follow up regarding the following health topic(s):  -High Blood Pressure  -Cervical Cancer Screening    We recommend you take the following action(s):  -schedule a FREE FLOAT MA-ONLY BLOOD PRESSURE APPOINTMENT within the next 1-4 weeks.  -schedule a PAP SMEAR EXAM which is due.  Please disregard this reminder if you have had this exam elsewhere within the last year.  It would be helpful for us to have a copy of your recent pap smear report to update your records.     Please call us at the [facility] - [phone] (or use [employer]) to address the above recommendations.     Thank you for trusting [facility] and we appreciate the opportunity to serve you.  We look forward to supporting your healthcare needs in the future.    Healthy Regards,    Your Health Care Team  [facility]

## 2018-07-09 NOTE — PROGRESS NOTES
"  SUBJECTIVE:   Maranda Figueroa is a 48 year old female who presents to clinic today for the following health issues:      HPI  Diarrhea  Onset: about 2 months     Description:   Consistency of stool: loose  Blood in stool: no   Number of loose stools in past 24 hours: can be up to 10 times a day but not always this many    Progression of Symptoms:  worsening and same    Accompanying Signs & Symptoms:  Fever: no   Nausea or vomiting; YES- sometimes feels \"really sick\" but no vomiting   Abdominal pain: YES- LUQ-:\"like a pinch\"  Episodes of constipation: no   Weight loss: YES not trying to lose but thinks she as lost a few pounds     History:   Ill contacts: no   Recent use of antibiotics: no    Recent travels: no          Recent medication-new or changes(Rx or OTC): no   Has had ulcers in the past and was told she \"has a large colon\"    Precipitating factors:   Seems like food just runs through her    Alleviating factors:   Nothing  Therapies Tried and outcome:  Nothing     She has has had loose stools for the past couple years but her symptoms have worsened recently. They did move to a different house a couple years ago and she thinks that the symptoms started sometime after they moved. They have well water and they had it tested at this time and there are no concerns. They do not drink the well water on a regular basis other than using it for coffee, cooking and bathing. She states her  has also had similar issues and is not sure if they started when they moved. She has not started any new medications or OTC supplements. No recent changes in diet. No know allergies to foods.     Problem list and histories reviewed & adjusted, as indicated.  Additional history: as documented    BP Readings from Last 3 Encounters:   07/10/18 122/88   03/01/18 (!) 140/94   01/15/18 (!) 168/110    Wt Readings from Last 3 Encounters:   07/10/18 150 lb (68 kg)   03/01/18 152 lb 6.4 oz (69.1 kg)   01/10/18 154 lb (69.9 kg)           "   Labs reviewed in EPIC    ROS:  Constitutional, HEENT, cardiovascular, pulmonary, GI, , musculoskeletal, neuro, skin, endocrine and psych systems are negative, except as otherwise noted.    OBJECTIVE:     /88  Pulse 86  Temp 98.4  F (36.9  C) (Temporal)  Resp 16  Wt 150 lb (68 kg)  BMI 27.44 kg/m2  Body mass index is 27.44 kg/(m^2).  GENERAL: alert and appears mildly uncomfortable  EYES: Eyes grossly normal to inspection, PERRL and conjunctivae and sclerae normal  HENT: ear canals and TM's normal, nose and mouth without ulcers or lesions  NECK: no adenopathy, no asymmetry, masses, or scars and thyroid normal to palpation  RESP: lungs clear to auscultation - no rales, rhonchi or wheezes  CV: regular rate and rhythm, normal S1 S2, no S3 or S4, no murmur, click or rub, no peripheral edema  ABDOMEN: soft, mildly tender over LUQ, otherwise nontender, no hepatosplenomegaly, no masses and bowel sounds normal  MS: no gross musculoskeletal defects noted, no edema  SKIN: no suspicious lesions or rashes  NEURO: Normal strength and tone, mentation intact and speech normal  PSYCH: mentation appears normal, affect normal/bright, mildly anxious    Diagnostic Test Results:  Results for orders placed or performed in visit on 07/10/18 (from the past 24 hour(s))   CBC with platelets   Result Value Ref Range    WBC 5.7 4.0 - 11.0 10e9/L    RBC Count 4.37 3.8 - 5.2 10e12/L    Hemoglobin 14.3 11.7 - 15.7 g/dL    Hematocrit 41.9 35.0 - 47.0 %    MCV 96 78 - 100 fl    MCH 32.7 26.5 - 33.0 pg    MCHC 34.1 31.5 - 36.5 g/dL    RDW 12.0 10.0 - 15.0 %    Platelet Count 194 150 - 450 10e9/L   Comprehensive metabolic panel (BMP + Alb, Alk Phos, ALT, AST, Total. Bili, TP)   Result Value Ref Range    Sodium 136 133 - 144 mmol/L    Potassium 4.4 3.4 - 5.3 mmol/L    Chloride 99 94 - 109 mmol/L    Carbon Dioxide 28 20 - 32 mmol/L    Anion Gap 9 3 - 14 mmol/L    Glucose 99 70 - 99 mg/dL    Urea Nitrogen 16 7 - 30 mg/dL    Creatinine 0.62  0.52 - 1.04 mg/dL    GFR Estimate >90 >60 mL/min/1.7m2    GFR Estimate If Black >90 >60 mL/min/1.7m2    Calcium 9.0 8.5 - 10.1 mg/dL    Bilirubin Total 0.7 0.2 - 1.3 mg/dL    Albumin 4.1 3.4 - 5.0 g/dL    Protein Total 7.4 6.8 - 8.8 g/dL    Alkaline Phosphatase 109 40 - 150 U/L    ALT 65 (H) 0 - 50 U/L    AST 56 (H) 0 - 45 U/L   TSH with free T4 reflex   Result Value Ref Range    TSH 0.97 0.40 - 4.00 mU/L       ASSESSMENT/PLAN:     1. Diarrhea, unspecified type  Stool samples negative. Liver enzymes mildly elevated. US shows fatty liver disease but does not explain symptoms. Recommend patient stop drinking the well water at their home for several weeks to see if symptoms improve since symptoms started after she moved to this home. Also recommend patient follow up with GI for consult.   - CBC with platelets  - Comprehensive metabolic panel (BMP + Alb, Alk Phos, ALT, AST, Total. Bili, TP)  - GASTROENTEROLOGY ADULT REF CONSULT ONLY  - Clostridium difficile Toxin B PCR; Future  - Fecal Lactoferrin; Future  - Enteric Bacteria and Virus Panel by DUANE Stool; Future  - US Abdomen Complete; Future  - TSH with free T4 reflex    2. LUQ abdominal pain  - CBC with platelets  - Comprehensive metabolic panel (BMP + Alb, Alk Phos, ALT, AST, Total. Bili, TP)  - GASTROENTEROLOGY ADULT REF CONSULT ONLY  - Clostridium difficile Toxin B PCR; Future  - Fecal Lactoferrin; Future  - Enteric Bacteria and Virus Panel by DUANE Stool; Future  - US Abdomen Complete; Future    3. Night sweats  - TSH with free T4 reflex    4. Elevated liver enzymes  - AST; Future  - ALT; Future    5. Screening for malignant neoplasm of cervix  - Pap imaged thin layer screen with HPV - recommended age 30 - 65 years (select HPV order below)  - HPV High Risk Types DNA Cervical    MANUEL Ramires [facility]  Answers for HPI/ROS submitted by the patient on 7/10/2018   If you checked off any problems, how difficult have these problems made  it for you to do your work, take care of things at home, or get along with other people?: Not difficult at all  PHQ9 TOTAL SCORE: 8  SILVIA 7 TOTAL SCORE: 6

## 2018-07-10 ENCOUNTER — OFFICE VISIT (OUTPATIENT)
Dept: FAMILY MEDICINE | Facility: OTHER | Age: 48
End: 2018-07-10
Payer: COMMERCIAL

## 2018-07-10 VITALS
HEART RATE: 86 BPM | RESPIRATION RATE: 16 BRPM | BODY MASS INDEX: 27.44 KG/M2 | TEMPERATURE: 98.4 F | SYSTOLIC BLOOD PRESSURE: 122 MMHG | WEIGHT: 150 LBS | DIASTOLIC BLOOD PRESSURE: 88 MMHG

## 2018-07-10 DIAGNOSIS — Z12.4 SCREENING FOR MALIGNANT NEOPLASM OF CERVIX: ICD-10-CM

## 2018-07-10 DIAGNOSIS — R61 NIGHT SWEATS: ICD-10-CM

## 2018-07-10 DIAGNOSIS — R19.7 DIARRHEA, UNSPECIFIED TYPE: Primary | ICD-10-CM

## 2018-07-10 DIAGNOSIS — R74.8 ELEVATED LIVER ENZYMES: ICD-10-CM

## 2018-07-10 DIAGNOSIS — R10.12 LUQ ABDOMINAL PAIN: ICD-10-CM

## 2018-07-10 LAB
ALBUMIN SERPL-MCNC: 4.1 G/DL (ref 3.4–5)
ALP SERPL-CCNC: 109 U/L (ref 40–150)
ALT SERPL W P-5'-P-CCNC: 65 U/L (ref 0–50)
ANION GAP SERPL CALCULATED.3IONS-SCNC: 9 MMOL/L (ref 3–14)
AST SERPL W P-5'-P-CCNC: 56 U/L (ref 0–45)
BILIRUB SERPL-MCNC: 0.7 MG/DL (ref 0.2–1.3)
BUN SERPL-MCNC: 16 MG/DL (ref 7–30)
CALCIUM SERPL-MCNC: 9 MG/DL (ref 8.5–10.1)
CHLORIDE SERPL-SCNC: 99 MMOL/L (ref 94–109)
CO2 SERPL-SCNC: 28 MMOL/L (ref 20–32)
CREAT SERPL-MCNC: 0.62 MG/DL (ref 0.52–1.04)
ERYTHROCYTE [DISTWIDTH] IN BLOOD BY AUTOMATED COUNT: 12 % (ref 10–15)
GFR SERPL CREATININE-BSD FRML MDRD: >90 ML/MIN/1.7M2
GLUCOSE SERPL-MCNC: 99 MG/DL (ref 70–99)
HCT VFR BLD AUTO: 41.9 % (ref 35–47)
HGB BLD-MCNC: 14.3 G/DL (ref 11.7–15.7)
MCH RBC QN AUTO: 32.7 PG (ref 26.5–33)
MCHC RBC AUTO-ENTMCNC: 34.1 G/DL (ref 31.5–36.5)
MCV RBC AUTO: 96 FL (ref 78–100)
PLATELET # BLD AUTO: 194 10E9/L (ref 150–450)
POTASSIUM SERPL-SCNC: 4.4 MMOL/L (ref 3.4–5.3)
PROT SERPL-MCNC: 7.4 G/DL (ref 6.8–8.8)
RBC # BLD AUTO: 4.37 10E12/L (ref 3.8–5.2)
SODIUM SERPL-SCNC: 136 MMOL/L (ref 133–144)
TSH SERPL DL<=0.005 MIU/L-ACNC: 0.97 MU/L (ref 0.4–4)
WBC # BLD AUTO: 5.7 10E9/L (ref 4–11)

## 2018-07-10 PROCEDURE — 85027 COMPLETE CBC AUTOMATED: CPT | Performed by: STUDENT IN AN ORGANIZED HEALTH CARE EDUCATION/TRAINING PROGRAM

## 2018-07-10 PROCEDURE — 99214 OFFICE O/P EST MOD 30 MIN: CPT | Performed by: STUDENT IN AN ORGANIZED HEALTH CARE EDUCATION/TRAINING PROGRAM

## 2018-07-10 PROCEDURE — 80053 COMPREHEN METABOLIC PANEL: CPT | Performed by: STUDENT IN AN ORGANIZED HEALTH CARE EDUCATION/TRAINING PROGRAM

## 2018-07-10 PROCEDURE — G0145 SCR C/V CYTO,THINLAYER,RESCR: HCPCS | Performed by: STUDENT IN AN ORGANIZED HEALTH CARE EDUCATION/TRAINING PROGRAM

## 2018-07-10 PROCEDURE — 36415 COLL VENOUS BLD VENIPUNCTURE: CPT | Performed by: STUDENT IN AN ORGANIZED HEALTH CARE EDUCATION/TRAINING PROGRAM

## 2018-07-10 PROCEDURE — 84443 ASSAY THYROID STIM HORMONE: CPT | Performed by: STUDENT IN AN ORGANIZED HEALTH CARE EDUCATION/TRAINING PROGRAM

## 2018-07-10 PROCEDURE — 87624 HPV HI-RISK TYP POOLED RSLT: CPT | Performed by: STUDENT IN AN ORGANIZED HEALTH CARE EDUCATION/TRAINING PROGRAM

## 2018-07-10 ASSESSMENT — PATIENT HEALTH QUESTIONNAIRE - PHQ9
10. IF YOU CHECKED OFF ANY PROBLEMS, HOW DIFFICULT HAVE THESE PROBLEMS MADE IT FOR YOU TO DO YOUR WORK, TAKE CARE OF THINGS AT HOME, OR GET ALONG WITH OTHER PEOPLE: NOT DIFFICULT AT ALL
SUM OF ALL RESPONSES TO PHQ QUESTIONS 1-9: 8
SUM OF ALL RESPONSES TO PHQ QUESTIONS 1-9: 8

## 2018-07-10 ASSESSMENT — ANXIETY QUESTIONNAIRES
1. FEELING NERVOUS, ANXIOUS, OR ON EDGE: SEVERAL DAYS
GAD7 TOTAL SCORE: 6
4. TROUBLE RELAXING: SEVERAL DAYS
7. FEELING AFRAID AS IF SOMETHING AWFUL MIGHT HAPPEN: SEVERAL DAYS
2. NOT BEING ABLE TO STOP OR CONTROL WORRYING: SEVERAL DAYS
5. BEING SO RESTLESS THAT IT IS HARD TO SIT STILL: NOT AT ALL
GAD7 TOTAL SCORE: 6
7. FEELING AFRAID AS IF SOMETHING AWFUL MIGHT HAPPEN: SEVERAL DAYS
GAD7 TOTAL SCORE: 6
3. WORRYING TOO MUCH ABOUT DIFFERENT THINGS: SEVERAL DAYS
6. BECOMING EASILY ANNOYED OR IRRITABLE: SEVERAL DAYS

## 2018-07-10 ASSESSMENT — PAIN SCALES - GENERAL: PAINLEVEL: NO PAIN (0)

## 2018-07-10 NOTE — PATIENT INSTRUCTIONS
some Lactaid pills and take these before eating dairy.    Do a trial of not using the water from the well at all.    Gastroenterology referral.    Stool samples - collect these at home.    Blood work today.    JERRY BhandariC

## 2018-07-10 NOTE — LETTER
July 20, 2018    Maranda Figueroa  [address] [facility] [address]    Dear Maranda,  We are happy to inform you that your PAP smear result from 7/10/18 is normal.  We are now able to do a follow up test on PAP smears. The DNA test is for HPV (Human Papilloma Virus). Cervical cancer is closely linked with certain types of HPV. Your results showed no evidence of high risk HPV.  Therefore we recommend you return in 3 years for your next pap smear.  You will still need to return to the clinic every year for an annual exam and other preventive tests.  Please contact the clinic at [phone] with any questions.  Sincerely,    Lianet Gandara, MANUEL CNP/rlm

## 2018-07-10 NOTE — MR AVS SNAPSHOT
After Visit Summary   7/10/2018    Maranda Figueroa    MRN: [id number]           Patient Information     Date Of Birth          1970        Visit Information        Provider Department      7/10/2018 8:00 AM Lianet Gandara APRN [facility]        Today's Diagnoses     Diarrhea, unspecified type    -  1    Screening for malignant neoplasm of cervix        Screening for HIV (human immunodeficiency virus)        LUQ abdominal pain        Night sweats          Care Instructions     some Lactaid pills and take these before eating dairy.    Do a trial of not using the water from the well at all.    Gastroenterology referral.    Stool samples - collect these at home.    Blood work today.    Lianet Gandara, NP-C            Follow-ups after your visit        Additional Services     GASTROENTEROLOGY ADULT REF CONSULT ONLY       Preferred Location: [facility], [facility]: [phone] and [place] [facility] [phone]      Please be aware that coverage of these services is subject to the terms and limitations of your health insurance plan.  Call member services at your health plan with any benefit or coverage questions.  Any procedures must be performed at a [place] facility OR coordinated by your clinic's referral office.    Please bring the following with you to your appointment:    (1) Any X-Rays, CTs or MRIs which have been performed.  Contact the facility where they were done to arrange for  prior to your scheduled appointment.    (2) List of current medications   (3) This referral request   (4) Any documents/labs given to you for this referral                  Future tests that were ordered for you today     Open Future Orders        Priority Expected Expires Ordered    Clostridium difficile Toxin B PCR Routine  8/9/2018 7/10/2018    Fecal Lactoferrin Routine  7/10/2019 7/10/2018    Enteric Bacteria and Virus Panel by DUANE Stool Routine  7/10/2019 7/10/2018     US Abdomen Complete Routine  7/10/2019 7/10/2018            Who to contact     If you have questions or need follow up information about today's clinic visit or your schedule please contact [facility] directly at [phone].  Normal or non-critical lab and imaging results will be communicated to you by MyChart, letter or phone within 4 business days after the clinic has received the results. If you do not hear from us within 7 days, please contact the clinic through MyChart or phone. If you have a critical or abnormal lab result, we will notify you by phone as soon as possible.  Submit refill requests through [employer] or call your pharmacy and they will forward the refill request to us. Please allow 3 business days for your refill to be completed.          Additional Information About Your Visit        Care EveryWhere ID     This is your Care EveryWhere ID. This could be used by other organizations to access your [facility] medical records  [id number]        Your Vitals Were     Pulse Temperature Respirations BMI (Body Mass Index)          86 98.4  F (36.9  C) (Temporal) 16 27.44 kg/m2         Blood Pressure from Last 3 Encounters:   07/10/18 122/88   03/01/18 (!) 140/94   01/15/18 (!) 168/110    Weight from Last 3 Encounters:   07/10/18 150 lb (68 kg)   03/01/18 152 lb 6.4 oz (69.1 kg)   01/10/18 154 lb (69.9 kg)              We Performed the Following     CBC with platelets     Comprehensive metabolic panel (BMP + Alb, Alk Phos, ALT, AST, Total. Bili, TP)     GASTROENTEROLOGY ADULT REF CONSULT ONLY     HPV High Risk Types DNA Cervical     Pap imaged thin layer screen with HPV - recommended age 30 - 65 years (select HPV order below)     TSH with free T4 reflex        Primary Care Provider Office Phone # Fax #    MANUEL [facility] [phone] [address] [address] [place] [address]        Equal Access to Services     MARIBELL BRANHAM AH: Rupal Blount,  waaxda luqadaha, qaybta kaalmada jayde, henrik guidoasim ah. So [facility] [place] [facility] [phone].    ATENCIÓN: Si parker cali, tiene a moreira disposición servicios gratuitos de asistencia lingüística. Tova al [phone].    We comply with applicable federal civil rights laws and [place] laws. We do not discriminate on the basis of race, color, national origin, age, disability, sex, sexual orientation, or gender identity.            Thank you!     Thank you for choosing [facility]  for your care. Our goal is always to provide you with excellent care. Hearing back from our patients is one way we can continue to improve our services. Please take a few minutes to complete the written survey that you may receive in the mail after your visit with us. Thank you!             Your Updated Medication List - Protect others around you: Learn how to safely use, store and throw away your medicines at www.disposemymeds.org.          This list is accurate as of 7/10/18  8:44 AM.  Always use your most recent med list.                   Brand Name Dispense Instructions for use Diagnosis    buPROPion 150 MG 12 hr tablet    WELLBUTRIN SR    180 tablet    Take 1 tablet (150 mg) by mouth 2 times daily    Major depressive disorder, recurrent episode, moderate (H), SILVIA (generalized anxiety disorder)       cyclobenzaprine 5 MG tablet    FLEXERIL    60 tablet    Take 1-2 tablets (5-10 mg) by mouth nightly as needed for muscle spasms (neck tension)    Acute non intractable tension-type headache       diltiazem 240 MG 24 hr capsule    CARDIZEM CD    30 capsule    Take 1 capsule (240 mg) by mouth daily    Essential hypertension with goal blood pressure less than 140/90       ibuprofen 600 MG tablet    ADVIL/MOTRIN    40 tablet    Take 1 tablet (600 mg) by mouth every 6 hours as needed for moderate pain        losartan-hydrochlorothiazide 50-12.5 MG per tablet    HYZAAR    90 tablet    Take 1 tablet by mouth daily     Intractable chronic migraine without aura and with status migrainosus, HTN, goal below 140/90       omeprazole 20 MG CR capsule    priLOSEC    90 capsule    Take 1 capsule (20 mg) by mouth daily    Gastroesophageal reflux disease without esophagitis       rizatriptan 5 MG tablet    MAXALT    18 tablet    Take 1-2 tablets (5-10 mg) by mouth at onset of headache for migraine May repeat in 2 hours. Max 6 tablets/24 hours.    Acute non intractable tension-type headache       traMADol 50 MG tablet    ULTRAM    20 tablet    Take 1 tablet (50 mg) by mouth every 6 hours as needed for pain maximum 6 tablet(s) per day

## 2018-07-11 ENCOUNTER — TELEPHONE (OUTPATIENT)
Dept: FAMILY MEDICINE | Facility: OTHER | Age: 48
End: 2018-07-11

## 2018-07-11 ASSESSMENT — PATIENT HEALTH QUESTIONNAIRE - PHQ9: SUM OF ALL RESPONSES TO PHQ QUESTIONS 1-9: 8

## 2018-07-11 ASSESSMENT — ANXIETY QUESTIONNAIRES: GAD7 TOTAL SCORE: 6

## 2018-07-11 NOTE — TELEPHONE ENCOUNTER
Informed pt qamar message below no further questions. She will call back to schedule lab appointment. Thank you.

## 2018-07-11 NOTE — TELEPHONE ENCOUNTER
Nadia Espinal RT (R) contacted Maranda on 07/11/18 and left a message. If patient calls back please inform patient of results below, thanks    Notes Recorded by Lianet Gandara, MANUEL CNP on 7/10/2018 at 8:25 PM  Please call patient and let her know her labs were for the most part normal. There was a mild elevation in a couple of her liver enzymes. This is likely not concerning as these can often have transient increases in the levels for a number of reasons. I recommend we recheck these levels in 2 weeks with a lab only visit. I recommend she avoid Tylenol and alcohol in the next couple weeks as both of these can elevate the levels.  Lianet Gandara, DENICE-C

## 2018-07-12 LAB
COPATH REPORT: NORMAL
PAP: NORMAL

## 2018-07-13 LAB
FINAL DIAGNOSIS: NORMAL
HPV HR 12 DNA CVX QL NAA+PROBE: NEGATIVE
HPV16 DNA SPEC QL NAA+PROBE: NEGATIVE
HPV18 DNA SPEC QL NAA+PROBE: NEGATIVE
SPECIMEN DESCRIPTION: NORMAL
SPECIMEN SOURCE CVX/VAG CYTO: NORMAL

## 2018-07-16 ENCOUNTER — HOSPITAL ENCOUNTER (OUTPATIENT)
Dept: ULTRASOUND IMAGING | Facility: CLINIC | Age: 48
Discharge: HOME | End: 2018-07-16
Attending: STUDENT IN AN ORGANIZED HEALTH CARE EDUCATION/TRAINING PROGRAM | Admitting: STUDENT IN AN ORGANIZED HEALTH CARE EDUCATION/TRAINING PROGRAM
Payer: COMMERCIAL

## 2018-07-16 DIAGNOSIS — R10.12 LUQ ABDOMINAL PAIN: ICD-10-CM

## 2018-07-16 DIAGNOSIS — R19.7 DIARRHEA, UNSPECIFIED TYPE: ICD-10-CM

## 2018-07-16 LAB
C COLI+JEJUNI+LARI FUSA STL QL NAA+PROBE: NOT DETECTED
C DIFF TOX B STL QL: NEGATIVE
EC STX1 GENE STL QL NAA+PROBE: NOT DETECTED
EC STX2 GENE STL QL NAA+PROBE: NOT DETECTED
ENTERIC PATHOGEN COMMENT: NORMAL
LACTOFERRIN STL QL IA: NEGATIVE
NOROV GI+II ORF1-ORF2 JNC STL QL NAA+PR: NOT DETECTED
RVA NSP5 STL QL NAA+PROBE: NOT DETECTED
SALMONELLA SP RPOD STL QL NAA+PROBE: NOT DETECTED
SHIGELLA SP+EIEC IPAH STL QL NAA+PROBE: NOT DETECTED
SPECIMEN SOURCE: NORMAL
V CHOL+PARA RFBL+TRKH+TNAA STL QL NAA+PR: NOT DETECTED
Y ENTERO RECN STL QL NAA+PROBE: NOT DETECTED

## 2018-07-16 PROCEDURE — 87506 IADNA-DNA/RNA PROBE TQ 6-11: CPT | Performed by: STUDENT IN AN ORGANIZED HEALTH CARE EDUCATION/TRAINING PROGRAM

## 2018-07-16 PROCEDURE — 83630 LACTOFERRIN FECAL (QUAL): CPT | Performed by: STUDENT IN AN ORGANIZED HEALTH CARE EDUCATION/TRAINING PROGRAM

## 2018-07-16 PROCEDURE — 87493 C DIFF AMPLIFIED PROBE: CPT | Mod: 59 | Performed by: STUDENT IN AN ORGANIZED HEALTH CARE EDUCATION/TRAINING PROGRAM

## 2018-07-16 PROCEDURE — 76700 US EXAM ABDOM COMPLETE: CPT

## 2018-07-20 ENCOUNTER — TELEPHONE (OUTPATIENT)
Dept: FAMILY MEDICINE | Facility: OTHER | Age: 48
End: 2018-07-20

## 2018-07-20 DIAGNOSIS — E78.2 MIXED HYPERLIPIDEMIA: Primary | ICD-10-CM

## 2018-07-20 NOTE — LETTER
July 20, 2018    Maranda Figueroa  [address]   [place] [address]    Dear ,    We are writing to inform you of your test results.    All of your stool samples were negative for infection. I would like you to see gastroenterology if your symptoms persist. I placed a referral and you can call to set up an appointment. Here are the phone numbers for you to call them - [facility], [facility]: [phone] or [facility] [phone]. Your abdominal ultrasound did not find a cause for her symptoms. You do have fatty liver disease which had been noted on a previous imaging study. Fatty liver is likely the cause of the mild elevation in your liver function tests. I recommend avoiding alcohol and Tylenol as these are processed by the liver. Also, to help reverse fatty liver disease, I recommend eating a diet with a lot of vegetables and high fiber as well as exercising 3-5 times per week and working on weight loss.  I would like you to have your fasting cholesterol levels done when you get your liver function rechecked as we may consider treating with cholesterol medication to also help prevent worsening of the fatty liver disease if your cholesterol is elevated.      Resulted Orders   Clostridium difficile Toxin B PCR   Result Value Ref Range    Specimen Description Feces     C Diff Toxin B PCR Negative NEG^Negative      Comment:      Negative: Clostridium difficile target DNA sequences NOT detected, presumed   negative for Clostridium difficile toxin B or the number of bacteria present   may be below the limit of detection for the test.  FDA approved assay performed using [employer] GeneXpert real-time PCR.  A negative result does not exclude actual disease due to Clostridium difficile   and may be due to improper collection, handling and storage of the specimen   or the number of organisms in the specimen is below the detection limit of the   assay.     Fecal Lactoferrin   Result Value Ref Range    Fecal  Lactoferrin Negative NEG^Negative      Comment:      Test may not be appropriate for immunocompromised patients.   Enteric Bacteria and Virus Panel by DUANE Stool   Result Value Ref Range    Campylobacter group by DUANE Not Detected NDET^Not Detected    Salmonella species by DUANE Not Detected NDET^Not Detected    Shigella species by DUANE Not Detected NDET^Not Detected    Vibrio group by DUANE Not Detected NDET^Not Detected    Rotavirus A by DUANE Not Detected NDET^Not Detected    Shiga toxin 1 gene by DUANE Not Detected NDET^Not Detected    Shiga toxin 2 gene by DUANE Not Detected NDET^Not Detected    Norovirus I and II by DUANE Not Detected NDET^Not Detected    Yersinia enterocolitica by DUANE Not Detected NDET^Not Detected    Enteric pathogen comment       Testing performed by multiplexed, qualitative PCR using the [employer] Enteric   Pathogens Nucleic Acid Test. Results should not be used as the sole basis for diagnosis,   treatment, or other patient management decisions.        Comment:      Positive results do not rule out co-infection with other organisms that are   not detected by this test, and may not be the sole or definitive cause of   patient illness.   Negative results in the setting of clinical illness compatible with   gastroenteritis may be due to infection by pathogens that are not detected by   this test or non-infectious causes such as ulcerative colitis, irritable bowel   syndrome, or Crohn's disease.   Note: Shiga toxin producing E. coli (STEC) typically harbor one or both genes   that encode for Shiga toxins 1 and 2.         If you have any questions or concerns, please call the clinic at the number listed above.       Sincerely,        MANUEL Ramires CNP

## 2018-07-20 NOTE — TELEPHONE ENCOUNTER
Notes Recorded by Lianet Gandara APRN CNP on 7/20/2018 at 10:39 AM  Please call patient and let her know all of her stool samples were negative for infection. I would like her to see gastroenterology if her symptoms persist. I placed a referral and she can call to set up an appointment. I placed the referral at her appointment. Here are the phone numbers if she needs them - [facility] [place], [facility]: [phone] and [facility] [phone].  Thanks,  Lianet Gandara CNP   Notes Recorded by Lianet Gandara APRN CNP on 7/20/2018 at 10:48 AM  See also stool sample result note.  Please let patient know that her abdominal ultrasound did not find a cause for her symptoms. She does have fatty liver disease which had been noted on a previous imaging study. Fatty liver is likely the cause of the mild elevation in her liver function tests. I recommend avoiding alcohol and Tylenol as these are processed by the liver. Also, to help reverse fatty liver disease, I recommend eating a diet with a lot of vegetables and high fiber and exercising 3-5 times per week and working on weight loss.  I would like her to have her fasting cholesterol levels done when she gets her liver function rechecked as we may consider treating with cholesterol medication to also help prevent worsening of fatty liver disease if her cholesterol is elevated.   Let me know if she has any questions.  AMY Bhandari

## 2018-07-20 NOTE — TELEPHONE ENCOUNTER
Pt informed and letter sent as well with numbers to schedule with GI.    Jacy Haywood CMA ([facility])

## 2018-08-03 DIAGNOSIS — I10 HTN, GOAL BELOW 140/90: ICD-10-CM

## 2018-08-03 DIAGNOSIS — G43.711 INTRACTABLE CHRONIC MIGRAINE WITHOUT AURA AND WITH STATUS MIGRAINOSUS: ICD-10-CM

## 2018-08-03 RX ORDER — LOSARTAN POTASSIUM AND HYDROCHLOROTHIAZIDE 12.5; 5 MG/1; MG/1
TABLET ORAL
Qty: 90 TABLET | Refills: 3 | Status: SHIPPED | OUTPATIENT
Start: 2018-08-03 | End: 2020-01-24

## 2018-08-03 NOTE — TELEPHONE ENCOUNTER
Hyzaar:  Prescription approved per [facility] Refill Protocol.    Lilia Brown, RN, BSN

## 2018-08-20 ENCOUNTER — TELEPHONE (OUTPATIENT)
Dept: FAMILY MEDICINE | Facility: OTHER | Age: 48
End: 2018-08-20

## 2018-08-20 NOTE — LETTER
[facility]  [address] [place] [facility] [address]  [phone]          August 22, 2018    Maranda Figueroa                                                                                                                     [address] [facility] [address]            Dear Maranda,    We received a notice that you are to be scheduled with a specialty clinic. The referral has been placed by your provider and you can call to schedule an appointment directly.     Enclosed, you will find the referral with the phone number to call to schedule an appointment.  If you have already scheduled this, you may disregard this letter.    Please call us if you have any questions or concerns.      Sincerely,         Lianet Butts CNP

## 2018-08-20 NOTE — TELEPHONE ENCOUNTER
You placed a referral to GI/multiple locations on 7/10/18.    It is unclear if the patient has scheduled yet, not finding a report showing they were seen.     Please forward to your team if further follow up is needed to see if they have made this appointment.      Thank you!   Davina/Referral Representative for [facility]

## 2018-08-22 NOTE — TELEPHONE ENCOUNTER
Multiple call attempts made, letter sent with referral   Closing encounter  Nadia Espinal RT (R)

## 2019-02-02 ENCOUNTER — APPOINTMENT (OUTPATIENT)
Dept: ULTRASOUND IMAGING | Facility: CLINIC | Age: 49
End: 2019-02-02
Payer: COMMERCIAL

## 2019-02-02 ENCOUNTER — HOSPITAL ENCOUNTER (EMERGENCY)
Facility: CLINIC | Age: 49
Discharge: HOME | End: 2019-02-02
Attending: FAMILY MEDICINE | Admitting: FAMILY MEDICINE
Payer: COMMERCIAL

## 2019-02-02 VITALS
BODY MASS INDEX: 26.13 KG/M2 | RESPIRATION RATE: 16 BRPM | DIASTOLIC BLOOD PRESSURE: 99 MMHG | SYSTOLIC BLOOD PRESSURE: 154 MMHG | OXYGEN SATURATION: 97 % | HEIGHT: 62 IN | TEMPERATURE: 97.6 F | WEIGHT: 142 LBS | HEART RATE: 74 BPM

## 2019-02-02 DIAGNOSIS — K80.50 BILIARY COLIC: ICD-10-CM

## 2019-02-02 DIAGNOSIS — Z87.11 HISTORY OF PEPTIC ULCER DISEASE: ICD-10-CM

## 2019-02-02 DIAGNOSIS — K52.9 CHRONIC DIARRHEA: ICD-10-CM

## 2019-02-02 DIAGNOSIS — R10.13 ABDOMINAL PAIN, EPIGASTRIC: ICD-10-CM

## 2019-02-02 LAB
ALBUMIN SERPL-MCNC: 4.1 G/DL (ref 3.4–5)
ALBUMIN UR-MCNC: 30 MG/DL
ALP SERPL-CCNC: 89 U/L (ref 40–150)
ALT SERPL W P-5'-P-CCNC: 57 U/L (ref 0–50)
ANION GAP SERPL CALCULATED.3IONS-SCNC: 9 MMOL/L (ref 3–14)
APPEARANCE UR: ABNORMAL
AST SERPL W P-5'-P-CCNC: 57 U/L (ref 0–45)
BACTERIA #/AREA URNS HPF: ABNORMAL /HPF
BASOPHILS # BLD AUTO: 0 10E9/L (ref 0–0.2)
BASOPHILS NFR BLD AUTO: 0.9 %
BILIRUB SERPL-MCNC: 1.5 MG/DL (ref 0.2–1.3)
BILIRUB UR QL STRIP: NEGATIVE
BUN SERPL-MCNC: 9 MG/DL (ref 7–30)
CALCIUM SERPL-MCNC: 8.9 MG/DL (ref 8.5–10.1)
CHLORIDE SERPL-SCNC: 103 MMOL/L (ref 94–109)
CO2 SERPL-SCNC: 26 MMOL/L (ref 20–32)
COLOR UR AUTO: ABNORMAL
CREAT SERPL-MCNC: 0.63 MG/DL (ref 0.52–1.04)
DIFFERENTIAL METHOD BLD: NORMAL
EOSINOPHIL NFR BLD AUTO: 1.1 %
ERYTHROCYTE [DISTWIDTH] IN BLOOD BY AUTOMATED COUNT: 12.2 % (ref 10–15)
GFR SERPL CREATININE-BSD FRML MDRD: >90 ML/MIN/{1.73_M2}
GLUCOSE SERPL-MCNC: 108 MG/DL (ref 70–99)
GLUCOSE UR STRIP-MCNC: NEGATIVE MG/DL
HCT VFR BLD AUTO: 41.4 % (ref 35–47)
HGB BLD-MCNC: 14.4 G/DL (ref 11.7–15.7)
HGB UR QL STRIP: NEGATIVE
IMM GRANULOCYTES # BLD: 0 10E9/L (ref 0–0.4)
IMM GRANULOCYTES NFR BLD: 0 %
KETONES UR STRIP-MCNC: 5 MG/DL
LEUKOCYTE ESTERASE UR QL STRIP: NEGATIVE
LYMPHOCYTES # BLD AUTO: 1.1 10E9/L (ref 0.8–5.3)
LYMPHOCYTES NFR BLD AUTO: 25.2 %
MCH RBC QN AUTO: 32.8 PG (ref 26.5–33)
MCHC RBC AUTO-ENTMCNC: 34.8 G/DL (ref 31.5–36.5)
MCV RBC AUTO: 94 FL (ref 78–100)
MONOCYTES # BLD AUTO: 0.5 10E9/L (ref 0–1.3)
MONOCYTES NFR BLD AUTO: 11.2 %
MUCOUS THREADS #/AREA URNS LPF: PRESENT /LPF
NEUTROPHILS # BLD AUTO: 2.7 10E9/L (ref 1.6–8.3)
NEUTROPHILS NFR BLD AUTO: 61.6 %
NITRATE UR QL: POSITIVE
NRBC # BLD AUTO: 0 10*3/UL
NRBC BLD AUTO-RTO: 0 /100
PH UR STRIP: 5 PH (ref 5–7)
PLATELET # BLD AUTO: 209 10E9/L (ref 150–450)
POTASSIUM SERPL-SCNC: 3.7 MMOL/L (ref 3.4–5.3)
PROT SERPL-MCNC: 7.2 G/DL (ref 6.8–8.8)
RBC # BLD AUTO: 4.39 10E12/L (ref 3.8–5.2)
RBC #/AREA URNS AUTO: 3 /HPF (ref 0–2)
SODIUM SERPL-SCNC: 138 MMOL/L (ref 133–144)
SOURCE: ABNORMAL
SP GR UR STRIP: 1.02 (ref 1–1.03)
SQUAMOUS #/AREA URNS AUTO: 14 /HPF (ref 0–1)
UROBILINOGEN UR STRIP-MCNC: 0 MG/DL (ref 0–2)
WBC # BLD AUTO: 4.4 10E9/L (ref 4–11)
WBC #/AREA URNS AUTO: 17 /HPF (ref 0–5)

## 2019-02-02 PROCEDURE — 76705 ECHO EXAM OF ABDOMEN: CPT

## 2019-02-02 PROCEDURE — 99285 EMERGENCY DEPT VISIT HI MDM: CPT | Mod: 25 | Performed by: FAMILY MEDICINE

## 2019-02-02 PROCEDURE — 25000128 H RX IP 250 OP 636: Performed by: FAMILY MEDICINE

## 2019-02-02 PROCEDURE — 96375 TX/PRO/DX INJ NEW DRUG ADDON: CPT | Performed by: FAMILY MEDICINE

## 2019-02-02 PROCEDURE — 96361 HYDRATE IV INFUSION ADD-ON: CPT | Performed by: FAMILY MEDICINE

## 2019-02-02 PROCEDURE — 81001 URINALYSIS AUTO W/SCOPE: CPT | Performed by: FAMILY MEDICINE

## 2019-02-02 PROCEDURE — 99284 EMERGENCY DEPT VISIT MOD MDM: CPT | Mod: Z6 | Performed by: FAMILY MEDICINE

## 2019-02-02 PROCEDURE — 80053 COMPREHEN METABOLIC PANEL: CPT | Performed by: FAMILY MEDICINE

## 2019-02-02 PROCEDURE — 85025 COMPLETE CBC W/AUTO DIFF WBC: CPT | Performed by: FAMILY MEDICINE

## 2019-02-02 PROCEDURE — 96374 THER/PROPH/DIAG INJ IV PUSH: CPT | Performed by: FAMILY MEDICINE

## 2019-02-02 RX ORDER — ONDANSETRON 2 MG/ML
4 INJECTION INTRAMUSCULAR; INTRAVENOUS EVERY 30 MIN PRN
Status: DISCONTINUED | OUTPATIENT
Start: 2019-02-02 | End: 2019-02-02 | Stop reason: HOSPADM

## 2019-02-02 RX ORDER — HYDROCODONE BITARTRATE AND ACETAMINOPHEN 5; 325 MG/1; MG/1
1 TABLET ORAL EVERY 6 HOURS PRN
Qty: 10 TABLET | Refills: 0 | Status: SHIPPED | OUTPATIENT
Start: 2019-02-02 | End: 2019-02-05

## 2019-02-02 RX ORDER — HYDROMORPHONE HYDROCHLORIDE 1 MG/ML
0.5 INJECTION, SOLUTION INTRAMUSCULAR; INTRAVENOUS; SUBCUTANEOUS
Status: DISCONTINUED | OUTPATIENT
Start: 2019-02-02 | End: 2019-02-02

## 2019-02-02 RX ORDER — KETOROLAC TROMETHAMINE 30 MG/ML
30 INJECTION, SOLUTION INTRAMUSCULAR; INTRAVENOUS ONCE
Status: COMPLETED | OUTPATIENT
Start: 2019-02-02 | End: 2019-02-02

## 2019-02-02 RX ADMIN — KETOROLAC TROMETHAMINE 30 MG: 30 INJECTION, SOLUTION INTRAMUSCULAR at 13:20

## 2019-02-02 RX ADMIN — ONDANSETRON 4 MG: 2 INJECTION INTRAMUSCULAR; INTRAVENOUS at 13:12

## 2019-02-02 RX ADMIN — SODIUM CHLORIDE 1000 ML: 9 INJECTION, SOLUTION INTRAVENOUS at 13:12

## 2019-02-02 ASSESSMENT — ENCOUNTER SYMPTOMS
WEAKNESS: 0
POLYDIPSIA: 0
DIARRHEA: 1
LIGHT-HEADEDNESS: 0
FREQUENCY: 1
SHORTNESS OF BREATH: 0
ABDOMINAL PAIN: 1
VOMITING: 1
BACK PAIN: 0
CONFUSION: 0
DIZZINESS: 0
EYE REDNESS: 0
NAUSEA: 1
SORE THROAT: 0
DYSURIA: 0
APPETITE CHANGE: 1
COUGH: 0
FEVER: 0

## 2019-02-02 ASSESSMENT — MIFFLIN-ST. JEOR: SCORE: 1237.36

## 2019-02-02 NOTE — ED AVS SNAPSHOT
[facility]  [address] [place] [address]  Phone:  [phone]  Fax:  [phone]                                    Maranda Figueroa   MRN: [id number]    Department:  [facility]   Date of Visit:  2/2/2019           After Visit Summary Signature Page    I have received my discharge instructions, and my questions have been answered. I have discussed any challenges I see with this plan with the nurse or doctor.    ..........................................................................................................................................  Patient/Patient Representative Signature      ..........................................................................................................................................  Patient Representative Print Name and Relationship to Patient    ..................................................               ................................................  Date                                   Time    ..........................................................................................................................................  Reviewed by Signature/Title    ...................................................              ..............................................  Date                                               Time          22EPIC Rev 08/18

## 2019-02-02 NOTE — ED PROVIDER NOTES
"  History     Chief Complaint   Patient presents with     Abdominal Pain     HPI  Maranda Figueroa is a 48 year old female who presents to the emergency room with ongoing abdominal pain.  She has had this for the last few days but in hindsight has had some symptoms for weeks and may be months.  The pain always flares up after eating.  She describes it as a sharp intense pain in the epigastric and right upper quadrant.  Some radiation into the back.  Always associated with nausea and occasional vomiting.  She is also been having frequent diarrhea but she has had this for many months may be the past year.  She has a history of ulcers and is currently on omeprazole.  Ulcers were seen on an endoscopy 3 years ago and she has had no follow-up.  She was on NSAIDs and stressed at that time.  She takes no NSAIDs any longer.  She has no known history of gallbladder disease and is unaware of her mother or sisters.  She denies feeling feverish.  She feels very weak and dehydrated today.  The patient notes diarrhea whenever she eats.  \"It goes right through her\".  She will have liquid watery stools until it is yellow water.  She has never seen blood or any melena.  If she is having pain usually diarrhea improves the pain.  She has never had a colonoscopy.    Allergies:  Allergies   Allergen Reactions     Penicillins      Causes nausea with emesis       Problem List:    Patient Active Problem List    Diagnosis Date Noted     Intractable chronic migraine without aura and with status migrainosus 03/01/2018     Priority: Medium     Gastroesophageal reflux disease without esophagitis 04/20/2016     Priority: Medium     Panic attack 04/20/2016     Priority: Medium     Vitamin D deficiency 02/10/2016     Priority: Medium     HTN, goal below 140/90 10/07/2015     Priority: Medium     Major depression in complete remission (H) 11/19/2013     Priority: Medium     Hearing impairment 11/19/2013     Priority: Medium     Left ear       Insomnia " 2013     Priority: Medium     Anxiety state 2006     Priority: Medium     Problem list name updated by automated process. Provider to review          Past Medical History:    Past Medical History:   Diagnosis Date     Anxiety state, unspecified      Depressive disorder, not elsewhere classified      Unspecified conductive hearing loss      Vitamin D deficiency 2/10/2016       Past Surgical History:    Past Surgical History:   Procedure Laterality Date     ESOPHAGOSCOPY, GASTROSCOPY, DUODENOSCOPY (EGD), COMBINED N/A 10/5/2015    Procedure: COMBINED ESOPHAGOSCOPY, GASTROSCOPY, DUODENOSCOPY (EGD), BIOPSY SINGLE OR MULTIPLE;  Surgeon: Calin Kenyon MD;  Location: [facility]     OPEN REDUCTION INTERNAL FIXATION RODDING INTRAMEDULLARY TIBIA  2011    Procedure:OPEN REDUCTION INTERNAL FIXATION RODDING INTRAMEDULLARY TIBIA; Open Reduction internal fixation rodding right tibia; Surgeon:JUNO TUTTLE; Location:[facility]     TYMPANOPLASTY, RT/LT      Tympanoplasty/LT       Family History:    Family History   Problem Relation Age of Onset     Diabetes Mother      Hypertension Mother      Thyroid Disease Mother      Cancer - colorectal Mother      Diabetes Father      Hypertension Father      Heart Disease Maternal Grandmother      Diabetes Maternal Grandfather        Social History:  Marital Status:   [2]  Social History     Tobacco Use     Smoking status: Former Smoker     Last attempt to quit: 1994     Years since quittin.6     Smokeless tobacco: Never Used   Substance Use Topics     Alcohol use: Yes     Alcohol/week: 1.0 oz     Comment: Occ.     Drug use: No        Medications:      HYDROcodone-acetaminophen (NORCO) 5-325 MG tablet   buPROPion (WELLBUTRIN SR) 150 MG 12 hr tablet   cyclobenzaprine (FLEXERIL) 5 MG tablet   diltiazem (CARDIZEM CD) 240 MG 24 hr capsule   ibuprofen (ADVIL/MOTRIN) 600 MG tablet   losartan-hydrochlorothiazide (HYZAAR) 50-12.5 MG per tablet   omeprazole (PRILOSEC)  "20 MG CR capsule   rizatriptan (MAXALT) 5 MG tablet   traMADol (ULTRAM) 50 MG tablet         Review of Systems   Constitutional: Positive for appetite change. Negative for fever.   HENT: Negative for congestion and sore throat.    Eyes: Negative for redness and visual disturbance.   Respiratory: Negative for cough and shortness of breath.    Cardiovascular: Negative for chest pain.   Gastrointestinal: Positive for abdominal pain, diarrhea, nausea and vomiting.   Endocrine: Negative for polydipsia and polyuria.   Genitourinary: Positive for frequency. Negative for dysuria.   Musculoskeletal: Negative for back pain.   Skin: Negative for rash.   Neurological: Negative for dizziness, weakness and light-headedness.   Psychiatric/Behavioral: Negative for confusion.   All other systems reviewed and are negative.      Physical Exam   BP: (!) 154/109  Pulse: 86  Temp: 97.6  F (36.4  C)  Resp: 16  Height: 157.5 cm (5' 2\")  Weight: 64.4 kg (142 lb)  SpO2: 97 %      Physical Exam   Constitutional: She is oriented to person, place, and time. She appears well-developed and well-nourished. No distress.   HENT:   Head: Normocephalic and atraumatic.   Right Ear: External ear normal.   Left Ear: External ear normal.   Nose: Nose normal.   Mouth/Throat: Oropharynx is clear and moist.   Eyes: Conjunctivae and EOM are normal. Pupils are equal, round, and reactive to light.   Neck: Normal range of motion. Neck supple.   Cardiovascular: Normal rate, regular rhythm, normal heart sounds and intact distal pulses.   Pulmonary/Chest: Effort normal and breath sounds normal.   Abdominal: Soft. Bowel sounds are normal. She exhibits no distension and no mass. There is no hepatosplenomegaly. There is tenderness in the right upper quadrant and epigastric area. No hernia.       Musculoskeletal: Normal range of motion.   Neurological: She is alert and oriented to person, place, and time.   Skin: Skin is warm and dry. Capillary refill takes less than 2 " seconds.   Psychiatric: She has a normal mood and affect.   Nursing note and vitals reviewed.      ED Course     Results for orders placed or performed during the hospital encounter of 02/02/19   US Abdomen Limited (RUQ)    Narrative    ULTRASOUND ABDOMEN LIMITED   2/2/2019 1:58 PM     HISTORY:  Right upper quadrant pain, post prandial.    COMPARISON: Ultrasound abdomen 7/16/2018.    FINDINGS:    Gallbladder: Normal with no cholelithiasis, wall thickening or focal  tenderness.      Bile ducts:  CHD is normal diameter.  No intrahepatic biliary  dilatation.    Liver: Fatty liver.    Pancreas:  Partially obscured but grossly unremarkable.     Right kidney:  Normal.     Aorta and IVC:  Not specifically assessed.        Impression    IMPRESSION:  No acute abnormality is seen. Unremarkable gallbladder.  Fatty liver.   CBC with platelets differential   Result Value Ref Range    WBC 4.4 4.0 - 11.0 10e9/L    RBC Count 4.39 3.8 - 5.2 10e12/L    Hemoglobin 14.4 11.7 - 15.7 g/dL    Hematocrit 41.4 35.0 - 47.0 %    MCV 94 78 - 100 fl    MCH 32.8 26.5 - 33.0 pg    MCHC 34.8 31.5 - 36.5 g/dL    RDW 12.2 10.0 - 15.0 %    Platelet Count 209 150 - 450 10e9/L    Diff Method Automated Method     % Neutrophils 61.6 %    % Lymphocytes 25.2 %    % Monocytes 11.2 %    % Eosinophils 1.1 %    % Basophils 0.9 %    % Immature Granulocytes 0.0 %    Nucleated RBCs 0 0 /100    Absolute Neutrophil 2.7 1.6 - 8.3 10e9/L    Absolute Lymphocytes 1.1 0.8 - 5.3 10e9/L    Absolute Monocytes 0.5 0.0 - 1.3 10e9/L    Absolute Basophils 0.0 0.0 - 0.2 10e9/L    Abs Immature Granulocytes 0.0 0 - 0.4 10e9/L    Absolute Nucleated RBC 0.0    Comprehensive metabolic panel   Result Value Ref Range    Sodium 138 133 - 144 mmol/L    Potassium 3.7 3.4 - 5.3 mmol/L    Chloride 103 94 - 109 mmol/L    Carbon Dioxide 26 20 - 32 mmol/L    Anion Gap 9 3 - 14 mmol/L    Glucose 108 (H) 70 - 99 mg/dL    Urea Nitrogen 9 7 - 30 mg/dL    Creatinine 0.63 0.52 - 1.04 mg/dL    GFR  Estimate >90 >60 mL/min/[1.73_m2]    GFR Estimate If Black >90 >60 mL/min/[1.73_m2]    Calcium 8.9 8.5 - 10.1 mg/dL    Bilirubin Total 1.5 (H) 0.2 - 1.3 mg/dL    Albumin 4.1 3.4 - 5.0 g/dL    Protein Total 7.2 6.8 - 8.8 g/dL    Alkaline Phosphatase 89 40 - 150 U/L    ALT 57 (H) 0 - 50 U/L    AST 57 (H) 0 - 45 U/L   UA with Microscopic   Result Value Ref Range    Color Urine Laura     Appearance Urine Slightly Cloudy     Glucose Urine Negative NEG^Negative mg/dL    Bilirubin Urine Negative NEG^Negative    Ketones Urine 5 (A) NEG^Negative mg/dL    Specific Gravity Urine 1.023 1.003 - 1.035    Blood Urine Negative NEG^Negative    pH Urine 5.0 5.0 - 7.0 pH    Protein Albumin Urine 30 (A) NEG^Negative mg/dL    Urobilinogen mg/dL 0.0 0.0 - 2.0 mg/dL    Nitrite Urine Positive (A) NEG^Negative    Leukocyte Esterase Urine Negative NEG^Negative    Source Midstream Urine     WBC Urine 17 (H) 0 - 5 /HPF    RBC Urine 3 (H) 0 - 2 /HPF    Bacteria Urine Moderate (A) NEG^Negative /HPF    Squamous Epithelial /HPF Urine 14 (H) 0 - 1 /HPF    Mucous Urine Present (A) NEG^Negative /LPF          Procedures               Critical Care time:  none                   Medications   ondansetron (ZOFRAN) injection 4 mg (4 mg Intravenous Given 2/2/19 1312)   0.9% sodium chloride BOLUS (0 mLs Intravenous Stopped 2/2/19 1430)   ketorolac (TORADOL) injection 30 mg (30 mg Intravenous Given 2/2/19 1320)       Assessments & Plan (with Medical Decision Making)   MDM--48-year-old female who presents with right upper quadrant and epigastric pain.  She is having postprandial symptoms now for weeks to months.  She usually has crampy abdominal pain across the epigastrium after eating and shortly after this watery diarrhea.  She has had this for maybe 1 year but is only lost 10 pounds.  There is never been any evidence of blood.  The type of food does not matter.  She does not feel feverish, has a normal temp here and a normal white count.  She is a little  tender to all 4 quadrants but probably worse in the right upper quadrant.  She has no peritoneal signs of rebound or guarding.  Her liver enzymes are just slightly elevated and she admits to drinking alcohol a couple nights ago.  Fatty liver was noted on her ultrasound.  Ultrasound however shows no stones or inflammation of the gallbladder or obstruction of any of the ducts.  I discussed all these findings with the patient.  I suspect her pain may be related to some type of colitis that she has and its ongoing.  She has had an endoscopy but never had a colonoscopy and I recommend that she either have this or a HIDA scan.  I do however recommend she follow-up with surgery so they can differentiate her symptoms and would be the ones to do a colonoscopy or a cholecystectomy if needed.  Patient is comfortable with this evaluation treatment and discharge plan and she is discharged in improved condition.  She was given 2 L of fluid Zofran and Toradol with complete resolution of her pain.    Also note her urine appears to be a dirty specimen.  She is having no frequency or dysuria so I did not place her on any antibiotics and instead sent her urine for culture.  I think antibiotics may confuse the picture further.      I have reviewed the nursing notes.    I have reviewed the findings, diagnosis, plan and need for follow up with the patient.             Medication List      Started    HYDROcodone-acetaminophen 5-325 MG tablet  Commonly known as:  NORCO  1 tablet, Oral, EVERY 6 HOURS PRN            Final diagnoses:   Abdominal pain, epigastric   Chronic diarrhea   Biliary colic   History of peptic ulcer disease       2/2/2019   [facility] EMERGENCY DEPARTMENT     Lilli, Franklyn BYERS MD  02/02/19 [id number]

## 2019-02-02 NOTE — ED TRIAGE NOTES
States two days ago started having abd pain. Hx stomach ulcers. Pain starts after eating. Yest and today pain under right ant rib area, sometimes under left ant rib area and across mid abd. Was having emesis on Thursday and diarrhea too. No blood in stools or emesis

## 2020-01-21 NOTE — PROGRESS NOTES
SUBJECTIVE:   CC: Maranda Figueroa is an 49 year old woman who presents for preventive health visit.     Healthy Habits:     Getting at least 3 servings of Calcium per day:  Yes    Bi-annual eye exam:  NO    Dental care twice a year:  NO    Sleep apnea or symptoms of sleep apnea:  None    Diet:  Regular (no restrictions)    Frequency of exercise:  None    Taking medications regularly:  Yes    Medication side effects:  None    PHQ-2 Total Score: 2    Additional concerns today:  No    Today's PHQ-2 Score:   PHQ-2 (  [employer]) 3/1/2018   Q1: Little interest or pleasure in doing things 1   Q2: Feeling down, depressed or hopeless 1   PHQ-2 Score 2   Q1: Little interest or pleasure in doing things Several days   Q2: Feeling down, depressed or hopeless Several days   PHQ-2 Score 2       Abuse: Current or Past(Physical, Sexual or Emotional)- No  Do you feel safe in your environment? Yes        Social History     Tobacco Use     Smoking status: Former Smoker     Last attempt to quit: 1994     Years since quittin.6     Smokeless tobacco: Never Used   Substance Use Topics     Alcohol use: Yes     Alcohol/week: 1.7 standard drinks     Comment: Occ.       Alcohol Use 2013   Prescreen: >3 drinks/day or >7 drinks/week? The patient does not drink >3 drinks per day nor >7 drinks per week.     Reviewed orders with patient.  Reviewed health maintenance and updated orders accordingly - Yes  Lab work is in process  Labs reviewed in EPIC  BP Readings from Last 3 Encounters:   20 (!) 164/126   19 (!) 154/99   07/10/18 122/88    Wt Readings from Last 3 Encounters:   20 60.5 kg (133 lb 6.4 oz)   19 64.4 kg (142 lb)   07/10/18 68 kg (150 lb)                  Patient Active Problem List   Diagnosis     Anxiety state     Major depression in complete remission (H)     Hearing impairment     Insomnia     HTN, goal below 140/90     Vitamin D deficiency     Gastroesophageal reflux disease without  esophagitis     Panic attack     Intractable chronic migraine without aura and with status migrainosus     Past Surgical History:   Procedure Laterality Date     ESOPHAGOSCOPY, GASTROSCOPY, DUODENOSCOPY (EGD), COMBINED N/A 10/5/2015    Procedure: COMBINED ESOPHAGOSCOPY, GASTROSCOPY, DUODENOSCOPY (EGD), BIOPSY SINGLE OR MULTIPLE;  Surgeon: Calin Kenyon MD;  Location: [facility]     OPEN REDUCTION INTERNAL FIXATION RODDING INTRAMEDULLARY TIBIA  2011    Procedure:OPEN REDUCTION INTERNAL FIXATION RODDING INTRAMEDULLARY TIBIA; Open Reduction internal fixation rodding right tibia; Surgeon:JUNO TUTTLE; Location:[facility]     TYMPANOPLASTY, RT/LT      Tympanoplasty/LT       Social History     Tobacco Use     Smoking status: Former Smoker     Last attempt to quit: 1994     Years since quittin.6     Smokeless tobacco: Never Used   Substance Use Topics     Alcohol use: Yes     Alcohol/week: 1.7 standard drinks     Comment: Occ.     Family History   Problem Relation Age of Onset     Diabetes Mother      Hypertension Mother      Thyroid Disease Mother      Cancer - colorectal Mother      Diabetes Father      Hypertension Father      Heart Disease Maternal Grandmother      Diabetes Maternal Grandfather          Current Outpatient Medications   Medication Sig Dispense Refill     buPROPion (WELLBUTRIN SR) 150 MG 12 hr tablet Take 1 tablet (150 mg) by mouth 2 times daily 180 tablet 1     diltiazem ER COATED BEADS (CARDIZEM CD) 240 MG 24 hr capsule Take 1 capsule (240 mg) by mouth daily 30 capsule 0     ibuprofen (ADVIL/MOTRIN) 600 MG tablet Take 1 tablet (600 mg) by mouth every 6 hours as needed for moderate pain 40 tablet 0     losartan-hydrochlorothiazide (HYZAAR) 50-12.5 MG tablet Take 1 tablet by mouth daily 30 tablet 0     omeprazole (PRILOSEC) 20 MG DR capsule Take 1 capsule (20 mg) by mouth daily 90 capsule 0     cyclobenzaprine (FLEXERIL) 5 MG tablet Take 1-2 tablets (5-10 mg) by mouth nightly as needed  for muscle spasms (neck tension) (Patient not taking: Reported on 1/24/2020) 60 tablet 1     rizatriptan (MAXALT) 5 MG tablet Take 1-2 tablets (5-10 mg) by mouth at onset of headache for migraine May repeat in 2 hours. Max 6 tablets/24 hours. (Patient not taking: Reported on 1/24/2020) 18 tablet 6     traMADol (ULTRAM) 50 MG tablet Take 1 tablet (50 mg) by mouth every 6 hours as needed for pain maximum 6 tablet(s) per day (Patient not taking: Reported on 1/24/2020) 20 tablet 0     Allergies   Allergen Reactions     Penicillins      Causes nausea with emesis     Recent Labs   Lab Test 02/02/19  1314 07/10/18  0900 10/14/17  0505  10/17/16  0902  11/19/13  0951   LDL  --   --   --   --   --   --  136*   HDL  --   --   --   --   --   --  78   TRIG  --   --   --   --   --   --  107   ALT 57* 65* 18  --   --    < >  --    CR 0.63 0.62 0.64   < >  --    < >  --    GFRESTIMATED >90 >90 >90   < >  --    < >  --    GFRESTBLACK >90 >90 >90   < >  --    < >  --    POTASSIUM 3.7 4.4 3.8   < >  --    < >  --    TSH  --  0.97  --   --  1.63   < >  --     < > = values in this interval not displayed.        Mammogram Screening: Patient under age 50, mutual decision reflected in health maintenance.      Pertinent mammograms are reviewed under the imaging tab.  History of abnormal Pap smear:   NO - age 30-65 PAP every 5 years with negative HPV co-testing recommended  Last 3 Pap and HPV Results:   PAP / HPV Latest Ref Rng & Units 7/10/2018 11/19/2013   PAP - NIL OTHER-NIL, See Result   HPV 16 DNA NEG:Negative Negative -   HPV 18 DNA NEG:Negative Negative -   OTHER HR HPV NEG:Negative Negative -     PAP / HPV Latest Ref Rng & Units 7/10/2018 11/19/2013   PAP - NIL OTHER-NIL, See Result   HPV 16 DNA NEG:Negative Negative -   HPV 18 DNA NEG:Negative Negative -   OTHER HR HPV NEG:Negative Negative -     Reviewed and updated as needed this visit by clinical staff         Reviewed and updated as needed this visit by Provider        Past  Medical History:   Diagnosis Date     Anxiety state, unspecified      Depressive disorder, not elsewhere classified      Unspecified conductive hearing loss     deaf in left ear     Vitamin D deficiency 2/10/2016      Past Surgical History:   Procedure Laterality Date     ESOPHAGOSCOPY, GASTROSCOPY, DUODENOSCOPY (EGD), COMBINED N/A 10/5/2015    Procedure: COMBINED ESOPHAGOSCOPY, GASTROSCOPY, DUODENOSCOPY (EGD), BIOPSY SINGLE OR MULTIPLE;  Surgeon: Calin Kenyon MD;  Location: [facility]     OPEN REDUCTION INTERNAL FIXATION RODDING INTRAMEDULLARY TIBIA  11/6/2011    Procedure:OPEN REDUCTION INTERNAL FIXATION RODDING INTRAMEDULLARY TIBIA; Open Reduction internal fixation rodding right tibia; Surgeon:JUNO TUTTLE; Location:[facility]     TYMPANOPLASTY, RT/LT      Tympanoplasty/LT       Review of Systems   Constitutional: Negative for chills and fever.   HENT: Positive for hearing loss. Negative for congestion, ear pain and sore throat.    Eyes: Positive for pain and visual disturbance.   Respiratory: Negative for cough and shortness of breath.    Cardiovascular: Negative for chest pain, palpitations and peripheral edema.   Gastrointestinal: Positive for abdominal pain, diarrhea, heartburn and nausea. Negative for constipation and hematochezia.   Breasts:  Negative for tenderness, breast mass and discharge.   Genitourinary: Positive for vaginal bleeding. Negative for dysuria, frequency, genital sores, hematuria, pelvic pain, urgency and vaginal discharge.   Musculoskeletal: Positive for arthralgias. Negative for joint swelling.   Skin: Negative for rash.   Neurological: Positive for dizziness, weakness and headaches. Negative for paresthesias.   Psychiatric/Behavioral: Negative for mood changes. The patient is nervous/anxious.      CONSTITUTIONAL: NEGATIVE for fever, chills, change in weight  INTEGUMENTARU/SKIN: NEGATIVE for worrisome rashes, moles or lesions  EYES: NEGATIVE for vision changes or irritation  ENT:  NEGATIVE for ear, mouth and throat problems  RESP: NEGATIVE for significant cough or SOB  BREAST: NEGATIVE for masses, tenderness or discharge  CV: NEGATIVE for chest pain, palpitations or peripheral edema  GI: NEGATIVE for nausea, abdominal pain, heartburn, or change in bowel habits  : NEGATIVE for unusual urinary or vaginal symptoms. Periods are regular.  MUSCULOSKELETAL: NEGATIVE for significant arthralgias or myalgia  NEURO: NEGATIVE for weakness, dizziness or paresthesias  PSYCHIATRIC: NEGATIVE for changes in mood or affect     OBJECTIVE:   There were no vitals taken for this visit.  Physical Exam  GENERAL APPEARANCE: healthy, alert and no distress  EYES: Eyes grossly normal to inspection, PERRL and conjunctivae and sclerae normal  HENT: ear canals and TM's normal, nose and mouth without ulcers or lesions, oropharynx clear and oral mucous membranes moist  NECK: no adenopathy, no asymmetry, masses, or scars and thyroid normal to palpation  RESP: lungs clear to auscultation - no rales, rhonchi or wheezes  CV: regular rate and rhythm, normal S1 S2, no S3 or S4, no murmur, click or rub, no peripheral edema and peripheral pulses strong  ABDOMEN: soft, nontender, no hepatosplenomegaly, no masses and bowel sounds normal  MS: no musculoskeletal defects are noted and gait is age appropriate without ataxia  SKIN: no suspicious lesions or rashes  NEURO: Normal strength and tone, sensory exam grossly normal, mentation intact and speech normal  PSYCH: mentation appears normal and affect normal/bright    Diagnostic Test Results:  Labs reviewed in Epic  No results found for this or any previous visit (from the past 24 hour(s)).    ASSESSMENT/PLAN:   1. Routine general medical examination at a health care facility  - Lipid panel reflex to direct LDL Fasting  - Comprehensive metabolic panel  - CBC with platelets  - TSH with free T4 reflex  - *MA Screening Digital Bilateral; Future    2. Essential hypertension with goal blood  pressure less than 140/90  She has not been taking her medications as directed and we refilled them so that she could begin doing so again.  She needs to be back in here in 2 weeks for a blood pressure check with primary care provider of her choosing.  - Lipid panel reflex to direct LDL Fasting  - diltiazem ER COATED BEADS (CARDIZEM CD) 240 MG 24 hr capsule; Take 1 capsule (240 mg) by mouth daily  Dispense: 30 capsule; Refill: 0  - Comprehensive metabolic panel  - CBC with platelets  - TSH with free T4 reflex    3. Intractable chronic migraine without aura and with status migrainosus    - Lipid panel reflex to direct LDL Fasting  - losartan-hydrochlorothiazide (HYZAAR) 50-12.5 MG tablet; Take 1 tablet by mouth daily  Dispense: 30 tablet; Refill: 0  - Comprehensive metabolic panel  - CBC with platelets  - TSH with free T4 reflex    4. HTN, goal below 140/90  Goal discussed medications refilled.  Follow-up in 2 weeks.  - Lipid panel reflex to direct LDL Fasting  - losartan-hydrochlorothiazide (HYZAAR) 50-12.5 MG tablet; Take 1 tablet by mouth daily  Dispense: 30 tablet; Refill: 0  - Comprehensive metabolic panel  - CBC with platelets  - TSH with free T4 reflex    5. Major depression in complete remission (H)  6. Anxiety state  7. Major depressive disorder, recurrent episode, moderate (H)  8. SILVIA (generalized anxiety disorder)  Ongoing cares.  She states that she has lots of stress in her life.  Further evaluation in 2 weeks when hopefully her blood pressure will be more under control.  Hopefully some of the concerns that she has related to her review of systems return more to normal as well.  - Lipid panel reflex to direct LDL Fasting  - Comprehensive metabolic panel  - CBC with platelets  - TSH with free T4 reflex  - buPROPion (WELLBUTRIN SR) 150 MG 12 hr tablet; Take 1 tablet (150 mg) by mouth 2 times daily  Dispense: 180 tablet; Refill: 1    9. Gastroesophageal reflux disease without esophagitis  Refilled  "medication as requested follow-up in 3 months.  - omeprazole (PRILOSEC) 20 MG DR capsule; Take 1 capsule (20 mg) by mouth daily  Dispense: 90 capsule; Refill: 0    COUNSELING:  Reviewed preventive health counseling, as reflected in patient instructions       Regular exercise       Healthy diet/nutrition       Vision screening       Hearing screening    Estimated body mass index is 25.97 kg/m  as calculated from the following:    Height as of 2/2/19: 1.575 m (5' 2\").    Weight as of 2/2/19: 64.4 kg (142 lb).    Weight management plan: Patient was referred to their PCP to discuss a diet and exercise plan.     reports that she quit smoking about 25 years ago. She has never used smokeless tobacco.      Counseling Resources:  ATP IV Guidelines  Pooled Cohorts Equation Calculator  Breast Cancer Risk Calculator  FRAX Risk Assessment  ICSI Preventive Guidelines  Dietary Guidelines for Americans, 2010  [employer]'s MyPlate  ASA Prophylaxis  Lung CA Screening    Scott Christie PA-C  [facility]  Answers for HPI/ROS submitted by the patient on 1/24/2020   If you checked off any problems, how difficult have these problems made it for you to do your work, take care of things at home, or get along with other people?: Somewhat difficult  PHQ9 TOTAL SCORE: 9  SILVIA 7 TOTAL SCORE: 10    "

## 2020-01-24 ENCOUNTER — TELEPHONE (OUTPATIENT)
Dept: FAMILY MEDICINE | Facility: OTHER | Age: 50
End: 2020-01-24

## 2020-01-24 ENCOUNTER — OFFICE VISIT (OUTPATIENT)
Dept: FAMILY MEDICINE | Facility: OTHER | Age: 50
End: 2020-01-24
Payer: COMMERCIAL

## 2020-01-24 VITALS
HEIGHT: 61 IN | DIASTOLIC BLOOD PRESSURE: 126 MMHG | SYSTOLIC BLOOD PRESSURE: 164 MMHG | RESPIRATION RATE: 16 BRPM | TEMPERATURE: 98.2 F | BODY MASS INDEX: 25.19 KG/M2 | WEIGHT: 133.4 LBS | HEART RATE: 88 BPM

## 2020-01-24 DIAGNOSIS — F41.1 GAD (GENERALIZED ANXIETY DISORDER): ICD-10-CM

## 2020-01-24 DIAGNOSIS — F33.1 MAJOR DEPRESSIVE DISORDER, RECURRENT EPISODE, MODERATE (H): ICD-10-CM

## 2020-01-24 DIAGNOSIS — I10 ESSENTIAL HYPERTENSION WITH GOAL BLOOD PRESSURE LESS THAN 140/90: ICD-10-CM

## 2020-01-24 DIAGNOSIS — F41.1 ANXIETY STATE: ICD-10-CM

## 2020-01-24 DIAGNOSIS — F32.5 MAJOR DEPRESSION IN COMPLETE REMISSION (H): ICD-10-CM

## 2020-01-24 DIAGNOSIS — Z00.00 ROUTINE GENERAL MEDICAL EXAMINATION AT A HEALTH CARE FACILITY: Primary | ICD-10-CM

## 2020-01-24 DIAGNOSIS — I10 HTN, GOAL BELOW 140/90: ICD-10-CM

## 2020-01-24 DIAGNOSIS — G43.711 INTRACTABLE CHRONIC MIGRAINE WITHOUT AURA AND WITH STATUS MIGRAINOSUS: ICD-10-CM

## 2020-01-24 DIAGNOSIS — K21.9 GASTROESOPHAGEAL REFLUX DISEASE WITHOUT ESOPHAGITIS: ICD-10-CM

## 2020-01-24 LAB
ALBUMIN SERPL-MCNC: 3.8 G/DL (ref 3.4–5)
ALP SERPL-CCNC: 77 U/L (ref 40–150)
ALT SERPL W P-5'-P-CCNC: 35 U/L (ref 0–50)
ANION GAP SERPL CALCULATED.3IONS-SCNC: 6 MMOL/L (ref 3–14)
AST SERPL W P-5'-P-CCNC: 54 U/L (ref 0–45)
BILIRUB SERPL-MCNC: 0.6 MG/DL (ref 0.2–1.3)
BUN SERPL-MCNC: 7 MG/DL (ref 7–30)
CALCIUM SERPL-MCNC: 8.4 MG/DL (ref 8.5–10.1)
CHLORIDE SERPL-SCNC: 108 MMOL/L (ref 94–109)
CHOLEST SERPL-MCNC: 219 MG/DL
CO2 SERPL-SCNC: 25 MMOL/L (ref 20–32)
CREAT SERPL-MCNC: 0.54 MG/DL (ref 0.52–1.04)
ERYTHROCYTE [DISTWIDTH] IN BLOOD BY AUTOMATED COUNT: 13.1 % (ref 10–15)
GFR SERPL CREATININE-BSD FRML MDRD: >90 ML/MIN/{1.73_M2}
GLUCOSE SERPL-MCNC: 111 MG/DL (ref 70–99)
HCT VFR BLD AUTO: 41.1 % (ref 35–47)
HDLC SERPL-MCNC: 81 MG/DL
HGB BLD-MCNC: 13.7 G/DL (ref 11.7–15.7)
LDLC SERPL CALC-MCNC: ABNORMAL MG/DL
LDLC SERPL DIRECT ASSAY-MCNC: 92 MG/DL
MCH RBC QN AUTO: 33.4 PG (ref 26.5–33)
MCHC RBC AUTO-ENTMCNC: 33.3 G/DL (ref 31.5–36.5)
MCV RBC AUTO: 100 FL (ref 78–100)
NONHDLC SERPL-MCNC: 138 MG/DL
PLATELET # BLD AUTO: 227 10E9/L (ref 150–450)
POTASSIUM SERPL-SCNC: 4.1 MMOL/L (ref 3.4–5.3)
PROT SERPL-MCNC: 7.1 G/DL (ref 6.8–8.8)
RBC # BLD AUTO: 4.1 10E12/L (ref 3.8–5.2)
SODIUM SERPL-SCNC: 139 MMOL/L (ref 133–144)
TRIGL SERPL-MCNC: 418 MG/DL
TSH SERPL DL<=0.005 MIU/L-ACNC: 1.05 MU/L (ref 0.4–4)
WBC # BLD AUTO: 4.4 10E9/L (ref 4–11)

## 2020-01-24 PROCEDURE — 99214 OFFICE O/P EST MOD 30 MIN: CPT | Mod: 25 | Performed by: PHYSICIAN ASSISTANT

## 2020-01-24 PROCEDURE — 85027 COMPLETE CBC AUTOMATED: CPT | Performed by: PHYSICIAN ASSISTANT

## 2020-01-24 PROCEDURE — 80061 LIPID PANEL: CPT | Performed by: PHYSICIAN ASSISTANT

## 2020-01-24 PROCEDURE — 99396 PREV VISIT EST AGE 40-64: CPT | Performed by: PHYSICIAN ASSISTANT

## 2020-01-24 PROCEDURE — 83721 ASSAY OF BLOOD LIPOPROTEIN: CPT | Mod: 59 | Performed by: PHYSICIAN ASSISTANT

## 2020-01-24 PROCEDURE — 36415 COLL VENOUS BLD VENIPUNCTURE: CPT | Performed by: PHYSICIAN ASSISTANT

## 2020-01-24 PROCEDURE — 96127 BRIEF EMOTIONAL/BEHAV ASSMT: CPT | Performed by: PHYSICIAN ASSISTANT

## 2020-01-24 PROCEDURE — 80053 COMPREHEN METABOLIC PANEL: CPT | Performed by: PHYSICIAN ASSISTANT

## 2020-01-24 PROCEDURE — 84443 ASSAY THYROID STIM HORMONE: CPT | Performed by: PHYSICIAN ASSISTANT

## 2020-01-24 RX ORDER — DILTIAZEM HYDROCHLORIDE 240 MG/1
240 CAPSULE, COATED, EXTENDED RELEASE ORAL DAILY
Qty: 30 CAPSULE | Refills: 0 | Status: SHIPPED | OUTPATIENT
Start: 2020-01-24 | End: 2021-08-06

## 2020-01-24 RX ORDER — LOSARTAN POTASSIUM AND HYDROCHLOROTHIAZIDE 12.5; 5 MG/1; MG/1
1 TABLET ORAL DAILY
Qty: 30 TABLET | Refills: 0 | Status: SHIPPED | OUTPATIENT
Start: 2020-01-24 | End: 2020-02-10

## 2020-01-24 RX ORDER — BUPROPION HYDROCHLORIDE 150 MG/1
150 TABLET, EXTENDED RELEASE ORAL 2 TIMES DAILY
Qty: 180 TABLET | Refills: 1 | Status: SHIPPED | OUTPATIENT
Start: 2020-01-24 | End: 2021-08-20

## 2020-01-24 ASSESSMENT — ENCOUNTER SYMPTOMS
NAUSEA: 1
DIARRHEA: 1
FEVER: 0
DYSURIA: 0
BREAST MASS: 0
WEAKNESS: 1
CHILLS: 0
DIZZINESS: 1
PALPITATIONS: 0
SHORTNESS OF BREATH: 0
PARESTHESIAS: 0
HEARTBURN: 1
ARTHRALGIAS: 1
CONSTIPATION: 0
COUGH: 0
HEMATURIA: 0
HEMATOCHEZIA: 0
HEADACHES: 1
EYE PAIN: 1
ABDOMINAL PAIN: 1
JOINT SWELLING: 0
FREQUENCY: 0
SORE THROAT: 0
NERVOUS/ANXIOUS: 1

## 2020-01-24 ASSESSMENT — ANXIETY QUESTIONNAIRES
GAD7 TOTAL SCORE: 10
6. BECOMING EASILY ANNOYED OR IRRITABLE: SEVERAL DAYS
GAD7 TOTAL SCORE: 10
3. WORRYING TOO MUCH ABOUT DIFFERENT THINGS: MORE THAN HALF THE DAYS
7. FEELING AFRAID AS IF SOMETHING AWFUL MIGHT HAPPEN: SEVERAL DAYS
GAD7 TOTAL SCORE: 10
7. FEELING AFRAID AS IF SOMETHING AWFUL MIGHT HAPPEN: SEVERAL DAYS
2. NOT BEING ABLE TO STOP OR CONTROL WORRYING: MORE THAN HALF THE DAYS
4. TROUBLE RELAXING: SEVERAL DAYS
1. FEELING NERVOUS, ANXIOUS, OR ON EDGE: MORE THAN HALF THE DAYS
5. BEING SO RESTLESS THAT IT IS HARD TO SIT STILL: SEVERAL DAYS

## 2020-01-24 ASSESSMENT — PAIN SCALES - GENERAL: PAINLEVEL: NO PAIN (0)

## 2020-01-24 ASSESSMENT — PATIENT HEALTH QUESTIONNAIRE - PHQ9
10. IF YOU CHECKED OFF ANY PROBLEMS, HOW DIFFICULT HAVE THESE PROBLEMS MADE IT FOR YOU TO DO YOUR WORK, TAKE CARE OF THINGS AT HOME, OR GET ALONG WITH OTHER PEOPLE: SOMEWHAT DIFFICULT
SUM OF ALL RESPONSES TO PHQ QUESTIONS 1-9: 9
SUM OF ALL RESPONSES TO PHQ QUESTIONS 1-9: 9

## 2020-01-24 ASSESSMENT — MIFFLIN-ST. JEOR: SCORE: 1184.09

## 2020-01-24 NOTE — LETTER
January 24, 2020      Maranda Henry  [address] [place] [facility] [place] [facility] [address]        Dear ,    We are writing to inform you of your test results.    Your LDL cholesterol and thyroid screen are within normal limits.   Your total cholesterol screen shows some elevation in triglycerides.  Usually this is related to long chain fatty acids and diet intake.    Your electrolytes show some slight abnormalities with improved liver function.  I would encourage you to get her blood pressure under control and to address diet and exercise changes to help with cholesterol.  Would also have her take a very careful look at any supplements or alcohol that she might be taking into her diet or habits and make some changes in that direction to minimize any liver function problems and minimize triglycerides as well.        If you have any questions or concerns, please call the clinic at the number listed above.       Sincerely,        Scott Christie PA-C

## 2020-01-24 NOTE — TELEPHONE ENCOUNTER
Spoke to patient to gather more information. Provider refilled medication that patient states she was taking. Patient states she was given losartan, hydrochlorothiazide, and diltiazem in separate bottles. Let patient know that losartan and hydrochlorothiazide should be 1 pill. Upon further reading on the bottle, patient states that the pharmacy did give her directions her that lisinopril-hydrochlorothiazide was not available as a combo and pharmacy gave patient directions on how to take losartan and hydrochlorothiazide. Instructed patient to call if she starts to notice any symptoms. Patient verbalized understanding, no further questions.

## 2020-01-24 NOTE — TELEPHONE ENCOUNTER
Reason for Call:  Medication or medication refill:    Do you use a [facility]?  Name of the pharmacy and phone number for the current request:  [facility] - [phone]    Name of the medication requested: Cardizem losartan hydrochlorothiazide    Other request: patient is wondering what she is supposed to take these for, and if they are ok to take.    Can we leave a detailed message on this number? YES    Phone number patient can be reached at: Home number on file [phone] (home)    Best Time: any    Call taken on 1/24/2020 at 1:15 PM by Patti Armijo

## 2020-01-24 NOTE — TELEPHONE ENCOUNTER
Per provider letter written.  Cristy Harvey CMA ([facility])    Notes recorded by Scott Harley PA-C on 1/24/2020 at 2:53 PM CST  Her LDL cholesterol and thyroid screen are within normal limits.   Her total cholesterol screen shows some elevation in triglycerides.  Usually this is related to long chain fatty acids and diet intake.  Her electrolytes show some slight abnormalities with improved liver function.  I would encourage her to get her blood pressure under control and to address diet and exercise changes to help with cholesterol.  Would also have her take a very careful look at any supplements or alcohol that she might be taking into her diet or habits and make some changes in that direction to minimize any liver function problems and minimize triglycerides as well.  Please, send normal letter with a copy of results and any advice listed.  Electronically signed:    Scott Harley PA-C

## 2020-01-25 ASSESSMENT — ANXIETY QUESTIONNAIRES: GAD7 TOTAL SCORE: 10

## 2020-01-25 ASSESSMENT — PATIENT HEALTH QUESTIONNAIRE - PHQ9: SUM OF ALL RESPONSES TO PHQ QUESTIONS 1-9: 9

## 2020-02-05 NOTE — PROGRESS NOTES
Subjective     Maranda Figueroa is a 49 year old female who presents to clinic today for the following health issues:    HPI   Hypertension Follow-up      Do you check your blood pressure regularly outside of the clinic? Yes     Are you following a low salt diet? Yes    Are your blood pressures ever more than 140 on the top number (systolic) OR more   than 90 on the bottom number (diastolic), for example 140/90? Yes      How many servings of fruits and vegetables do you eat daily?  0-1    On average, how many sweetened beverages do you drink each day (Examples: soda, juice, sweet tea, etc.  Do NOT count diet or artificially sweetened beverages)?   2    How many days per week do you exercise enough to make your heart beat faster? 3 or less    How many minutes a day do you exercise enough to make your heart beat faster? 9 or less    How many days per week do you miss taking your medication? 0    Patient Active Problem List   Diagnosis     Anxiety state     Major depression in complete remission (H)     Hearing impairment     Insomnia     HTN, goal below 140/90     Vitamin D deficiency     Gastroesophageal reflux disease without esophagitis     Panic attack     Intractable chronic migraine without aura and with status migrainosus     Past Surgical History:   Procedure Laterality Date     ESOPHAGOSCOPY, GASTROSCOPY, DUODENOSCOPY (EGD), COMBINED N/A 10/5/2015    Procedure: COMBINED ESOPHAGOSCOPY, GASTROSCOPY, DUODENOSCOPY (EGD), BIOPSY SINGLE OR MULTIPLE;  Surgeon: Calin Kenyon MD;  Location: [facility]     OPEN REDUCTION INTERNAL FIXATION RODDING INTRAMEDULLARY TIBIA  11/6/2011    Procedure:OPEN REDUCTION INTERNAL FIXATION RODDING INTRAMEDULLARY TIBIA; Open Reduction internal fixation rodding right tibia; Surgeon:JUNO TUTTLE; Location:[facility]     TYMPANOPLASTY, RT/LT      Tympanoplasty/LT       Social History     Tobacco Use     Smoking status: Former Smoker     Last attempt to quit: 6/1/1994     Years since  quittin.7     Smokeless tobacco: Never Used   Substance Use Topics     Alcohol use: Yes     Alcohol/week: 1.7 standard drinks     Comment: Occ.     Family History   Problem Relation Age of Onset     Diabetes Mother      Hypertension Mother      Thyroid Disease Mother      Cancer - colorectal Mother      Diabetes Father      Hypertension Father      Heart Disease Maternal Grandmother      Diabetes Maternal Grandfather          Current Outpatient Medications   Medication Sig Dispense Refill     buPROPion (WELLBUTRIN SR) 150 MG 12 hr tablet Take 1 tablet (150 mg) by mouth 2 times daily 180 tablet 1     cyclobenzaprine (FLEXERIL) 5 MG tablet Take 1-2 tablets (5-10 mg) by mouth nightly as needed for muscle spasms (neck tension) 60 tablet 1     diltiazem ER COATED BEADS (CARDIZEM CD) 240 MG 24 hr capsule Take 1 capsule (240 mg) by mouth daily 30 capsule 0     hydrochlorothiazide (HYDRODIURIL) 25 MG tablet Take 1 tablet (25 mg) by mouth daily 90 tablet 1     ibuprofen (ADVIL/MOTRIN) 600 MG tablet Take 1 tablet (600 mg) by mouth every 6 hours as needed for moderate pain 40 tablet 0     losartan (COZAAR) 100 MG tablet Take 1 tablet (100 mg) by mouth daily 90 tablet 1     omeprazole (PRILOSEC) 20 MG DR capsule Take 1 capsule (20 mg) by mouth daily 90 capsule 0     rizatriptan (MAXALT) 5 MG tablet Take 1-2 tablets (5-10 mg) by mouth at onset of headache for migraine May repeat in 2 hours. Max 6 tablets/24 hours. 18 tablet 6     traMADol (ULTRAM) 50 MG tablet Take 1 tablet (50 mg) by mouth every 6 hours as needed for pain maximum 6 tablet(s) per day (Patient not taking: Reported on 2020) 20 tablet 0     Allergies   Allergen Reactions     Penicillins      Causes nausea with emesis     Recent Labs   Lab Test 20  1131 19  1314 07/10/18  0900  13  0951   LDL Cannot estimate LDL when triglyceride exceeds 400 mg/dL  92  --   --   --  136*   HDL 81  --   --   --  78   TRIG 418*  --   --   --  107   ALT 35  "57* 65*   < >  --    CR 0.54 0.63 0.62   < >  --    GFRESTIMATED >90 >90 >90   < >  --    GFRESTBLACK >90 >90 >90   < >  --    POTASSIUM 4.1 3.7 4.4   < >  --    TSH 1.05  --  0.97   < >  --     < > = values in this interval not displayed.      BP Readings from Last 3 Encounters:   02/10/20 (!) 170/102   01/24/20 (!) 164/126   02/02/19 (!) 154/99    Wt Readings from Last 3 Encounters:   02/10/20 58.7 kg (129 lb 6.4 oz)   01/24/20 60.5 kg (133 lb 6.4 oz)   02/02/19 64.4 kg (142 lb)                    Reviewed and updated as needed this visit by Provider         Review of Systems   ROS COMP: Constitutional, HEENT, cardiovascular, pulmonary, GI, , musculoskeletal, neuro, skin, endocrine and psych systems are negative, except as otherwise noted.      Objective    BP (!) 170/102   Pulse 72   Temp 97.8  F (36.6  C) (Temporal)   Resp 16   Ht 1.552 m (5' 1.12\")   Wt 58.7 kg (129 lb 6.4 oz)   LMP 01/26/2020 (Exact Date)   SpO2 99%   BMI 24.35 kg/m    Body mass index is 24.35 kg/m .  Physical Exam   GENERAL: healthy, alert and no distress  NECK: no adenopathy, no asymmetry, masses, or scars and thyroid normal to palpation  RESP: lungs clear to auscultation - no rales, rhonchi or wheezes  CV: regular rate and rhythm, normal S1 S2, no S3 or S4, no murmur, click or rub, no peripheral edema and peripheral pulses strong  ABDOMEN: soft, nontender, no hepatosplenomegaly, no masses and bowel sounds normal  MS: no gross musculoskeletal defects noted, no edema    Diagnostic Test Results:  Labs reviewed in Epic  No results found for this or any previous visit (from the past 24 hour(s)).        Assessment & Plan     1. Acute non intractable tension-type headache  Increased meds and recheck in 2-3 weeks  - rizatriptan (MAXALT) 5 MG tablet; Take 1-2 tablets (5-10 mg) by mouth at onset of headache for migraine May repeat in 2 hours. Max 6 tablets/24 hours.  Dispense: 18 tablet; Refill: 6  - losartan (COZAAR) 100 MG tablet; Take 1 " tablet (100 mg) by mouth daily  Dispense: 90 tablet; Refill: 1  - hydrochlorothiazide (HYDRODIURIL) 25 MG tablet; Take 1 tablet (25 mg) by mouth daily  Dispense: 90 tablet; Refill: 1    2. HTN, goal below 140/90  - losartan (COZAAR) 100 MG tablet; Take 1 tablet (100 mg) by mouth daily  Dispense: 90 tablet; Refill: 1  - hydrochlorothiazide (HYDRODIURIL) 25 MG tablet; Take 1 tablet (25 mg) by mouth daily  Dispense: 90 tablet; Refill: 1    3. Intractable chronic migraine without aura and with status migrainosus  - losartan (COZAAR) 100 MG tablet; Take 1 tablet (100 mg) by mouth daily  Dispense: 90 tablet; Refill: 1  - hydrochlorothiazide (HYDRODIURIL) 25 MG tablet; Take 1 tablet (25 mg) by mouth daily  Dispense: 90 tablet; Refill: 1     Return in about 3 weeks (around 3/2/2020) for recheck of current condition, if symptoms do not improve.    Scott Christie PA-C  [facility]

## 2020-02-10 ENCOUNTER — OFFICE VISIT (OUTPATIENT)
Dept: FAMILY MEDICINE | Facility: OTHER | Age: 50
End: 2020-02-10
Payer: COMMERCIAL

## 2020-02-10 VITALS
BODY MASS INDEX: 24.43 KG/M2 | WEIGHT: 129.4 LBS | TEMPERATURE: 97.8 F | HEART RATE: 72 BPM | RESPIRATION RATE: 16 BRPM | SYSTOLIC BLOOD PRESSURE: 170 MMHG | HEIGHT: 61 IN | DIASTOLIC BLOOD PRESSURE: 102 MMHG | OXYGEN SATURATION: 99 %

## 2020-02-10 DIAGNOSIS — I10 HTN, GOAL BELOW 140/90: Primary | ICD-10-CM

## 2020-02-10 DIAGNOSIS — G44.209 ACUTE NON INTRACTABLE TENSION-TYPE HEADACHE: ICD-10-CM

## 2020-02-10 DIAGNOSIS — G43.711 INTRACTABLE CHRONIC MIGRAINE WITHOUT AURA AND WITH STATUS MIGRAINOSUS: ICD-10-CM

## 2020-02-10 PROCEDURE — 99213 OFFICE O/P EST LOW 20 MIN: CPT | Performed by: PHYSICIAN ASSISTANT

## 2020-02-10 RX ORDER — HYDROCHLOROTHIAZIDE 12.5 MG/1
12.5 TABLET ORAL DAILY
COMMUNITY
Start: 2020-01-24 | End: 2020-02-10

## 2020-02-10 RX ORDER — LOSARTAN POTASSIUM 100 MG/1
100 TABLET ORAL DAILY
Qty: 90 TABLET | Refills: 1 | Status: SHIPPED | OUTPATIENT
Start: 2020-02-10 | End: 2020-09-01

## 2020-02-10 RX ORDER — LOSARTAN POTASSIUM 50 MG/1
50 TABLET ORAL DAILY
COMMUNITY
Start: 2020-01-24 | End: 2020-02-10

## 2020-02-10 RX ORDER — HYDROCHLOROTHIAZIDE 25 MG/1
25 TABLET ORAL DAILY
Qty: 90 TABLET | Refills: 1 | Status: SHIPPED | OUTPATIENT
Start: 2020-02-10 | End: 2020-09-01

## 2020-02-10 RX ORDER — RIZATRIPTAN BENZOATE 5 MG/1
5-10 TABLET ORAL
Qty: 18 TABLET | Refills: 6 | Status: SHIPPED | OUTPATIENT
Start: 2020-02-10 | End: 2021-08-20

## 2020-02-10 ASSESSMENT — MIFFLIN-ST. JEOR: SCORE: 1161.26

## 2020-02-24 ENCOUNTER — ALLIED HEALTH/NURSE VISIT (OUTPATIENT)
Dept: FAMILY MEDICINE | Facility: OTHER | Age: 50
End: 2020-02-24
Payer: COMMERCIAL

## 2020-02-24 VITALS — HEART RATE: 96 BPM | SYSTOLIC BLOOD PRESSURE: 136 MMHG | DIASTOLIC BLOOD PRESSURE: 86 MMHG

## 2020-02-24 DIAGNOSIS — Z01.30 BP CHECK: Primary | ICD-10-CM

## 2020-02-24 PROCEDURE — 99207 ZZC NO CHARGE NURSE ONLY: CPT

## 2020-02-24 NOTE — PROGRESS NOTES
Maranda Figueroa is a 49 year old patient who comes in today for a Blood Pressure check.  Initial BP:  /86   LMP 01/26/2020 (Exact Date)      Data Unavailable  Disposition: follow-up as previously indicated by provider

## 2020-07-14 DIAGNOSIS — K21.9 GASTROESOPHAGEAL REFLUX DISEASE WITHOUT ESOPHAGITIS: ICD-10-CM

## 2020-07-15 NOTE — TELEPHONE ENCOUNTER
Routing refill request to provider for review/approval because:  A break in medication    Lilia Brown, RN, BSN

## 2020-08-28 DIAGNOSIS — G43.711 INTRACTABLE CHRONIC MIGRAINE WITHOUT AURA AND WITH STATUS MIGRAINOSUS: ICD-10-CM

## 2020-08-28 DIAGNOSIS — I10 HTN, GOAL BELOW 140/90: ICD-10-CM

## 2020-08-28 DIAGNOSIS — K21.9 GASTROESOPHAGEAL REFLUX DISEASE WITHOUT ESOPHAGITIS: ICD-10-CM

## 2020-08-28 DIAGNOSIS — G44.209 ACUTE NON INTRACTABLE TENSION-TYPE HEADACHE: ICD-10-CM

## 2020-09-01 RX ORDER — HYDROCHLOROTHIAZIDE 25 MG/1
TABLET ORAL
Qty: 90 TABLET | Refills: 1 | Status: SHIPPED | OUTPATIENT
Start: 2020-09-01 | End: 2021-04-09

## 2020-09-01 RX ORDER — LOSARTAN POTASSIUM 100 MG/1
TABLET ORAL
Qty: 90 TABLET | Refills: 1 | Status: SHIPPED | OUTPATIENT
Start: 2020-09-01 | End: 2021-04-09

## 2021-04-09 DIAGNOSIS — G44.209 ACUTE NON INTRACTABLE TENSION-TYPE HEADACHE: ICD-10-CM

## 2021-04-09 DIAGNOSIS — G43.711 INTRACTABLE CHRONIC MIGRAINE WITHOUT AURA AND WITH STATUS MIGRAINOSUS: ICD-10-CM

## 2021-04-09 DIAGNOSIS — I10 HTN, GOAL BELOW 140/90: ICD-10-CM

## 2021-04-09 RX ORDER — HYDROCHLOROTHIAZIDE 25 MG/1
25 TABLET ORAL DAILY
Qty: 90 TABLET | Refills: 0 | Status: SHIPPED | OUTPATIENT
Start: 2021-04-09 | End: 2021-08-06

## 2021-04-09 RX ORDER — LOSARTAN POTASSIUM 100 MG/1
100 TABLET ORAL DAILY
Qty: 90 TABLET | Refills: 0 | Status: SHIPPED | OUTPATIENT
Start: 2021-04-09 | End: 2021-08-20

## 2021-04-09 NOTE — TELEPHONE ENCOUNTER
Pending Prescriptions:                       Disp   Refills    hydrochlorothiazide (HYDRODIURIL) 25 MG t*90 tab*0            Sig: Take 1 tablet (25 mg) by mouth daily    losartan (COZAAR) 100 MG tablet           90 tab*0            Sig: Take 1 tablet (100 mg) by mouth daily    Medication is being filled for 1 time elza refill only due to:  Patient is due for med check    Please call and help schedule.  Thank you!

## 2021-07-21 DIAGNOSIS — G44.209 ACUTE NON INTRACTABLE TENSION-TYPE HEADACHE: ICD-10-CM

## 2021-07-21 DIAGNOSIS — I10 HTN, GOAL BELOW 140/90: ICD-10-CM

## 2021-07-21 DIAGNOSIS — G43.711 INTRACTABLE CHRONIC MIGRAINE WITHOUT AURA AND WITH STATUS MIGRAINOSUS: ICD-10-CM

## 2021-07-21 NOTE — TELEPHONE ENCOUNTER
"Pending Prescriptions:                       Disp   Refills    hydrochlorothiazide (HYDRODIURIL) 25 MG ta*90 tab*0        Sig: TAKE ONE TABLET BY MOUTH ONCE DAILY    losartan (COZAAR) 100 MG tablet [Pharmacy *90 tab*0        Sig: TAKE ONE TABLET BY MOUTH ONCE DAILY    Routing refill request to provider for review/approval because:  Labs not current:  Rerun Protocol (7/21/2021 2:53 PM)    Blood pressure under 140/90 in past 12 months        BP Readings from Last 3 Encounters:   02/24/20 136/86   02/10/20 (!) 170/102   01/24/20 (!) 164/126           Recent (12 mo) or future (30 days) visit within the authorizing provider's specialty    Patient has had an office visit with the authorizing provider or a provider within the authorizing providers department within the previous 12 mos or has a future within next 30 days. See \"Patient Info\" tab in inbasket, or \"Choose Columns\" in Meds & Orders section of the refill encounter.         Normal serum creatinine on file in past 12 months        Recent Labs   Lab Test 01/24/20  1131   CR 0.54        Normal serum potassium on file in past 12 months        Recent Labs   Lab Test 01/24/20  1131   POTASSIUM 4.1                 Normal serum sodium on file in past 12 months        Recent Labs   Lab Test 01/24/20  1131              " ----- Message from Lizet Ramos MD sent at 5/7/2021  9:29 AM CDT -----  Please call patient with results. Will discuss at upcoming appointment.

## 2021-07-22 RX ORDER — LOSARTAN POTASSIUM 100 MG/1
TABLET ORAL
Qty: 90 TABLET | Refills: 0 | OUTPATIENT
Start: 2021-07-22

## 2021-07-22 RX ORDER — HYDROCHLOROTHIAZIDE 25 MG/1
TABLET ORAL
Qty: 90 TABLET | Refills: 0 | OUTPATIENT
Start: 2021-07-22

## 2021-07-22 NOTE — TELEPHONE ENCOUNTER
No further refills without office visit.  Needs to establish care  Electronically signed:    Scott Christie PA-C

## 2021-07-23 NOTE — TELEPHONE ENCOUNTER
Spoke to patient, she was at work, so unable to schedule appointment. Will call back and then I told her we would send the refill request at that point to the provider she chooses to establish with to get her to that appt.    Vita Morley on 7/23/2021 at 3:58 PM

## 2021-08-06 DIAGNOSIS — I10 ESSENTIAL HYPERTENSION WITH GOAL BLOOD PRESSURE LESS THAN 140/90: ICD-10-CM

## 2021-08-06 DIAGNOSIS — G43.711 INTRACTABLE CHRONIC MIGRAINE WITHOUT AURA AND WITH STATUS MIGRAINOSUS: ICD-10-CM

## 2021-08-06 DIAGNOSIS — I10 HTN, GOAL BELOW 140/90: ICD-10-CM

## 2021-08-06 DIAGNOSIS — G44.209 ACUTE NON INTRACTABLE TENSION-TYPE HEADACHE: ICD-10-CM

## 2021-08-06 RX ORDER — HYDROCHLOROTHIAZIDE 25 MG/1
25 TABLET ORAL DAILY
Qty: 30 TABLET | Refills: 0 | Status: SHIPPED | OUTPATIENT
Start: 2021-08-06 | End: 2021-08-20

## 2021-08-06 RX ORDER — DILTIAZEM HYDROCHLORIDE 240 MG/1
240 CAPSULE, COATED, EXTENDED RELEASE ORAL DAILY
Qty: 30 CAPSULE | Refills: 0 | Status: SHIPPED | OUTPATIENT
Start: 2021-08-06 | End: 2021-08-20

## 2021-08-06 NOTE — TELEPHONE ENCOUNTER
Requested Prescriptions   Pending Prescriptions Disp Refills     diltiazem ER COATED BEADS (CARDIZEM CD) 240 MG 24 hr capsule 30 capsule 0     Sig: Take 1 capsule (240 mg) by mouth daily               hydrochlorothiazide (HYDRODIURIL) 25 MG tablet 90 tablet 0     Sig: Take 1 tablet (25 mg) by mouth daily               Patient needs enough to get to appt on 8/20/21.

## 2021-08-20 ENCOUNTER — VIRTUAL VISIT (OUTPATIENT)
Dept: FAMILY MEDICINE | Facility: CLINIC | Age: 51
End: 2021-08-20
Payer: COMMERCIAL

## 2021-08-20 DIAGNOSIS — F33.1 MAJOR DEPRESSIVE DISORDER, RECURRENT EPISODE, MODERATE (H): Primary | ICD-10-CM

## 2021-08-20 DIAGNOSIS — F41.1 GAD (GENERALIZED ANXIETY DISORDER): ICD-10-CM

## 2021-08-20 DIAGNOSIS — G43.711 INTRACTABLE CHRONIC MIGRAINE WITHOUT AURA AND WITH STATUS MIGRAINOSUS: ICD-10-CM

## 2021-08-20 DIAGNOSIS — F51.04 PSYCHOPHYSIOLOGICAL INSOMNIA: ICD-10-CM

## 2021-08-20 DIAGNOSIS — I10 HTN, GOAL BELOW 140/90: ICD-10-CM

## 2021-08-20 DIAGNOSIS — K21.9 GASTROESOPHAGEAL REFLUX DISEASE WITHOUT ESOPHAGITIS: ICD-10-CM

## 2021-08-20 PROCEDURE — 99214 OFFICE O/P EST MOD 30 MIN: CPT | Mod: TEL | Performed by: STUDENT IN AN ORGANIZED HEALTH CARE EDUCATION/TRAINING PROGRAM

## 2021-08-20 RX ORDER — BUPROPION HYDROCHLORIDE 150 MG/1
150 TABLET, EXTENDED RELEASE ORAL 2 TIMES DAILY
Qty: 180 TABLET | Refills: 0 | Status: SHIPPED | OUTPATIENT
Start: 2021-08-20 | End: 2021-09-17

## 2021-08-20 RX ORDER — HYDROCHLOROTHIAZIDE 25 MG/1
25 TABLET ORAL DAILY
Qty: 90 TABLET | Refills: 0 | Status: SHIPPED | OUTPATIENT
Start: 2021-08-20 | End: 2021-09-17

## 2021-08-20 RX ORDER — LOSARTAN POTASSIUM 100 MG/1
100 TABLET ORAL DAILY
Qty: 90 TABLET | Refills: 0 | Status: SHIPPED | OUTPATIENT
Start: 2021-08-20 | End: 2021-09-17

## 2021-08-20 RX ORDER — RIZATRIPTAN BENZOATE 5 MG/1
5-10 TABLET ORAL
Qty: 18 TABLET | Refills: 1 | Status: SHIPPED | OUTPATIENT
Start: 2021-08-20 | End: 2022-10-06

## 2021-08-20 NOTE — PROGRESS NOTES
Maranda is a 51 year old who is being evaluated via a billable telephone visit.      What phone number would you like to be contacted at? [phone]  How would you like to obtain your AVS? Mail a copy    Assessment & Plan     Major depressive disorder, recurrent episode, moderate (H)  Stable. Continue current medication(s) and dose(s).   - buPROPion (WELLBUTRIN SR) 150 MG 12 hr tablet; Take 1 tablet (150 mg) by mouth 2 times daily    SILVIA (generalized anxiety disorder)  Stable. Continue current medication(s) and dose(s).   - buPROPion (WELLBUTRIN SR) 150 MG 12 hr tablet; Take 1 tablet (150 mg) by mouth 2 times daily    HTN, goal below 140/90  Scheduled for in person visit in one month to do labs and recheck BP.  - hydrochlorothiazide (HYDRODIURIL) 25 MG tablet; Take 1 tablet (25 mg) by mouth daily  - losartan (COZAAR) 100 MG tablet; Take 1 tablet (100 mg) by mouth daily    Intractable chronic migraine without aura and with status migrainosus  Stable. Continue current medication(s) and dose(s).   - hydrochlorothiazide (HYDRODIURIL) 25 MG tablet; Take 1 tablet (25 mg) by mouth daily  - losartan (COZAAR) 100 MG tablet; Take 1 tablet (100 mg) by mouth daily  - rizatriptan (MAXALT) 5 MG tablet; Take 1-2 tablets (5-10 mg) by mouth at onset of headache for migraine May repeat in 2 hours. Max 6 tablets/24 hours.    Gastroesophageal reflux disease without esophagitis  Stable. Continue current medication(s) and dose(s).   - omeprazole (PRILOSEC) 20 MG DR capsule; TAKE ONE CAPSULE BY MOUTH ONCE DAILY    Insomnia  Recommend trying melatonin. If not effective, will consider trial of trazodone    Recommend trial of melatonin. If not effective will discuss trial of trazodone when I see her in Sept.    20 minutes spent on the date of the encounter doing chart review, patient visit and documentation         No follow-ups on file.    MANUEL Ramires [facility]   Maranda is a 51  year old who presents for the following health issues     HPI     New Patient/Transfer of Care  Chief Complaint   Patient presents with     [facility] Care     Recheck Medication     hydrochlorathiazide, losartan, wellbutrin, omeprazole, rizatriptan     PHQ 7/10/2018 1/24/2020 8/6/2021   PHQ-9 Total Score 8 9 3   Q9: Thoughts of better off dead/self-harm past 2 weeks Not at all Not at all Not at all     SILVIA-7 SCORE 7/10/2018 1/24/2020 8/6/2021   Total Score - - -   Total Score 6 (mild anxiety) 10 (moderate anxiety) -   Total Score 6 10 6     Sleep - Nyquil liquid - helped her fall asleep. Hasn't tried melatonin. Thought about trying Valerian root herbal supplement. Hard time falling asleep related to anxiety, hard time shutting off thoughts.     Has anxiety about driving, will take back roads. Kids have to come to see her because she does not like to drive.     Works as a  which is high stress but has been doing it for 30 years and likes the hours and money.    . Daughter went through treatment for mental health, worries about her. Worries about other kids.       Review of Systems   Constitutional, HEENT, cardiovascular, pulmonary, gi and gu systems are negative, except as otherwise noted.      Objective           Vitals:  No vitals were obtained today due to virtual visit.    Physical Exam   healthy, alert and no distress  PSYCH: Alert and oriented times 3; coherent speech, normal   rate and volume, able to articulate logical thoughts, able   to abstract reason, no tangential thoughts, no hallucinations   or delusions  Her affect is normal  RESP: No cough, no audible wheezing, able to talk in full sentences  Remainder of exam unable to be completed due to telephone visits                Phone call duration: 20 minutes

## 2021-09-17 ENCOUNTER — OFFICE VISIT (OUTPATIENT)
Dept: FAMILY MEDICINE | Facility: CLINIC | Age: 51
End: 2021-09-17
Payer: COMMERCIAL

## 2021-09-17 ENCOUNTER — TELEPHONE (OUTPATIENT)
Dept: FAMILY MEDICINE | Facility: CLINIC | Age: 51
End: 2021-09-17

## 2021-09-17 VITALS
DIASTOLIC BLOOD PRESSURE: 82 MMHG | TEMPERATURE: 98.1 F | HEART RATE: 116 BPM | RESPIRATION RATE: 18 BRPM | BODY MASS INDEX: 23.92 KG/M2 | SYSTOLIC BLOOD PRESSURE: 132 MMHG | OXYGEN SATURATION: 100 % | WEIGHT: 130 LBS | HEIGHT: 62 IN

## 2021-09-17 DIAGNOSIS — Z13.220 SCREENING FOR LIPOID DISORDERS: ICD-10-CM

## 2021-09-17 DIAGNOSIS — G43.711 INTRACTABLE CHRONIC MIGRAINE WITHOUT AURA AND WITH STATUS MIGRAINOSUS: ICD-10-CM

## 2021-09-17 DIAGNOSIS — Z00.00 ROUTINE GENERAL MEDICAL EXAMINATION AT A HEALTH CARE FACILITY: Primary | ICD-10-CM

## 2021-09-17 DIAGNOSIS — Z12.31 ENCOUNTER FOR SCREENING MAMMOGRAM FOR BREAST CANCER: ICD-10-CM

## 2021-09-17 DIAGNOSIS — R73.9 HYPERGLYCEMIA: Primary | ICD-10-CM

## 2021-09-17 DIAGNOSIS — F33.1 MAJOR DEPRESSIVE DISORDER, RECURRENT EPISODE, MODERATE (H): ICD-10-CM

## 2021-09-17 DIAGNOSIS — Z12.4 SCREENING FOR MALIGNANT NEOPLASM OF CERVIX: ICD-10-CM

## 2021-09-17 DIAGNOSIS — I10 HTN, GOAL BELOW 140/90: ICD-10-CM

## 2021-09-17 DIAGNOSIS — F41.1 GAD (GENERALIZED ANXIETY DISORDER): ICD-10-CM

## 2021-09-17 LAB
ANION GAP SERPL CALCULATED.3IONS-SCNC: 6 MMOL/L (ref 3–14)
BUN SERPL-MCNC: 14 MG/DL (ref 7–30)
CALCIUM SERPL-MCNC: 9.2 MG/DL (ref 8.5–10.1)
CHLORIDE BLD-SCNC: 102 MMOL/L (ref 94–109)
CHOLEST SERPL-MCNC: 297 MG/DL
CO2 SERPL-SCNC: 28 MMOL/L (ref 20–32)
CREAT SERPL-MCNC: 0.66 MG/DL (ref 0.52–1.04)
FASTING STATUS PATIENT QL REPORTED: YES
GFR SERPL CREATININE-BSD FRML MDRD: >90 ML/MIN/1.73M2
GLUCOSE BLD-MCNC: 131 MG/DL (ref 70–99)
HDLC SERPL-MCNC: 147 MG/DL
LDLC SERPL CALC-MCNC: 134 MG/DL
NONHDLC SERPL-MCNC: 150 MG/DL
POTASSIUM BLD-SCNC: 3.7 MMOL/L (ref 3.4–5.3)
SODIUM SERPL-SCNC: 136 MMOL/L (ref 133–144)
TRIGL SERPL-MCNC: 79 MG/DL

## 2021-09-17 PROCEDURE — G0145 SCR C/V CYTO,THINLAYER,RESCR: HCPCS | Performed by: STUDENT IN AN ORGANIZED HEALTH CARE EDUCATION/TRAINING PROGRAM

## 2021-09-17 PROCEDURE — 99396 PREV VISIT EST AGE 40-64: CPT | Performed by: STUDENT IN AN ORGANIZED HEALTH CARE EDUCATION/TRAINING PROGRAM

## 2021-09-17 PROCEDURE — 80048 BASIC METABOLIC PNL TOTAL CA: CPT | Performed by: STUDENT IN AN ORGANIZED HEALTH CARE EDUCATION/TRAINING PROGRAM

## 2021-09-17 PROCEDURE — 87624 HPV HI-RISK TYP POOLED RSLT: CPT | Performed by: STUDENT IN AN ORGANIZED HEALTH CARE EDUCATION/TRAINING PROGRAM

## 2021-09-17 PROCEDURE — 36415 COLL VENOUS BLD VENIPUNCTURE: CPT | Performed by: STUDENT IN AN ORGANIZED HEALTH CARE EDUCATION/TRAINING PROGRAM

## 2021-09-17 PROCEDURE — 80061 LIPID PANEL: CPT | Performed by: STUDENT IN AN ORGANIZED HEALTH CARE EDUCATION/TRAINING PROGRAM

## 2021-09-17 RX ORDER — BUPROPION HYDROCHLORIDE 150 MG/1
150 TABLET, EXTENDED RELEASE ORAL 2 TIMES DAILY
Qty: 180 TABLET | Refills: 3 | Status: SHIPPED | OUTPATIENT
Start: 2021-09-17 | End: 2022-10-06

## 2021-09-17 RX ORDER — HYDROCHLOROTHIAZIDE 25 MG/1
25 TABLET ORAL DAILY
Qty: 90 TABLET | Refills: 3 | Status: SHIPPED | OUTPATIENT
Start: 2021-09-17 | End: 2022-10-06

## 2021-09-17 RX ORDER — LOSARTAN POTASSIUM 100 MG/1
100 TABLET ORAL DAILY
Qty: 90 TABLET | Refills: 3 | Status: SHIPPED | OUTPATIENT
Start: 2021-09-17 | End: 2022-10-06

## 2021-09-17 ASSESSMENT — PAIN SCALES - GENERAL: PAINLEVEL: NO PAIN (0)

## 2021-09-17 ASSESSMENT — MIFFLIN-ST. JEOR: SCORE: 1167.93

## 2021-09-17 NOTE — TELEPHONE ENCOUNTER
----- Message from MANUEL Isaacs CNP sent at 9/17/2021 12:46 PM CDT -----  Please call patient. Her cholesterol was elevated at 297 but her HDL cholesterol is very high and HDL is protective against heart disease so I don't feel she needs a cholesterol medication. Her blood sugar was elevated so I recommend she do a lab visit to check a hemoglobin A1c to rule out type 2 diabetes. I put in an order for an A1C.  Nunu Gandara CNP

## 2021-09-17 NOTE — PROGRESS NOTES
SUBJECTIVE:   CC: Maranda Figueroa is an 51 year old woman who presents for preventive health visit.     Patient has been advised of split billing requirements and indicates understanding: Yes  Healthy Habits:    Getting at least 3 servings of Calcium per day:  NO    Bi-annual eye exam:  NO    Dental care twice a year:  NO    Sleep apnea or symptoms of sleep apnea:  None    Diet:  Regular (no restrictions)    Frequency of exercise:  4-5 days/week    Duration of exercise:  Greater than 60 minutes    Taking medications regularly:  Yes    Barriers to taking medications:  None    Medication side effects:  None    PHQ-2 Total Score:    Additional concerns today:  No          Today's PHQ-2 Score:   PHQ-2 (  [employer]) 2021   Q1: Little interest or pleasure in doing things 0   Q2: Feeling down, depressed or hopeless 0   PHQ-2 Score 0   Q1: Little interest or pleasure in doing things -   Q2: Feeling down, depressed or hopeless -   PHQ-2 Score -       Abuse: Current or Past (Physical, Sexual or Emotional) - No  Do you feel safe in your environment? Yes    Have you ever done Advance Care Planning? (For example, a Health Directive, POLST, or a discussion with a medical provider or your loved ones about your wishes): No, advance care planning information given to patient to review.  Patient plans to discuss their wishes with loved ones or provider.      Social History     Tobacco Use     Smoking status: Former Smoker     Quit date: 1994     Years since quittin.3     Smokeless tobacco: Never Used   Substance Use Topics     Alcohol use: Yes     Alcohol/week: 1.7 standard drinks     Comment: Occ.     If you drink alcohol do you typically have >3 drinks per day or >7 drinks per week? No    Alcohol Use 2020   Prescreen: >3 drinks/day or >7 drinks/week? Yes   Prescreen: >3 drinks/day or >7 drinks/week? -   AUDIT SCORE  10       Reviewed orders with patient.  Reviewed health maintenance and updated orders  accordingly - Yes  Lab work is in process  Labs reviewed in EPIC  BP Readings from Last 3 Encounters:   21 132/82   20 136/86   02/10/20 (!) 170/102    Wt Readings from Last 3 Encounters:   21 59 kg (130 lb)   02/10/20 58.7 kg (129 lb 6.4 oz)   20 60.5 kg (133 lb 6.4 oz)                  Patient Active Problem List   Diagnosis     Anxiety state     Major depression in complete remission (H)     Hearing impairment     Insomnia     HTN, goal below 140/90     Vitamin D deficiency     Gastroesophageal reflux disease without esophagitis     Panic attack     Intractable chronic migraine without aura and with status migrainosus     Past Surgical History:   Procedure Laterality Date     ESOPHAGOSCOPY, GASTROSCOPY, DUODENOSCOPY (EGD), COMBINED N/A 10/5/2015    Procedure: COMBINED ESOPHAGOSCOPY, GASTROSCOPY, DUODENOSCOPY (EGD), BIOPSY SINGLE OR MULTIPLE;  Surgeon: Calin Kenyon MD;  Location: [facility]     OPEN REDUCTION INTERNAL FIXATION RODDING INTRAMEDULLARY TIBIA  2011    Procedure:OPEN REDUCTION INTERNAL FIXATION RODDING INTRAMEDULLARY TIBIA; Open Reduction internal fixation rodding right tibia; Surgeon:JUNO TUTTLE; Location:[facility]     TYMPANOPLASTY, RT/LT      Tympanoplasty/LT       Social History     Tobacco Use     Smoking status: Former Smoker     Quit date: 1994     Years since quittin.3     Smokeless tobacco: Never Used   Substance Use Topics     Alcohol use: Yes     Alcohol/week: 1.7 standard drinks     Comment: Occ.     Family History   Problem Relation Age of Onset     Diabetes Mother      Hypertension Mother      Thyroid Disease Mother      Cancer - colorectal Mother      Diabetes Father      Hypertension Father      Heart Disease Maternal Grandmother      Diabetes Maternal Grandfather          Current Outpatient Medications   Medication Sig Dispense Refill     buPROPion (WELLBUTRIN SR) 150 MG 12 hr tablet Take 1 tablet (150 mg) by mouth 2 times daily 180 tablet 3      hydrochlorothiazide (HYDRODIURIL) 25 MG tablet Take 1 tablet (25 mg) by mouth daily 90 tablet 3     losartan (COZAAR) 100 MG tablet Take 1 tablet (100 mg) by mouth daily 90 tablet 3     omeprazole (PRILOSEC) 20 MG DR capsule TAKE ONE CAPSULE BY MOUTH ONCE DAILY 90 capsule 3     rizatriptan (MAXALT) 5 MG tablet Take 1-2 tablets (5-10 mg) by mouth at onset of headache for migraine May repeat in 2 hours. Max 6 tablets/24 hours. 18 tablet 1     Allergies   Allergen Reactions     Penicillins      Causes nausea with emesis     Recent Labs   Lab Test 01/24/20  1131 02/02/19  1314 07/10/18  0900 07/10/18  0900 09/22/15  1000 11/19/13  0951   LDL Cannot estimate LDL when triglyceride exceeds 400 mg/dL  92  --   --   --   --  136*   HDL 81  --   --   --   --  78   TRIG 418*  --   --   --   --  107   ALT 35 57*  --  65*   < >  --    CR 0.54 0.63   < > 0.62   < >  --    GFRESTIMATED >90 >90   < > >90   < >  --    GFRESTBLACK >90 >90   < > >90   < >  --    POTASSIUM 4.1 3.7   < > 4.4   < >  --    TSH 1.05  --   --  0.97   < >  --     < > = values in this interval not displayed.        Breast Cancer Screening:  Any new diagnosis of family breast, ovarian, or bowel cancer? No    FHS-7: No flowsheet data found.    Mammogram Screening: Recommended annual mammography  Pertinent mammograms are reviewed under the imaging tab.    History of abnormal Pap smear:   NO - age 30-65 PAP every 5 years with negative HPV co-testing recommended  Last 3 Pap and HPV Results:   PAP / HPV Latest Ref Rng & Units 7/10/2018 11/19/2013   PAP (Historical) - NIL OTHER-NIL, See Result   HPV16 NEG:Negative Negative -   HPV18 NEG:Negative Negative -   HRHPV NEG:Negative Negative -     PAP / HPV Latest Ref Rng & Units 7/10/2018 11/19/2013   PAP (Historical) - NIL OTHER-NIL, See Result   HPV16 NEG:Negative Negative -   HPV18 NEG:Negative Negative -   HRHPV NEG:Negative Negative -     Reviewed and updated as needed this visit by clinical staff  Tobacco   "Allergies  Meds   Med Hx  Surg Hx  Fam Hx  Soc Hx        Reviewed and updated as needed this visit by Provider                Past Medical History:   Diagnosis Date     Anxiety state, unspecified      Depressive disorder, not elsewhere classified      Unspecified conductive hearing loss     deaf in left ear     Vitamin D deficiency 2/10/2016      Past Surgical History:   Procedure Laterality Date     ESOPHAGOSCOPY, GASTROSCOPY, DUODENOSCOPY (EGD), COMBINED N/A 10/5/2015    Procedure: COMBINED ESOPHAGOSCOPY, GASTROSCOPY, DUODENOSCOPY (EGD), BIOPSY SINGLE OR MULTIPLE;  Surgeon: Calin Kenyon MD;  Location: [facility]     OPEN REDUCTION INTERNAL FIXATION RODDING INTRAMEDULLARY TIBIA  11/6/2011    Procedure:OPEN REDUCTION INTERNAL FIXATION RODDING INTRAMEDULLARY TIBIA; Open Reduction internal fixation rodding right tibia; Surgeon:JUNO TUTTLE; Location:[facility]     TYMPANOPLASTY, RT/LT      Tympanoplasty/LT     OB History   No obstetric history on file.       Review of Systems  CONSTITUTIONAL: NEGATIVE for fever, chills, change in weight  INTEGUMENTARU/SKIN: NEGATIVE for worrisome rashes, moles or lesions  EYES: NEGATIVE for vision changes or irritation  ENT: NEGATIVE for ear, mouth and throat problems  RESP: NEGATIVE for significant cough or SOB  BREAST: NEGATIVE for masses, tenderness or discharge  CV: NEGATIVE for chest pain, palpitations or peripheral edema  GI: NEGATIVE for nausea, abdominal pain, heartburn, or change in bowel habits  : NEGATIVE for unusual urinary or vaginal symptoms. Periods are regular.  MUSCULOSKELETAL: NEGATIVE for significant arthralgias or myalgia  NEURO: NEGATIVE for weakness, dizziness or paresthesias  PSYCHIATRIC: NEGATIVE for changes in mood or affect     OBJECTIVE:   /82 (BP Location: Right arm, Patient Position: Sitting, Cuff Size: Adult Regular)   Pulse 116   Temp 98.1  F (36.7  C) (Temporal)   Resp 18   Ht 1.575 m (5' 2\")   Wt 59 kg (130 lb)   LMP 09/06/2021   " SpO2 100%   BMI 23.78 kg/m    Physical Exam  GENERAL: healthy, alert and no distress  EYES: Eyes grossly normal to inspection, PERRL and conjunctivae and sclerae normal  HENT: ear canals and TM's normal, nose and mouth without ulcers or lesions  NECK: no adenopathy, no asymmetry, masses, or scars and thyroid normal to palpation  RESP: lungs clear to auscultation - no rales, rhonchi or wheezes  BREAST: normal without masses, tenderness or nipple discharge and no palpable axillary masses or adenopathy  CV: regular rate and rhythm, normal S1 S2, no S3 or S4, no murmur, click or rub, no peripheral edema and peripheral pulses strong  ABDOMEN: soft, nontender, no hepatosplenomegaly, no masses and bowel sounds normal   (female): normal female external genitalia, normal urethral meatus, vaginal mucosa pink, moist, well rugated, and normal cervix/adnexa/uterus without masses or discharge  MS: no gross musculoskeletal defects noted, no edema  SKIN: no suspicious lesions or rashes  NEURO: Normal strength and tone, mentation intact and speech normal  PSYCH: mentation appears normal, affect normal/bright    Diagnostic Test Results:  Labs reviewed in Epic    ASSESSMENT/PLAN:       ICD-10-CM    1. Routine general medical examination at a health care facility  Z00.00    2. HTN, goal below 140/90  I10 Basic metabolic panel  (Ca, Cl, CO2, Creat, Gluc, K, Na, BUN)     losartan (COZAAR) 100 MG tablet     hydrochlorothiazide (HYDRODIURIL) 25 MG tablet   3. Intractable chronic migraine without aura and with status migrainosus  G43.711 losartan (COZAAR) 100 MG tablet     hydrochlorothiazide (HYDRODIURIL) 25 MG tablet   4. Major depressive disorder, recurrent episode, moderate (H)  F33.1 buPROPion (WELLBUTRIN SR) 150 MG 12 hr tablet   5. SILVIA (generalized anxiety disorder)  F41.1 buPROPion (WELLBUTRIN SR) 150 MG 12 hr tablet   6. Screening for lipoid disorders  Z13.220 Lipid panel reflex to direct LDL Fasting   7. Screening for malignant  "neoplasm of cervix  Z12.4 PAP screen with HPV - recommended age 30 - 65 years   8. Encounter for screening mammogram for breast cancer  Z12.31 *MA Screening Digital Bilateral     All chronic conditions were reviewed at recent virtual visit. This visit was only preventive.    Patient has been advised of split billing requirements and indicates understanding: Yes  COUNSELING:  Reviewed preventive health counseling, as reflected in patient instructions       Regular exercise       Healthy diet/nutrition       Immunizations    Declined: Influenza due to Conscientious objector            Estimated body mass index is 23.78 kg/m  as calculated from the following:    Height as of this encounter: 1.575 m (5' 2\").    Weight as of this encounter: 59 kg (130 lb).        She reports that she quit smoking about 27 years ago. She has never used smokeless tobacco.      Counseling Resources:  ATP IV Guidelines  Pooled Cohorts Equation Calculator  Breast Cancer Risk Calculator  BRCA-Related Cancer Risk Assessment: FHS-7 Tool  FRAX Risk Assessment  ICSI Preventive Guidelines  Dietary Guidelines for Americans, 2010  USDA's MyPlate  ASA Prophylaxis  Lung CA Screening    MANUEL Ramires [facility]  "

## 2021-09-21 LAB
BKR LAB AP GYN ADEQUACY: NORMAL
BKR LAB AP GYN INTERPRETATION: NORMAL
BKR LAB AP HPV REFLEX: NORMAL
BKR LAB AP LMP: NORMAL
BKR LAB AP PREVIOUS ABNORMAL: NORMAL
PATH REPORT.COMMENTS IMP SPEC: NORMAL
PATH REPORT.RELEVANT HX SPEC: NORMAL

## 2021-09-23 LAB
HUMAN PAPILLOMA VIRUS 16 DNA: NEGATIVE
HUMAN PAPILLOMA VIRUS 18 DNA: NEGATIVE
HUMAN PAPILLOMA VIRUS FINAL DIAGNOSIS: NORMAL
HUMAN PAPILLOMA VIRUS OTHER HR: NEGATIVE

## 2021-09-27 ENCOUNTER — HOSPITAL ENCOUNTER (OUTPATIENT)
Dept: MAMMOGRAPHY | Facility: CLINIC | Age: 51
Discharge: HOME | End: 2021-09-27
Attending: STUDENT IN AN ORGANIZED HEALTH CARE EDUCATION/TRAINING PROGRAM | Admitting: STUDENT IN AN ORGANIZED HEALTH CARE EDUCATION/TRAINING PROGRAM
Payer: COMMERCIAL

## 2021-09-27 DIAGNOSIS — Z12.31 ENCOUNTER FOR SCREENING MAMMOGRAM FOR BREAST CANCER: ICD-10-CM

## 2021-09-27 PROCEDURE — 77063 BREAST TOMOSYNTHESIS BI: CPT

## 2021-10-04 ENCOUNTER — HOSPITAL ENCOUNTER (OUTPATIENT)
Dept: ULTRASOUND IMAGING | Facility: CLINIC | Age: 51
Discharge: HOME | End: 2021-10-04
Attending: STUDENT IN AN ORGANIZED HEALTH CARE EDUCATION/TRAINING PROGRAM | Admitting: STUDENT IN AN ORGANIZED HEALTH CARE EDUCATION/TRAINING PROGRAM
Payer: COMMERCIAL

## 2021-10-04 DIAGNOSIS — R92.8 ABNORMAL MAMMOGRAM: ICD-10-CM

## 2021-10-04 PROCEDURE — 76642 ULTRASOUND BREAST LIMITED: CPT | Mod: LT

## 2022-01-24 DIAGNOSIS — I10 HTN, GOAL BELOW 140/90: Primary | ICD-10-CM

## 2022-01-24 NOTE — LETTER
[address] [address]  Phone: [phone]  Fax: [phone]        January 28, 2022      Maranda Figueroa                                                                                                                                [id number] [address] [place] [id number]            Dear Ms. Figueroa,    We are concerned about your health care.  We recently provided you with a medication refill.  Many medications require routine follow-up with your Doctor.      At this time we ask that: You schedule an establish care appointment with a new provider. Please call the clinic at [phone] option 1 to schedule.     Your prescription: ( Diltiazem) Has been refilled for 1 time so you may have time for the above noted follow-up.      Thank you,      Joe Burroughs MD  Care Team

## 2022-01-25 NOTE — TELEPHONE ENCOUNTER
Pending Prescriptions:                       Disp   Refills    diltiazem ER COATED BEADS (CARDIZEM CD/CAR*30 cap*0        Sig: TAKE 1 CAPSULE (240 MG) BY MOUTH DAILY    Routing refill request to provider for review/approval because:  Drug not active on patient's medication list  Labs not current:  ALT > 12 months old   Lab Results   Component Value Date    ALT 35 01/24/2020

## 2022-01-26 RX ORDER — DILTIAZEM HYDROCHLORIDE 240 MG/1
CAPSULE, COATED, EXTENDED RELEASE ORAL
Qty: 30 CAPSULE | Refills: 0 | Status: SHIPPED | OUTPATIENT
Start: 2022-01-26 | End: 2022-10-06

## 2022-01-27 NOTE — TELEPHONE ENCOUNTER
Called and LM for patient to call back. Please relay note below and schedule appointment.   Vonda Licona MA     Please call patient to inform of limited refills. Nunu Gandara is no longer in clinic and patient will need to schedule appointment with any provider to establish new PCP prior to additional refills.   Joe Burroughs MD

## 2022-06-12 ENCOUNTER — APPOINTMENT (OUTPATIENT)
Dept: CT IMAGING | Facility: CLINIC | Age: 52
End: 2022-06-12
Attending: FAMILY MEDICINE
Payer: COMMERCIAL

## 2022-06-12 ENCOUNTER — HOSPITAL ENCOUNTER (EMERGENCY)
Facility: CLINIC | Age: 52
Discharge: HOME | End: 2022-06-12
Attending: FAMILY MEDICINE | Admitting: FAMILY MEDICINE
Payer: COMMERCIAL

## 2022-06-12 VITALS
RESPIRATION RATE: 16 BRPM | SYSTOLIC BLOOD PRESSURE: 110 MMHG | HEIGHT: 62 IN | TEMPERATURE: 97.9 F | WEIGHT: 135 LBS | OXYGEN SATURATION: 97 % | DIASTOLIC BLOOD PRESSURE: 78 MMHG | BODY MASS INDEX: 24.84 KG/M2 | HEART RATE: 87 BPM

## 2022-06-12 DIAGNOSIS — R10.9 ACUTE LEFT FLANK PAIN: ICD-10-CM

## 2022-06-12 DIAGNOSIS — M62.830 BACK MUSCLE SPASM: ICD-10-CM

## 2022-06-12 LAB
ALBUMIN UR-MCNC: NEGATIVE MG/DL
ANION GAP SERPL CALCULATED.3IONS-SCNC: 9 MMOL/L (ref 3–14)
APPEARANCE UR: CLEAR
BACTERIA #/AREA URNS HPF: ABNORMAL /HPF
BASOPHILS # BLD AUTO: 0 10E3/UL (ref 0–0.2)
BASOPHILS NFR BLD AUTO: 1 %
BILIRUB UR QL STRIP: NEGATIVE
BUN SERPL-MCNC: 12 MG/DL (ref 7–30)
CALCIUM SERPL-MCNC: 8.8 MG/DL (ref 8.5–10.1)
CHLORIDE BLD-SCNC: 105 MMOL/L (ref 94–109)
CO2 SERPL-SCNC: 26 MMOL/L (ref 20–32)
COLOR UR AUTO: ABNORMAL
CREAT SERPL-MCNC: 0.6 MG/DL (ref 0.52–1.04)
EOSINOPHIL # BLD AUTO: 0.1 10E3/UL (ref 0–0.7)
EOSINOPHIL NFR BLD AUTO: 2 %
ERYTHROCYTE [DISTWIDTH] IN BLOOD BY AUTOMATED COUNT: 12 % (ref 10–15)
GFR SERPL CREATININE-BSD FRML MDRD: >90 ML/MIN/1.73M2
GLUCOSE BLD-MCNC: 103 MG/DL (ref 70–99)
GLUCOSE UR STRIP-MCNC: NEGATIVE MG/DL
HCT VFR BLD AUTO: 36.9 % (ref 35–47)
HGB BLD-MCNC: 12.8 G/DL (ref 11.7–15.7)
HGB UR QL STRIP: ABNORMAL
HOLD SPECIMEN: NORMAL
IMM GRANULOCYTES # BLD: 0 10E3/UL
IMM GRANULOCYTES NFR BLD: 0 %
KETONES UR STRIP-MCNC: NEGATIVE MG/DL
LEUKOCYTE ESTERASE UR QL STRIP: NEGATIVE
LYMPHOCYTES # BLD AUTO: 1.5 10E3/UL (ref 0.8–5.3)
LYMPHOCYTES NFR BLD AUTO: 32 %
MCH RBC QN AUTO: 34 PG (ref 26.5–33)
MCHC RBC AUTO-ENTMCNC: 34.7 G/DL (ref 31.5–36.5)
MCV RBC AUTO: 98 FL (ref 78–100)
MONOCYTES # BLD AUTO: 0.7 10E3/UL (ref 0–1.3)
MONOCYTES NFR BLD AUTO: 14 %
MUCOUS THREADS #/AREA URNS LPF: PRESENT /LPF
NEUTROPHILS # BLD AUTO: 2.5 10E3/UL (ref 1.6–8.3)
NEUTROPHILS NFR BLD AUTO: 51 %
NITRATE UR QL: NEGATIVE
NRBC # BLD AUTO: 0 10E3/UL
NRBC BLD AUTO-RTO: 0 /100
PH UR STRIP: 5 [PH] (ref 5–7)
PLATELET # BLD AUTO: 192 10E3/UL (ref 150–450)
POTASSIUM BLD-SCNC: 3.9 MMOL/L (ref 3.4–5.3)
RBC # BLD AUTO: 3.77 10E6/UL (ref 3.8–5.2)
RBC URINE: 1 /HPF
SODIUM SERPL-SCNC: 140 MMOL/L (ref 133–144)
SP GR UR STRIP: 1 (ref 1–1.03)
SQUAMOUS EPITHELIAL: 1 /HPF
UROBILINOGEN UR STRIP-MCNC: NORMAL MG/DL
WBC # BLD AUTO: 4.8 10E3/UL (ref 4–11)
WBC URINE: 2 /HPF

## 2022-06-12 PROCEDURE — 99284 EMERGENCY DEPT VISIT MOD MDM: CPT | Mod: 25 | Performed by: FAMILY MEDICINE

## 2022-06-12 PROCEDURE — 96361 HYDRATE IV INFUSION ADD-ON: CPT | Performed by: FAMILY MEDICINE

## 2022-06-12 PROCEDURE — 96375 TX/PRO/DX INJ NEW DRUG ADDON: CPT | Performed by: FAMILY MEDICINE

## 2022-06-12 PROCEDURE — 36415 COLL VENOUS BLD VENIPUNCTURE: CPT | Performed by: FAMILY MEDICINE

## 2022-06-12 PROCEDURE — 81001 URINALYSIS AUTO W/SCOPE: CPT | Performed by: FAMILY MEDICINE

## 2022-06-12 PROCEDURE — 250N000011 HC RX IP 250 OP 636: Performed by: FAMILY MEDICINE

## 2022-06-12 PROCEDURE — 96374 THER/PROPH/DIAG INJ IV PUSH: CPT | Performed by: FAMILY MEDICINE

## 2022-06-12 PROCEDURE — 74176 CT ABD & PELVIS W/O CONTRAST: CPT

## 2022-06-12 PROCEDURE — 85014 HEMATOCRIT: CPT | Performed by: FAMILY MEDICINE

## 2022-06-12 PROCEDURE — 258N000003 HC RX IP 258 OP 636: Performed by: FAMILY MEDICINE

## 2022-06-12 PROCEDURE — 99284 EMERGENCY DEPT VISIT MOD MDM: CPT | Performed by: FAMILY MEDICINE

## 2022-06-12 PROCEDURE — 82310 ASSAY OF CALCIUM: CPT | Performed by: FAMILY MEDICINE

## 2022-06-12 RX ORDER — ONDANSETRON 2 MG/ML
4 INJECTION INTRAMUSCULAR; INTRAVENOUS ONCE
Status: COMPLETED | OUTPATIENT
Start: 2022-06-12 | End: 2022-06-12

## 2022-06-12 RX ORDER — KETOROLAC TROMETHAMINE 30 MG/ML
30 INJECTION, SOLUTION INTRAMUSCULAR; INTRAVENOUS ONCE
Status: COMPLETED | OUTPATIENT
Start: 2022-06-12 | End: 2022-06-12

## 2022-06-12 RX ORDER — CYCLOBENZAPRINE HCL 10 MG
10 TABLET ORAL 3 TIMES DAILY PRN
Qty: 15 TABLET | Refills: 0 | Status: SHIPPED | OUTPATIENT
Start: 2022-06-12 | End: 2024-01-23

## 2022-06-12 RX ORDER — SODIUM CHLORIDE 9 MG/ML
INJECTION, SOLUTION INTRAVENOUS CONTINUOUS
Status: DISCONTINUED | OUTPATIENT
Start: 2022-06-12 | End: 2022-06-12 | Stop reason: HOSPADM

## 2022-06-12 RX ORDER — OXYCODONE HYDROCHLORIDE 5 MG/1
5 TABLET ORAL EVERY 4 HOURS PRN
Qty: 12 TABLET | Refills: 0 | Status: SHIPPED | OUTPATIENT
Start: 2022-06-12 | End: 2022-10-06

## 2022-06-12 RX ADMIN — ONDANSETRON 4 MG: 2 INJECTION INTRAMUSCULAR; INTRAVENOUS at 06:38

## 2022-06-12 RX ADMIN — SODIUM CHLORIDE 1000 ML: 9 INJECTION, SOLUTION INTRAVENOUS at 06:37

## 2022-06-12 RX ADMIN — KETOROLAC TROMETHAMINE 30 MG: 30 INJECTION, SOLUTION INTRAMUSCULAR at 06:37

## 2022-06-12 NOTE — DISCHARGE INSTRUCTIONS
Ice 20 minutes per hour, (20 minutes on, 40 minutes off) at least 4 times a day.  Ibuprofen 600 mg and Tylenol 1000 mg every 6 hours as needed for pain.  You can take them at the same time.  Take with food so the Ibuprofen doesn't upset your stomach.    You may use the oxycodone sparingly for more severe pain.  All narcotics can cause drowsiness and constipation so be careful to avoid those problems.  Take an OTC stool softener if needed.  Narcotics also have addictive potential and can actually make you more sensitive to pain in as little as 3 days so only use them for a very short time.  You should not drive or operate machinery while taking narcotics.    If you develop a rash in the area of pain, you may be coming down with shingles.  You should be seen within 24 to 48 hours to get started on antiviral medication.    Recheck in clinic with your primary physician if persistent problems.  Return to the ED if worse/concerns.  It was nice visiting with you this morning.  I hope this settles down quickly for you.    Thank you for choosing [facility]. We appreciate the opportunity to meet your urgent medical needs. Please let us know if we could have done anything to make your stay more satisfying.    After discharge, please closely monitor for any new or worsening symptoms. Return to the Emergency Department if you develop any acute worsening signs or symptoms.    If you had lab work, cultures or imaging studies done during your stay, the final results may still be pending. We will call you if your plan of care needs to change. However, if you are not improving as expected, please follow up with your primary care provider or clinic.     Start any prescription medications that were prescribed to you and take them as directed.     Please see additional handouts that may be pertinent to your condition.

## 2022-06-12 NOTE — ED PROVIDER NOTES
History     Chief Complaint   Patient presents with     Flank Pain     HPI  Maranda Figueroa is a 52 year old female who presents to the ED this morning with acute onset of left flank pain that woke her from sleep at 5:00 this morning.  Is a sharp spasm type pain that worsens intermittently.  Some nausea but no vomiting.  No history of stones.  No leg swelling or prolonged immobility.  No history of blood clots.  No fevers chills or sweats.    Allergies:  Allergies   Allergen Reactions     Penicillins      Causes nausea with emesis       Problem List:    Patient Active Problem List    Diagnosis Date Noted     Intractable chronic migraine without aura and with status migrainosus 03/01/2018     Priority: Medium     Gastroesophageal reflux disease without esophagitis 04/20/2016     Priority: Medium     Panic attack 04/20/2016     Priority: Medium     Vitamin D deficiency 02/10/2016     Priority: Medium     HTN, goal below 140/90 10/07/2015     Priority: Medium     Major depression in complete remission (H) 11/19/2013     Priority: Medium     Hearing impairment 11/19/2013     Priority: Medium     Left ear       Insomnia 11/19/2013     Priority: Medium     Anxiety state 05/24/2006     Priority: Medium     Problem list name updated by automated process. Provider to review          Past Medical History:    Past Medical History:   Diagnosis Date     Anxiety state, unspecified      Depressive disorder, not elsewhere classified      Unspecified conductive hearing loss      Vitamin D deficiency 2/10/2016       Past Surgical History:    Past Surgical History:   Procedure Laterality Date     ESOPHAGOSCOPY, GASTROSCOPY, DUODENOSCOPY (EGD), COMBINED N/A 10/5/2015    Procedure: COMBINED ESOPHAGOSCOPY, GASTROSCOPY, DUODENOSCOPY (EGD), BIOPSY SINGLE OR MULTIPLE;  Surgeon: Calin Kenyon MD;  Location:  GI     OPEN REDUCTION INTERNAL FIXATION RODDING INTRAMEDULLARY TIBIA  11/6/2011    Procedure:OPEN REDUCTION INTERNAL FIXATION  "RODDING INTRAMEDULLARY TIBIA; Open Reduction internal fixation rodding right tibia; Surgeon:JUNO TUTTLE; Location:[facility]     TYMPANOPLASTY, RT/LT      Tympanoplasty/LT       Family History:    Family History   Problem Relation Age of Onset     Diabetes Mother      Hypertension Mother      Thyroid Disease Mother      Cancer - colorectal Mother      Diabetes Father      Hypertension Father      Heart Disease Maternal Grandmother      Diabetes Maternal Grandfather        Social History:  Marital Status:   [2]  Social History     Tobacco Use     Smoking status: Former Smoker     Quit date: 1994     Years since quittin.0     Smokeless tobacco: Never Used   Vaping Use     Vaping Use: Never used   Substance Use Topics     Alcohol use: Yes     Alcohol/week: 1.7 standard drinks     Comment: Occ.     Drug use: No        Medications:    buPROPion (WELLBUTRIN SR) 150 MG 12 hr tablet  cyclobenzaprine (FLEXERIL) 10 MG tablet  diltiazem ER COATED BEADS (CARDIZEM CD/CARTIA XT) 240 MG 24 hr capsule  hydrochlorothiazide (HYDRODIURIL) 25 MG tablet  losartan (COZAAR) 100 MG tablet  omeprazole (PRILOSEC) 20 MG DR capsule  oxyCODONE (ROXICODONE) 5 MG tablet  rizatriptan (MAXALT) 5 MG tablet          Review of Systems   All other systems reviewed and are negative.      Physical Exam   BP: (!) 136/94  Pulse: 98  Temp: 97.8  F (36.6  C)  Resp: 20  Height: 157.5 cm (5' 2\")  Weight: 61.2 kg (135 lb)  SpO2: 98 %      Physical Exam  Constitutional:       General: She is in acute distress (moderate).      Appearance: Normal appearance.   HENT:      Mouth/Throat:      Mouth: Mucous membranes are moist.   Eyes:      Extraocular Movements: Extraocular movements intact.   Cardiovascular:      Rate and Rhythm: Normal rate and regular rhythm.   Pulmonary:      Effort: Pulmonary effort is normal.      Breath sounds: Normal breath sounds.   Chest:      Chest wall: No tenderness.   Abdominal:      Palpations: Abdomen is soft.    "   Tenderness: There is left CVA tenderness.   Musculoskeletal:         General: No tenderness. Normal range of motion.      Right lower leg: No edema.      Left lower leg: No edema.   Skin:     General: Skin is warm and dry.      Findings: No rash ( no zoster).   Neurological:      General: No focal deficit present.      Mental Status: She is alert and oriented to person, place, and time.   Psychiatric:         Mood and Affect: Mood normal.         ED Course                 Procedures              Critical Care time:  none               Results for orders placed or performed during the hospital encounter of 06/12/22 (from the past 24 hour(s))   [place] Draw    Narrative    The following orders were created for panel order [place] Draw.  Procedure                               Abnormality         Status                     ---------                               -----------         ------                     Extra Blue Top Tube[[id number]]                              Final result               Extra Green Top (Lithium...[[id number]]                      Final result               Extra Purple Top Tube[[id number]]                            Final result                 Please view results for these tests on the individual orders.   Extra Blue Top Tube   Result Value Ref Range    Hold Specimen JIC    Extra Green Top (Lithium Heparin) Tube   Result Value Ref Range    Hold Specimen JIC    Extra Purple Top Tube   Result Value Ref Range    Hold Specimen JIC    CBC with platelets differential    Narrative    The following orders were created for panel order CBC with platelets differential.  Procedure                               Abnormality         Status                     ---------                               -----------         ------                     CBC with platelets and d...[[id number]]  Abnormal            Final result                 Please view results for these tests on the individual orders.   Basic  metabolic panel   Result Value Ref Range    Sodium 140 133 - 144 mmol/L    Potassium 3.9 3.4 - 5.3 mmol/L    Chloride 105 94 - 109 mmol/L    Carbon Dioxide (CO2) 26 20 - 32 mmol/L    Anion Gap 9 3 - 14 mmol/L    Urea Nitrogen 12 7 - 30 mg/dL    Creatinine 0.60 0.52 - 1.04 mg/dL    Calcium 8.8 8.5 - 10.1 mg/dL    Glucose 103 (H) 70 - 99 mg/dL    GFR Estimate >90 >60 mL/min/1.73m2   CBC with platelets and differential   Result Value Ref Range    WBC Count 4.8 4.0 - 11.0 10e3/uL    RBC Count 3.77 (L) 3.80 - 5.20 10e6/uL    Hemoglobin 12.8 11.7 - 15.7 g/dL    Hematocrit 36.9 35.0 - 47.0 %    MCV 98 78 - 100 fL    MCH 34.0 (H) 26.5 - 33.0 pg    MCHC 34.7 31.5 - 36.5 g/dL    RDW 12.0 10.0 - 15.0 %    Platelet Count 192 150 - 450 10e3/uL    % Neutrophils 51 %    % Lymphocytes 32 %    % Monocytes 14 %    % Eosinophils 2 %    % Basophils 1 %    % Immature Granulocytes 0 %    NRBCs per 100 WBC 0 <1 /100    Absolute Neutrophils 2.5 1.6 - 8.3 10e3/uL    Absolute Lymphocytes 1.5 0.8 - 5.3 10e3/uL    Absolute Monocytes 0.7 0.0 - 1.3 10e3/uL    Absolute Eosinophils 0.1 0.0 - 0.7 10e3/uL    Absolute Basophils 0.0 0.0 - 0.2 10e3/uL    Absolute Immature Granulocytes 0.0 <=0.4 10e3/uL    Absolute NRBCs 0.0 10e3/uL   CT Abdomen Pelvis w/o Contrast    Narrative    EXAM: CT ABDOMEN AND PELVIS WITHOUT CONTRAST  LOCATION: [facility]  DATE/TIME: 06/12/2022, 6:45 AM    INDICATION: Acute onset of left flank pain, suspect ureteral calculi.  COMPARISON: 10/14/2017.  TECHNIQUE: CT scan of the abdomen and pelvis was performed without IV contrast. Multiplanar reformats were obtained. Dose reduction techniques were used.  CONTRAST: None.    FINDINGS:   LOWER CHEST: Normal.    HEPATOBILIARY: Mild diffuse low-attenuation of the liver compatible with fatty infiltration. Gallbladder unremarkable.    PANCREAS: Normal.    SPLEEN: Normal.    ADRENAL GLANDS: Normal.    KIDNEYS/BLADDER: No hydronephrosis, hydroureter, or  urinary tract calculus. No contour deforming renal lesions. Urinary bladder unremarkable.    BOWEL: No bowel obstruction or inflammatory change. Normal appendix.    LYMPH NODES: Normal.    VASCULATURE: Unremarkable.    PELVIC ORGANS: Normal.    MUSCULOSKELETAL: There is hazy infiltration in the subcutaneous fat over the left flank (series 3, image 101). No destructive bone lesions.      Impression    IMPRESSION:   1.  No urinary tract calculi or hydronephrosis.  2.  Hazy infiltration of the subcutaneous fat over the left flank has an appearance of mild ecchymosis.     UA with Microscopic reflex to Culture    Specimen: Urine, Clean Catch   Result Value Ref Range    Color Urine Straw Colorless, Straw, Light Yellow, Yellow    Appearance Urine Clear Clear    Glucose Urine Negative Negative mg/dL    Bilirubin Urine Negative Negative    Ketones Urine Negative Negative mg/dL    Specific Gravity Urine 1.005 1.003 - 1.035    Blood Urine Moderate (A) Negative    pH Urine 5.0 5.0 - 7.0    Protein Albumin Urine Negative Negative mg/dL    Urobilinogen Urine Normal Normal, 2.0 mg/dL    Nitrite Urine Negative Negative    Leukocyte Esterase Urine Negative Negative    Bacteria Urine Few (A) None Seen /HPF    Mucus Urine Present (A) None Seen /LPF    RBC Urine 1 <=2 /HPF    WBC Urine 2 <=5 /HPF    Squamous Epithelials Urine 1 <=1 /HPF    Narrative    Urine Culture not indicated       Medications   0.9% sodium chloride BOLUS (0 mLs Intravenous Stopped 6/12/22 0813)     Followed by   sodium chloride 0.9% infusion (has no administration in time range)   ondansetron (ZOFRAN) injection 4 mg (4 mg Intravenous Given 6/12/22 0638)   ketorolac (TORADOL) injection 30 mg (30 mg Intravenous Given 6/12/22 0637)       Assessments & Plan (with Medical Decision Making)  52-year-old female woke suddenly at 5 AM this morning with cute onset of left flank pain that is quite severe.  Suspect ureteral calculi.  Zofran for nausea.  Toradol for pain.   Abdominal CT to look for stone.  CT shows no evidence of urinary calculi.  Some haziness in the subcutaneous tissue consistent with mild ecchymosis.  She does not recall falling or injuring herself.  She did sleep on the floor last night as they had grandkids over top.  The Toradol has helped with her pain.  She is much more comfortable and the spasm has settled down.  She has used Flexeril in the past for tension headaches but the prescription she has is old.  She does not like taking narcotic pain medication but I did send her with a limited prescription that she can use if needed for more severe pain if Tylenol/ibuprofen is ineffective.  I asked her to watch for any evidence of a rash to suggest shingles and if that were to develop she should be seen within 1-2 days to get started on antiviral medications.  She will follow-up in clinic if persistent problems and return to the ED if worse/concerns.  Verbal and written discharge instructions given.  She is comfortable with this plan.       I have reviewed the nursing notes.    I have reviewed the findings, diagnosis, plan and need for follow up with the patient.       New Prescriptions    CYCLOBENZAPRINE (FLEXERIL) 10 MG TABLET    Take 1 tablet (10 mg) by mouth 3 times daily as needed for muscle spasms    OXYCODONE (ROXICODONE) 5 MG TABLET    Take 1 tablet (5 mg) by mouth every 4 hours as needed for severe pain       Final diagnoses:   Acute left flank pain   Back muscle spasm       6/12/2022   [facility] EMERGENCY DEPT     Gerald Cook MD  06/12/22 0851

## 2022-10-06 ENCOUNTER — VIRTUAL VISIT (OUTPATIENT)
Dept: FAMILY MEDICINE | Facility: CLINIC | Age: 52
End: 2022-10-06
Payer: COMMERCIAL

## 2022-10-06 DIAGNOSIS — K21.9 GASTROESOPHAGEAL REFLUX DISEASE WITHOUT ESOPHAGITIS: ICD-10-CM

## 2022-10-06 DIAGNOSIS — F41.1 GAD (GENERALIZED ANXIETY DISORDER): ICD-10-CM

## 2022-10-06 DIAGNOSIS — F33.1 MAJOR DEPRESSIVE DISORDER, RECURRENT EPISODE, MODERATE (H): ICD-10-CM

## 2022-10-06 DIAGNOSIS — G43.711 INTRACTABLE CHRONIC MIGRAINE WITHOUT AURA AND WITH STATUS MIGRAINOSUS: ICD-10-CM

## 2022-10-06 DIAGNOSIS — Z12.11 SCREEN FOR COLON CANCER: ICD-10-CM

## 2022-10-06 DIAGNOSIS — I10 HTN, GOAL BELOW 140/90: ICD-10-CM

## 2022-10-06 DIAGNOSIS — Z12.31 VISIT FOR SCREENING MAMMOGRAM: ICD-10-CM

## 2022-10-06 PROCEDURE — 99214 OFFICE O/P EST MOD 30 MIN: CPT | Mod: TEL | Performed by: NURSE PRACTITIONER

## 2022-10-06 RX ORDER — HYDROCHLOROTHIAZIDE 25 MG/1
25 TABLET ORAL DAILY
Qty: 90 TABLET | Refills: 3 | Status: SHIPPED | OUTPATIENT
Start: 2022-10-06 | End: 2024-02-21

## 2022-10-06 RX ORDER — BUPROPION HYDROCHLORIDE 150 MG/1
150 TABLET, EXTENDED RELEASE ORAL 2 TIMES DAILY
Qty: 180 TABLET | Refills: 3 | Status: SHIPPED | OUTPATIENT
Start: 2022-10-06 | End: 2024-02-21

## 2022-10-06 RX ORDER — LOSARTAN POTASSIUM 100 MG/1
100 TABLET ORAL DAILY
Qty: 90 TABLET | Refills: 3 | Status: SHIPPED | OUTPATIENT
Start: 2022-10-06 | End: 2024-02-21

## 2022-10-06 RX ORDER — RIZATRIPTAN BENZOATE 5 MG/1
5-10 TABLET ORAL
Qty: 18 TABLET | Refills: 1 | Status: SHIPPED | OUTPATIENT
Start: 2022-10-06 | End: 2024-04-01

## 2022-10-06 NOTE — PROGRESS NOTES
Maranda is a 52 year old who is being evaluated via a billable telephone visit.      What phone number would you like to be contacted at? [phone]  How would you like to obtain your AVS? Mail a copy    Assessment & Plan     Screen for colon cancer    - COLOGGENEVIEVERD([employer])    Visit for screening mammogram    - MA SCREENING DIGITAL BILAT - Future  (s+30); Future    Major depressive disorder, recurrent episode, moderate (H)    - buPROPion (WELLBUTRIN SR) 150 MG 12 hr tablet; Take 1 tablet (150 mg) by mouth 2 times daily    SILVIA (generalized anxiety disorder)    - buPROPion (WELLBUTRIN SR) 150 MG 12 hr tablet; Take 1 tablet (150 mg) by mouth 2 times daily    HTN, goal below 140/90    - hydrochlorothiazide (HYDRODIURIL) 25 MG tablet; Take 1 tablet (25 mg) by mouth daily  - losartan (COZAAR) 100 MG tablet; Take 1 tablet (100 mg) by mouth daily    Intractable chronic migraine without aura and with status migrainosus    - hydrochlorothiazide (HYDRODIURIL) 25 MG tablet; Take 1 tablet (25 mg) by mouth daily  - losartan (COZAAR) 100 MG tablet; Take 1 tablet (100 mg) by mouth daily  - rizatriptan (MAXALT) 5 MG tablet; Take 1-2 tablets (5-10 mg) by mouth at onset of headache for migraine May repeat in 2 hours. Max 6 tablets/24 hours.    Gastroesophageal reflux disease without esophagitis    - omeprazole (PRILOSEC) 20 MG DR capsule; TAKE ONE CAPSULE BY MOUTH ONCE DAILY    30 minutes spent on the date of the encounter doing chart review, history and exam, documentation and further activities per the note     See Patient Instructions: Advised follow-up if symptoms worsen or if she has other concerns.  Monitor your blood pressure.  Complete Cologuard colon cancer screening test when it sent to your home.  Consider scheduling mammogram.  Patient in agreement      Return in about 1 year (around 10/6/2023), or if symptoms worsen or fail to improve.    KATINA FREGOSO  [facility] MEHNAZ Castaneda    Maranda is a 52 year old, presenting for the following health issues:  Recheck Medication    She reports she has a blood pressure cuff but she does not feel that it is accurate.  She takes her medication as prescribed but she did run out of her blood pressure medicine.  She was at the doctor approximately 2.5 months ago and her blood pressure was checked and it was in the normal range of less than 140/80.  She denies frequent headaches or chest pain.  She reports the Wellbutrin helps her anxiety, she has extreme anxiety related to driving.  She takes Prilosec for her heartburn and that helps as well.  She has rare migraines but Maxalt helps when she takes that as needed.  She is interested in doing a Cologuard colon cancer test and mammogram.  Labs were completed 6/12/2022 and were normal.    HPI     Hypertension Follow-up      Do you check your blood pressure regularly outside of the clinic? No     Are you following a low salt diet? No    Are your blood pressures ever more than 140 on the top number (systolic) OR more   than 90 on the bottom number (diastolic), for example 140/90? na        Review of Systems   Constitutional, HEENT, cardiovascular, pulmonary, GI, , musculoskeletal, neuro, skin, endocrine and psych systems are negative, except as otherwise noted.      Objective           Vitals:  No vitals were obtained today due to virtual visit.    Physical Exam   healthy, alert and no distress  PSYCH: Alert and oriented times 3; coherent speech, normal   rate and volume, able to articulate logical thoughts, able   to abstract reason, no tangential thoughts, no hallucinations   or delusions  Her affect is normal  RESP: No cough, no audible wheezing, able to talk in full sentences  Remainder of exam unable to be completed due to telephone visits    See orders          Phone call duration: 12 minutes

## 2023-06-21 ENCOUNTER — TELEPHONE (OUTPATIENT)
Dept: FAMILY MEDICINE | Facility: CLINIC | Age: 53
End: 2023-06-21
Payer: COMMERCIAL

## 2023-06-21 ASSESSMENT — PATIENT HEALTH QUESTIONNAIRE - PHQ9: SUM OF ALL RESPONSES TO PHQ QUESTIONS 1-9: 6

## 2023-06-21 NOTE — TELEPHONE ENCOUNTER
Patient Quality Outreach    Patient is due for the following:   Colon Cancer Screening  Depression  -  PHQ-9 needed  Physical Preventive Adult Physical      Topic Date Due     Hepatitis B Vaccine (1 of 3 - 3-dose series) Never done     COVID-19 Vaccine (1) Never done     Zoster (Shingles) Vaccine (1 of 2) Never done       Next Steps:   Schedule a Adult Preventative    Type of outreach:    Phone, spoke to patient/parent. PHQ9 completed. Patient will call and schedule physical with the scheduling department.       Questions for provider review:    None           Amber Peterson MA

## 2024-01-23 ENCOUNTER — OFFICE VISIT (OUTPATIENT)
Dept: FAMILY MEDICINE | Facility: CLINIC | Age: 54
End: 2024-01-23
Payer: COMMERCIAL

## 2024-01-23 VITALS
WEIGHT: 126.3 LBS | HEART RATE: 84 BPM | BODY MASS INDEX: 23.85 KG/M2 | OXYGEN SATURATION: 98 % | RESPIRATION RATE: 16 BRPM | SYSTOLIC BLOOD PRESSURE: 138 MMHG | DIASTOLIC BLOOD PRESSURE: 88 MMHG | TEMPERATURE: 97.4 F | HEIGHT: 61 IN

## 2024-01-23 DIAGNOSIS — G43.711 INTRACTABLE CHRONIC MIGRAINE WITHOUT AURA AND WITH STATUS MIGRAINOSUS: ICD-10-CM

## 2024-01-23 DIAGNOSIS — F41.1 GAD (GENERALIZED ANXIETY DISORDER): Primary | ICD-10-CM

## 2024-01-23 DIAGNOSIS — K21.9 GASTROESOPHAGEAL REFLUX DISEASE WITHOUT ESOPHAGITIS: ICD-10-CM

## 2024-01-23 DIAGNOSIS — J01.10 ACUTE NON-RECURRENT FRONTAL SINUSITIS: ICD-10-CM

## 2024-01-23 DIAGNOSIS — I10 HTN, GOAL BELOW 140/90: ICD-10-CM

## 2024-01-23 DIAGNOSIS — F33.1 MAJOR DEPRESSIVE DISORDER, RECURRENT EPISODE, MODERATE (H): ICD-10-CM

## 2024-01-23 PROCEDURE — 99215 OFFICE O/P EST HI 40 MIN: CPT | Performed by: NURSE PRACTITIONER

## 2024-01-23 RX ORDER — AZITHROMYCIN 250 MG/1
TABLET, FILM COATED ORAL
Qty: 6 TABLET | Refills: 0 | Status: SHIPPED | OUTPATIENT
Start: 2024-01-23 | End: 2024-01-28

## 2024-01-23 ASSESSMENT — PATIENT HEALTH QUESTIONNAIRE - PHQ9
SUM OF ALL RESPONSES TO PHQ QUESTIONS 1-9: 14
10. IF YOU CHECKED OFF ANY PROBLEMS, HOW DIFFICULT HAVE THESE PROBLEMS MADE IT FOR YOU TO DO YOUR WORK, TAKE CARE OF THINGS AT HOME, OR GET ALONG WITH OTHER PEOPLE: SOMEWHAT DIFFICULT
SUM OF ALL RESPONSES TO PHQ QUESTIONS 1-9: 14

## 2024-01-23 ASSESSMENT — PAIN SCALES - GENERAL: PAINLEVEL: NO PAIN (0)
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
## 2024-01-26 ENCOUNTER — TELEPHONE (OUTPATIENT)
Dept: FAMILY MEDICINE | Facility: CLINIC | Age: 54
End: 2024-01-26
Payer: COMMERCIAL

## 2024-01-26 NOTE — LETTER
January 26, 2024    Maranda Figueroa  [address] [facility] [address]    Your team at [facility] cares about your health. We have reviewed your chart and based on our findings; we are making the following recommendations to better manage your health.     You are in particular need of attention regarding the following:     Schedule Annual MAMMOGRAPHY. [facility] scheduling number is [phone] or schedule in [facility] (self referral).  Call or MyChart message your clinic to schedule a colonoscopy, schedule/ a FIT Test, or order a Cologuard test. If you are unsure what type of test you need, please call your clinic and speak to clinic staff.   Colon cancer is now the second leading cause of cancer-related deaths in the [place] for both men and women and there are over 130,000 new cases and 50,000 deaths per year from colon cancer. Colonoscopies can prevent 90-95% of these deaths. Problem lesions can be removed before they ever become cancer. This test is not only looking for cancer, but also getting rid of precancerous lesions.   Please schedule a Nurse Only Appointment with your primary care clinic to update your immunizations that are due.  PREVENTATIVE VISIT: Physical    If you have already completed these items, please contact the clinic via phone or   [facility] so your care team can review and update your records. Thank you for   choosing [facility] for your healthcare needs. For any questions,   concerns, or to schedule an appointment please contact our clinic.    Healthy Regards,      Your [facility] Care Team

## 2024-01-26 NOTE — TELEPHONE ENCOUNTER
Patient Quality Outreach    Patient is due for the following:   Colon Cancer Screening  Breast Cancer Screening - Mammogram  Physical Preventive Adult Physical      Topic Date Due    Hepatitis B Vaccine (1 of 3 - 3-dose series) Never done    COVID-19 Vaccine (1) Never done    Zoster (Shingles) Vaccine (1 of 2) Never done    Flu Vaccine (1) Never done    Diptheria Tetanus Pertussis (DTAP/TDAP/TD) Vaccine (2 - Td or Tdap) 11/19/2023         Type of outreach:    Sent letter.      Questions for provider review:    None           Amanda Watkins MA  Chart routed to Care Team.

## 2024-02-21 DIAGNOSIS — F33.1 MAJOR DEPRESSIVE DISORDER, RECURRENT EPISODE, MODERATE (H): ICD-10-CM

## 2024-02-21 DIAGNOSIS — I10 HTN, GOAL BELOW 140/90: ICD-10-CM

## 2024-02-21 DIAGNOSIS — G43.711 INTRACTABLE CHRONIC MIGRAINE WITHOUT AURA AND WITH STATUS MIGRAINOSUS: ICD-10-CM

## 2024-02-21 DIAGNOSIS — F41.1 GAD (GENERALIZED ANXIETY DISORDER): ICD-10-CM

## 2024-02-21 DIAGNOSIS — K21.9 GASTROESOPHAGEAL REFLUX DISEASE WITHOUT ESOPHAGITIS: ICD-10-CM

## 2024-02-21 RX ORDER — HYDROCHLOROTHIAZIDE 25 MG/1
25 TABLET ORAL DAILY
Qty: 30 TABLET | Refills: 0 | Status: SHIPPED | OUTPATIENT
Start: 2024-02-21 | End: 2024-04-01

## 2024-02-21 RX ORDER — BUPROPION HYDROCHLORIDE 150 MG/1
150 TABLET, EXTENDED RELEASE ORAL 2 TIMES DAILY
Qty: 60 TABLET | Refills: 0 | Status: SHIPPED | OUTPATIENT
Start: 2024-02-21 | End: 2024-04-01

## 2024-02-21 RX ORDER — LOSARTAN POTASSIUM 100 MG/1
100 TABLET ORAL DAILY
Qty: 30 TABLET | Refills: 0 | Status: SHIPPED | OUTPATIENT
Start: 2024-02-21 | End: 2024-03-21

## 2024-03-21 DIAGNOSIS — G43.711 INTRACTABLE CHRONIC MIGRAINE WITHOUT AURA AND WITH STATUS MIGRAINOSUS: ICD-10-CM

## 2024-03-21 DIAGNOSIS — I10 HTN, GOAL BELOW 140/90: ICD-10-CM

## 2024-03-21 RX ORDER — LOSARTAN POTASSIUM 100 MG/1
100 TABLET ORAL DAILY
Qty: 30 TABLET | Refills: 0 | Status: SHIPPED | OUTPATIENT
Start: 2024-03-21 | End: 2024-04-01

## 2024-04-01 ENCOUNTER — VIRTUAL VISIT (OUTPATIENT)
Dept: FAMILY MEDICINE | Facility: CLINIC | Age: 54
End: 2024-04-01
Payer: COMMERCIAL

## 2024-04-01 DIAGNOSIS — F33.1 MAJOR DEPRESSIVE DISORDER, RECURRENT EPISODE, MODERATE (H): ICD-10-CM

## 2024-04-01 DIAGNOSIS — Z13.220 LIPID SCREENING: ICD-10-CM

## 2024-04-01 DIAGNOSIS — Z12.11 SCREEN FOR COLON CANCER: Primary | ICD-10-CM

## 2024-04-01 DIAGNOSIS — F41.1 GAD (GENERALIZED ANXIETY DISORDER): ICD-10-CM

## 2024-04-01 DIAGNOSIS — G43.711 INTRACTABLE CHRONIC MIGRAINE WITHOUT AURA AND WITH STATUS MIGRAINOSUS: ICD-10-CM

## 2024-04-01 DIAGNOSIS — Z76.89 ENCOUNTER TO ESTABLISH CARE: ICD-10-CM

## 2024-04-01 DIAGNOSIS — I10 HTN, GOAL BELOW 140/90: ICD-10-CM

## 2024-04-01 DIAGNOSIS — K21.9 GASTROESOPHAGEAL REFLUX DISEASE WITHOUT ESOPHAGITIS: ICD-10-CM

## 2024-04-01 DIAGNOSIS — Z12.31 VISIT FOR SCREENING MAMMOGRAM: ICD-10-CM

## 2024-04-01 PROCEDURE — 99443 PR PHYSICIAN TELEPHONE EVALUATION 21-30 MIN: CPT | Mod: 93 | Performed by: NURSE PRACTITIONER

## 2024-04-01 RX ORDER — HYDROCHLOROTHIAZIDE 25 MG/1
25 TABLET ORAL DAILY
Qty: 90 TABLET | Refills: 1 | Status: SHIPPED | OUTPATIENT
Start: 2024-04-01

## 2024-04-01 RX ORDER — LOSARTAN POTASSIUM 100 MG/1
100 TABLET ORAL DAILY
Qty: 90 TABLET | Refills: 1 | Status: SHIPPED | OUTPATIENT
Start: 2024-04-01

## 2024-04-01 RX ORDER — BUPROPION HYDROCHLORIDE 150 MG/1
150 TABLET, EXTENDED RELEASE ORAL 2 TIMES DAILY
Qty: 180 TABLET | Refills: 1 | Status: SHIPPED | OUTPATIENT
Start: 2024-04-01

## 2024-04-01 RX ORDER — RIZATRIPTAN BENZOATE 5 MG/1
5-10 TABLET ORAL
Qty: 18 TABLET | Refills: 1 | Status: SHIPPED | OUTPATIENT
Start: 2024-04-01

## 2024-04-01 ASSESSMENT — PATIENT HEALTH QUESTIONNAIRE - PHQ9
SUM OF ALL RESPONSES TO PHQ QUESTIONS 1-9: 7
SUM OF ALL RESPONSES TO PHQ QUESTIONS 1-9: 7
10. IF YOU CHECKED OFF ANY PROBLEMS, HOW DIFFICULT HAVE THESE PROBLEMS MADE IT FOR YOU TO DO YOUR WORK, TAKE CARE OF THINGS AT HOME, OR GET ALONG WITH OTHER PEOPLE: NOT DIFFICULT AT ALL

## 2024-04-01 ASSESSMENT — ANXIETY QUESTIONNAIRES
3. WORRYING TOO MUCH ABOUT DIFFERENT THINGS: NEARLY EVERY DAY
6. BECOMING EASILY ANNOYED OR IRRITABLE: SEVERAL DAYS
GAD7 TOTAL SCORE: 16
7. FEELING AFRAID AS IF SOMETHING AWFUL MIGHT HAPPEN: NOT AT ALL
1. FEELING NERVOUS, ANXIOUS, OR ON EDGE: NEARLY EVERY DAY
2. NOT BEING ABLE TO STOP OR CONTROL WORRYING: NEARLY EVERY DAY
GAD7 TOTAL SCORE: 16
5. BEING SO RESTLESS THAT IT IS HARD TO SIT STILL: NEARLY EVERY DAY
7. FEELING AFRAID AS IF SOMETHING AWFUL MIGHT HAPPEN: NOT AT ALL
4. TROUBLE RELAXING: NEARLY EVERY DAY
GAD7 TOTAL SCORE: 16
IF YOU CHECKED OFF ANY PROBLEMS ON THIS QUESTIONNAIRE, HOW DIFFICULT HAVE THESE PROBLEMS MADE IT FOR YOU TO DO YOUR WORK, TAKE CARE OF THINGS AT HOME, OR GET ALONG WITH OTHER PEOPLE: NOT DIFFICULT AT ALL
8. IF YOU CHECKED OFF ANY PROBLEMS, HOW DIFFICULT HAVE THESE MADE IT FOR YOU TO DO YOUR WORK, TAKE CARE OF THINGS AT HOME, OR GET ALONG WITH OTHER PEOPLE?: NOT DIFFICULT AT ALL

## 2024-04-01 ASSESSMENT — ENCOUNTER SYMPTOMS
HEADACHES: 1
NERVOUS/ANXIOUS: 1

## 2024-04-01 NOTE — PROGRESS NOTES
"    Instructions Relayed to Patient by Virtual Roomer:     If patient is not active on [facility] [facility]:  Relayed the following to patient: \"Would you like us to text or email you a link to join your appointment now or when your provider is ready to initiate the virtual visit?\"    Reminded patient to ensure they were logged on to virtual visit by arrival time listed. Documented in appointment notes if patient had flexibility to initiate visit sooner than arrival time. If pediatric virtual visit, ensured pediatric patient along with parent/guardian will be present for video visit.     Patient offered the website [email or web address] and/or phone number to [employer] [facility] Help line: [phone]      Maranda is a 53 year old who is being evaluated via a billable telephone visit.    What phone number would you like to be contacted at?     [phone]     How would you like to obtain your AVS? Mail a copy  Originating Location (pt. Location): Home    Distant Location (provider location):  On-site    Assessment & Plan     Encounter to establish care  Chart reviewed- has recent in person visit with another provider    Major depressive disorder, recurrent episode, moderate (H)  PHQ-9 is 7.  Has been on wellbutrin for 7 years- feels it works well for her- has tried others in the past- has been on meds for 20 years.  Will check TSH, CBC, ferritin, Vitamin D- has a history of low vitamin D but not currently on replacement.  Discussed good self care, sleep hygiene, mindfulness therapies and meditation.  - buPROPion (WELLBUTRIN SR) 150 MG 12 hr tablet; Take 1 tablet (150 mg) by mouth 2 times daily Need follow-up visit for further refills  - TSH with free T4 reflex; Future  - Ferritin; Future  - CBC with platelets and differential; Future  - Vitamin D Deficiency; Future  - [place] BEHAV ASSMT W/SCORE & DOCD/STAND INSTRUMENT    SILVIA (generalized anxiety disorder)  As above  - buPROPion (WELLBUTRIN SR) 150 MG 12 hr tablet; Take 1 tablet " (150 mg) by mouth 2 times daily Need follow-up visit for further refills  - TSH with free T4 reflex; Future  - Ferritin; Future  - CBC with platelets and differential; Future  - Vitamin D Deficiency; Future  - [place] BEHAV ASSMT W/SCORE & DOCD/STAND INSTRUMENT    Intractable chronic migraine without aura and with status migrainosus  Uses sparingly  - rizatriptan (MAXALT) 5 MG tablet; Take 1-2 tablets (5-10 mg) by mouth at onset of headache for migraine May repeat in 2 hours. Max 6 tablets/24 hours.    HTN, goal below 140/90  Controlled per patient.  Borderline at last visit.  Due for labs- future orders placed  - losartan (COZAAR) 100 MG tablet; Take 1 tablet (100 mg) by mouth daily Last refill without visit  - hydrochlorothiazide (HYDRODIURIL) 25 MG tablet; Take 1 tablet (25 mg) by mouth daily Need follow-up visit for further refills has not been seen since 2022    Gastroesophageal reflux disease without esophagitis  Takes prn  - omeprazole (PRILOSEC) 20 MG DR capsule; TAKE ONE CAPSULE BY MOUTH ONCE DAILY     Visit for screening mammogram  screen  - MA Screen Bilateral w/Wade; Future    Lipid screening  Screen future labs  - Lipid panel reflex to direct LDL Fasting; Future    Screen for colon cancer  Screen- declines colonoscopy  - Fecal colorectal cancer screen (FIT); Future      32 minutes spent by me on the date of the encounter doing chart review, review of outside records, review of test results, interpretation of tests, patient visit, and documentation       Tonya Low is a 53 year old, presenting for the following health issues:  Hypertension, Headache, Depression, and Anxiety      4/1/2024     7:44 AM   Additional Questions   Roomed by Rani     History of Present Illness       Mental Health Follow-up:  Patient presents to follow-up on Depression & Anxiety.Patient's depression since last visit has been:  No change  The patient is having other symptoms associated with depression.  Patient's anxiety  "since last visit has been:  No change  The patient is having other symptoms associated with anxiety.  Any significant life events: other  Patient is feeling anxious or having panic attacks.  Patient has no concerns about alcohol or drug use.    Hypertension: She presents for follow up of hypertension.  She does not check blood pressure  regularly outside of the clinic. Outpatient blood pressures have not been over 140/90. She does not follow a low salt diet.     Headaches:   Since the patient's last clinic visit, headaches are: no change  The patient is getting headaches:  Intermittently  She is not able to do normal daily activities when she has a migraine.  The patient is taking the following rescue/relief medications:  Maxalt   Patient states \"I get total relief\" from the rescue/relief medications.   The patient is taking the following medications to prevent migraines:  Other  In the past 4 weeks, the patient has gone to an Urgent Care or Emergency Room 0 times times due to headaches.    She eats 2-3 servings of fruits and vegetables daily.She consumes 1 sweetened beverage(s) daily.She exercises with enough effort to increase her heart rate 20 to 29 minutes per day.  She exercises with enough effort to increase her heart rate 3 or less days per week.   She is taking medications regularly.       Encounter to establish care  Anxiety- effexor, paxil, been on meds for 20 years.  Wellbutrin seems to work well for her.  Has anxiety at work. Has been on wellbutrin for 7 years.  History of low D in the past- not currently on D    Migraine- uses triptan sparingly    HTN- on losartan and hydrochlorothiazide.  Needs lab check          Review of Systems  Constitutional, HEENT, cardiovascular, pulmonary, GI, , musculoskeletal, neuro, skin, endocrine and psych systems are negative, except as otherwise noted.      Objective           Vitals:  No vitals were obtained today due to virtual visit.    Physical Exam   General: " Alert and no distress //Respiratory: No audible wheeze, cough, or shortness of breath // Psychiatric:  Appropriate affect, tone, and pace of words            Phone call duration: 12 minutes  Signed Electronically by: Caity Villanueva NP

## 2024-08-08 DIAGNOSIS — K21.9 GASTROESOPHAGEAL REFLUX DISEASE WITHOUT ESOPHAGITIS: ICD-10-CM

## 2024-08-08 NOTE — TELEPHONE ENCOUNTER
Clinic RN: Please contact patient because patient should have run out of this medication on 4/30/24. Confirm patient is taking this medication as prescribed. Document findings and route refill encounter to provider for approval or denial.    Lilia Guo, MEHRDADN, RN

## 2024-08-09 NOTE — TELEPHONE ENCOUNTER
RN TRIAGE CALL:    Patient Contact    Attempt # 1    Was call answered?  No.  Left message on voicemail with information to call me back.    MEHRDAD BradshawN, RN

## 2024-08-12 NOTE — TELEPHONE ENCOUNTER
RN Triage    Patient Contact    Attempt # 2    Was call answered?  No.  Left message on voicemail with information to call me back.      MERCEDEZ Bertrand, RN

## 2024-08-13 NOTE — TELEPHONE ENCOUNTER
RN TRIAGE CALL:    Patient Contact    Attempt # 3    Was call answered?  No.  Left message on voicemail with information to call me back.    Third attempt to call patient. We have not been able to reach patient regarding gap in medication. Routing to PCP for review of refill request.     MEHRDAD BradshawN, RN

## 2024-09-16 ENCOUNTER — HOSPITAL ENCOUNTER (EMERGENCY)
Facility: CLINIC | Age: 54
Discharge: HOME | End: 2024-09-16
Attending: NURSE PRACTITIONER | Admitting: NURSE PRACTITIONER
Payer: COMMERCIAL

## 2024-09-16 ENCOUNTER — APPOINTMENT (OUTPATIENT)
Dept: CT IMAGING | Facility: CLINIC | Age: 54
End: 2024-09-16
Attending: NURSE PRACTITIONER
Payer: COMMERCIAL

## 2024-09-16 VITALS
HEIGHT: 62 IN | SYSTOLIC BLOOD PRESSURE: 134 MMHG | BODY MASS INDEX: 22.82 KG/M2 | WEIGHT: 124 LBS | HEART RATE: 87 BPM | OXYGEN SATURATION: 98 % | TEMPERATURE: 97.4 F | DIASTOLIC BLOOD PRESSURE: 103 MMHG | RESPIRATION RATE: 18 BRPM

## 2024-09-16 DIAGNOSIS — R79.89 ELEVATED LIVER FUNCTION TESTS: ICD-10-CM

## 2024-09-16 DIAGNOSIS — K29.20 ACUTE ALCOHOLIC GASTRITIS WITHOUT HEMORRHAGE: ICD-10-CM

## 2024-09-16 DIAGNOSIS — H53.2 DIPLOPIA: ICD-10-CM

## 2024-09-16 DIAGNOSIS — D69.6 THROMBOCYTOPENIA (H): ICD-10-CM

## 2024-09-16 DIAGNOSIS — F10.10 ALCOHOL ABUSE: ICD-10-CM

## 2024-09-16 LAB
ALBUMIN SERPL BCG-MCNC: 4.6 G/DL (ref 3.5–5.2)
ALBUMIN UR-MCNC: NEGATIVE MG/DL
ALP SERPL-CCNC: 93 U/L (ref 40–150)
ALT SERPL W P-5'-P-CCNC: 130 U/L (ref 0–50)
ANION GAP SERPL CALCULATED.3IONS-SCNC: 15 MMOL/L (ref 7–15)
APPEARANCE UR: CLEAR
AST SERPL W P-5'-P-CCNC: 271 U/L (ref 0–45)
BASOPHILS # BLD AUTO: 0.1 10E3/UL (ref 0–0.2)
BASOPHILS NFR BLD AUTO: 1 %
BILIRUB SERPL-MCNC: 0.9 MG/DL
BILIRUB UR QL STRIP: NEGATIVE
BUN SERPL-MCNC: 11.6 MG/DL (ref 6–20)
CALCIUM SERPL-MCNC: 9.4 MG/DL (ref 8.8–10.4)
CHLORIDE SERPL-SCNC: 105 MMOL/L (ref 98–107)
COLOR UR AUTO: YELLOW
CREAT SERPL-MCNC: 0.73 MG/DL (ref 0.51–0.95)
EGFRCR SERPLBLD CKD-EPI 2021: >90 ML/MIN/1.73M2
EOSINOPHIL # BLD AUTO: 0.1 10E3/UL (ref 0–0.7)
EOSINOPHIL NFR BLD AUTO: 3 %
ERYTHROCYTE [DISTWIDTH] IN BLOOD BY AUTOMATED COUNT: 12.3 % (ref 10–15)
GLUCOSE SERPL-MCNC: 91 MG/DL (ref 70–99)
GLUCOSE UR STRIP-MCNC: NEGATIVE MG/DL
HCO3 SERPL-SCNC: 23 MMOL/L (ref 22–29)
HCT VFR BLD AUTO: 36.4 % (ref 35–47)
HGB BLD-MCNC: 12.6 G/DL (ref 11.7–15.7)
HGB UR QL STRIP: NEGATIVE
IMM GRANULOCYTES # BLD: 0 10E3/UL
IMM GRANULOCYTES NFR BLD: 0 %
KETONES UR STRIP-MCNC: NEGATIVE MG/DL
LEUKOCYTE ESTERASE UR QL STRIP: NEGATIVE
LIPASE SERPL-CCNC: 57 U/L (ref 13–60)
LYMPHOCYTES # BLD AUTO: 1.3 10E3/UL (ref 0.8–5.3)
LYMPHOCYTES NFR BLD AUTO: 31 %
MCH RBC QN AUTO: 34.8 PG (ref 26.5–33)
MCHC RBC AUTO-ENTMCNC: 34.6 G/DL (ref 31.5–36.5)
MCV RBC AUTO: 101 FL (ref 78–100)
MONOCYTES # BLD AUTO: 0.6 10E3/UL (ref 0–1.3)
MONOCYTES NFR BLD AUTO: 15 %
NEUTROPHILS # BLD AUTO: 2.2 10E3/UL (ref 1.6–8.3)
NEUTROPHILS NFR BLD AUTO: 50 %
NITRATE UR QL: NEGATIVE
NRBC # BLD AUTO: 0 10E3/UL
NRBC BLD AUTO-RTO: 0 /100
PH UR STRIP: 5 [PH] (ref 5–7)
PLATELET # BLD AUTO: 118 10E3/UL (ref 150–450)
POTASSIUM SERPL-SCNC: 4.3 MMOL/L (ref 3.4–5.3)
PROT SERPL-MCNC: 7.2 G/DL (ref 6.4–8.3)
RBC # BLD AUTO: 3.62 10E6/UL (ref 3.8–5.2)
RBC URINE: <1 /HPF
SODIUM SERPL-SCNC: 143 MMOL/L (ref 135–145)
SP GR UR STRIP: 1.03 (ref 1–1.03)
SQUAMOUS EPITHELIAL: 9 /HPF
UROBILINOGEN UR STRIP-MCNC: NORMAL MG/DL
WBC # BLD AUTO: 4.3 10E3/UL (ref 4–11)
WBC URINE: <1 /HPF

## 2024-09-16 PROCEDURE — 96361 HYDRATE IV INFUSION ADD-ON: CPT | Performed by: NURSE PRACTITIONER

## 2024-09-16 PROCEDURE — 99285 EMERGENCY DEPT VISIT HI MDM: CPT | Mod: 25 | Performed by: NURSE PRACTITIONER

## 2024-09-16 PROCEDURE — 74177 CT ABD & PELVIS W/CONTRAST: CPT

## 2024-09-16 PROCEDURE — 83690 ASSAY OF LIPASE: CPT | Performed by: NURSE PRACTITIONER

## 2024-09-16 PROCEDURE — 85025 COMPLETE CBC W/AUTO DIFF WBC: CPT | Performed by: NURSE PRACTITIONER

## 2024-09-16 PROCEDURE — 99284 EMERGENCY DEPT VISIT MOD MDM: CPT | Mod: 25 | Performed by: NURSE PRACTITIONER

## 2024-09-16 PROCEDURE — 250N000011 HC RX IP 250 OP 636: Performed by: NURSE PRACTITIONER

## 2024-09-16 PROCEDURE — 258N000003 HC RX IP 258 OP 636: Performed by: NURSE PRACTITIONER

## 2024-09-16 PROCEDURE — 36415 COLL VENOUS BLD VENIPUNCTURE: CPT | Performed by: NURSE PRACTITIONER

## 2024-09-16 PROCEDURE — 250N000009 HC RX 250: Performed by: NURSE PRACTITIONER

## 2024-09-16 PROCEDURE — 81003 URINALYSIS AUTO W/O SCOPE: CPT | Performed by: NURSE PRACTITIONER

## 2024-09-16 PROCEDURE — 80053 COMPREHEN METABOLIC PANEL: CPT | Performed by: NURSE PRACTITIONER

## 2024-09-16 PROCEDURE — 96360 HYDRATION IV INFUSION INIT: CPT | Performed by: NURSE PRACTITIONER

## 2024-09-16 RX ORDER — IOPAMIDOL 755 MG/ML
500 INJECTION, SOLUTION INTRAVASCULAR ONCE
Status: COMPLETED | OUTPATIENT
Start: 2024-09-16 | End: 2024-09-16

## 2024-09-16 RX ORDER — OMEPRAZOLE 40 MG/1
40 CAPSULE, DELAYED RELEASE ORAL DAILY
Qty: 30 CAPSULE | Refills: 0 | Status: SHIPPED | OUTPATIENT
Start: 2024-09-16

## 2024-09-16 RX ORDER — FAMOTIDINE 40 MG/1
40 TABLET, FILM COATED ORAL AT BEDTIME
Qty: 30 TABLET | Refills: 0 | Status: SHIPPED | OUTPATIENT
Start: 2024-09-16

## 2024-09-16 RX ADMIN — SODIUM CHLORIDE 60 ML: 9 INJECTION, SOLUTION INTRAVENOUS at 13:55

## 2024-09-16 RX ADMIN — SODIUM CHLORIDE 1000 ML: 9 INJECTION, SOLUTION INTRAVENOUS at 13:11

## 2024-09-16 RX ADMIN — IOPAMIDOL 60 ML: 755 INJECTION, SOLUTION INTRAVENOUS at 13:55

## 2024-09-16 ASSESSMENT — COLUMBIA-SUICIDE SEVERITY RATING SCALE - C-SSRS
1. IN THE PAST MONTH, HAVE YOU WISHED YOU WERE DEAD OR WISHED YOU COULD GO TO SLEEP AND NOT WAKE UP?: NO
6. HAVE YOU EVER DONE ANYTHING, STARTED TO DO ANYTHING, OR PREPARED TO DO ANYTHING TO END YOUR LIFE?: NO
2. HAVE YOU ACTUALLY HAD ANY THOUGHTS OF KILLING YOURSELF IN THE PAST MONTH?: NO

## 2024-09-16 ASSESSMENT — ACTIVITIES OF DAILY LIVING (ADL)
ADLS_ACUITY_SCORE: 33
ADLS_ACUITY_SCORE: 35

## 2024-09-16 NOTE — DISCHARGE INSTRUCTIONS
Omeprazole 40 mg daily in the morning on empty stomach.  Famotidine 40 mg at bedtime.  You should significantly decrease the amount of alcohol you drink.    Make an appointment for recheck in clinic in 1 to 2 weeks to have your liver function tests.     Regarding your intermittent double vision. This could be related to your migraines. I recommend a visit with ophthalmology for a thorough eye exam.  I have sent referral for this.  Someone should call you to set up the appointment.

## 2024-09-16 NOTE — ED PROVIDER NOTES
"  History     Chief Complaint   Patient presents with    Abdominal Pain    Eye Problem     HPI  Maranda Figueroa is a 54 year old female who presents for evaluation of upper abdominal pain that wraps around to her back.  This started 2 to 3 days ago.  Denies fever or chills.  Denies nausea or vomiting.  Denies constipation or diarrhea.  Denies black or bloody stools.  Does have decreased appetite.  Denies urinary symptoms.    She does not smoke cigarettes.  She smokes marijuana.  She drinks alcohol, states on a regular basis and says she does drink to excess.  Her last drink was last evening when she drank wine.  She says she typically does not drink wine but does drink \"a lot\".  Denies any prior abdominal surgeries.    Allergies:  Allergies   Allergen Reactions    Penicillins      Causes nausea with emesis       Problem List:    Patient Active Problem List    Diagnosis Date Noted    Intractable chronic migraine without aura and with status migrainosus 03/01/2018     Priority: Medium    Gastroesophageal reflux disease without esophagitis 04/20/2016     Priority: Medium    Panic attack 04/20/2016     Priority: Medium    Vitamin D deficiency 02/10/2016     Priority: Medium    HTN, goal below 140/90 10/07/2015     Priority: Medium    Major depression in complete remission (H) 11/19/2013     Priority: Medium    Hearing impairment 11/19/2013     Priority: Medium     Left ear      Insomnia 11/19/2013     Priority: Medium    Anxiety state 05/24/2006     Priority: Medium     Problem list name updated by automated process. Provider to review          Past Medical History:    Past Medical History:   Diagnosis Date    Anxiety state, unspecified     Depressive disorder, not elsewhere classified     Unspecified conductive hearing loss     Vitamin D deficiency 2/10/2016       Past Surgical History:    Past Surgical History:   Procedure Laterality Date    ESOPHAGOSCOPY, GASTROSCOPY, DUODENOSCOPY (EGD), COMBINED N/A 10/5/2015    " "Procedure: COMBINED ESOPHAGOSCOPY, GASTROSCOPY, DUODENOSCOPY (EGD), BIOPSY SINGLE OR MULTIPLE;  Surgeon: Calin Kenyon MD;  Location: [facility]    OPEN REDUCTION INTERNAL FIXATION RODDING INTRAMEDULLARY TIBIA  2011    Procedure:OPEN REDUCTION INTERNAL FIXATION RODDING INTRAMEDULLARY TIBIA; Open Reduction internal fixation rodding right tibia; Surgeon:JUNO TUTTLE; Location:[facility]    TYMPANOPLASTY, RT/LT      Tympanoplasty/LT       Family History:    Family History   Problem Relation Age of Onset    Diabetes Mother     Hypertension Mother     Thyroid Disease Mother     Cancer - colorectal Mother     Diabetes Father     Hypertension Father     Heart Disease Maternal Grandmother     Diabetes Maternal Grandfather        Social History:  Marital Status:   [2]  Social History     Tobacco Use    Smoking status: Former     Current packs/day: 0.00     Types: Cigarettes     Quit date: 1994     Years since quittin.3    Smokeless tobacco: Never   Vaping Use    Vaping status: Never Used   Substance Use Topics    Alcohol use: Yes     Alcohol/week: 1.7 standard drinks of alcohol     Comment: Occ.    Drug use: No        Medications:    famotidine (PEPCID) 40 MG tablet  omeprazole (PRILOSEC) 40 MG DR capsule  buPROPion (WELLBUTRIN SR) 150 MG 12 hr tablet  hydrochlorothiazide (HYDRODIURIL) 25 MG tablet  losartan (COZAAR) 100 MG tablet  omeprazole (PRILOSEC) 20 MG DR capsule  rizatriptan (MAXALT) 5 MG tablet          Review of Systems  As mentioned above in the history present illness. All other systems were reviewed and are negative.    Physical Exam   BP: (!) 151/109  Pulse: 95  Temp: 97.4  F (36.3  C)  Resp: 18  Height: 157.5 cm (5' 2\")  Weight: 56.2 kg (124 lb)  SpO2: 99 %      Physical Exam  Constitutional:       General: She is in acute distress.      Appearance: Normal appearance. She is not ill-appearing.   HENT:      Head: Normocephalic and atraumatic.      Right Ear: External ear normal.      Left " Ear: External ear normal.      Nose: Nose normal.      Mouth/Throat:      Mouth: Mucous membranes are moist.   Eyes:      Conjunctiva/sclera: Conjunctivae normal.   Cardiovascular:      Rate and Rhythm: Normal rate and regular rhythm.      Heart sounds: Normal heart sounds. No murmur heard.  Pulmonary:      Effort: Pulmonary effort is normal. No respiratory distress.      Breath sounds: Normal breath sounds.   Musculoskeletal:      Lumbar back: Spasms and tenderness present. Positive right straight leg raise test.        Back:    Skin:     General: Skin is warm and dry.      Findings: No rash.   Neurological:      General: No focal deficit present.      Mental Status: She is alert and oriented to person, place, and time.         ED Course        Procedures         Results for orders placed or performed during the hospital encounter of 09/16/24 (from the past 24 hour(s))   CBC with platelets differential    Narrative    The following orders were created for panel order CBC with platelets differential.  Procedure                               Abnormality         Status                     ---------                               -----------         ------                     CBC with platelets and d...[[id number]]  Abnormal            Final result                 Please view results for these tests on the individual orders.   Comprehensive metabolic panel   Result Value Ref Range    Sodium 143 135 - 145 mmol/L    Potassium 4.3 3.4 - 5.3 mmol/L    Carbon Dioxide (CO2) 23 22 - 29 mmol/L    Anion Gap 15 7 - 15 mmol/L    Urea Nitrogen 11.6 6.0 - 20.0 mg/dL    Creatinine 0.73 0.51 - 0.95 mg/dL    GFR Estimate >90 >60 mL/min/1.73m2    Calcium 9.4 8.8 - 10.4 mg/dL    Chloride 105 98 - 107 mmol/L    Glucose 91 70 - 99 mg/dL    Alkaline Phosphatase 93 40 - 150 U/L     (H) 0 - 45 U/L     (H) 0 - 50 U/L    Protein Total 7.2 6.4 - 8.3 g/dL    Albumin 4.6 3.5 - 5.2 g/dL    Bilirubin Total 0.9 <=1.2 mg/dL   Lipase    Result Value Ref Range    Lipase 57 13 - 60 U/L   CBC with platelets and differential   Result Value Ref Range    WBC Count 4.3 4.0 - 11.0 10e3/uL    RBC Count 3.62 (L) 3.80 - 5.20 10e6/uL    Hemoglobin 12.6 11.7 - 15.7 g/dL    Hematocrit 36.4 35.0 - 47.0 %     (H) 78 - 100 fL    MCH 34.8 (H) 26.5 - 33.0 pg    MCHC 34.6 31.5 - 36.5 g/dL    RDW 12.3 10.0 - 15.0 %    Platelet Count 118 (L) 150 - 450 10e3/uL    % Neutrophils 50 %    % Lymphocytes 31 %    % Monocytes 15 %    % Eosinophils 3 %    % Basophils 1 %    % Immature Granulocytes 0 %    NRBCs per 100 WBC 0 <1 /100    Absolute Neutrophils 2.2 1.6 - 8.3 10e3/uL    Absolute Lymphocytes 1.3 0.8 - 5.3 10e3/uL    Absolute Monocytes 0.6 0.0 - 1.3 10e3/uL    Absolute Eosinophils 0.1 0.0 - 0.7 10e3/uL    Absolute Basophils 0.1 0.0 - 0.2 10e3/uL    Absolute Immature Granulocytes 0.0 <=0.4 10e3/uL    Absolute NRBCs 0.0 10e3/uL   CT Abdomen Pelvis w Contrast    Narrative    CT ABDOMEN PELVIS W CONTRAST 9/16/2024 2:05 PM    CLINICAL HISTORY: upper abdominal pain, wraps around to back.    TECHNIQUE: CT scan of the abdomen and pelvis was performed following  injection of IV contrast. Multiplanar reformats were obtained. Dose  reduction techniques were used.  CONTRAST: 60mL, Isovue 370    COMPARISON: 6/12/2022    FINDINGS:   LOWER CHEST: Normal.    HEPATOBILIARY: Mild hepatic steatosis. A few tiny hypodense lesions in  the liver are too small to characterize, may be cysts and hemangiomas.    PANCREAS: Normal.    SPLEEN: Normal.    ADRENAL GLANDS: Normal.    KIDNEYS/BLADDER: Normal.    BOWEL: No small bowel or colonic obstruction or inflammatory changes.  Normal appendix.    PELVIC ORGANS: Tubal ligation clips. No pelvic masses.    ADDITIONAL FINDINGS: No lymphadenopathy. Major intra-abdominal  vasculature is patent. No free fluid or fluid collections. No free  air.    MUSCULOSKELETAL: Unremarkable.      Impression    IMPRESSION:   1.  No acute findings in the abdomen  and pelvis.    ROEL BUTLER MD         SYSTEM ID:  [id number]   UA with Microscopic reflex to Culture    Specimen: Urine, Clean Catch   Result Value Ref Range    Color Urine Yellow Colorless, Straw, Light Yellow, Yellow    Appearance Urine Clear Clear    Glucose Urine Negative Negative mg/dL    Bilirubin Urine Negative Negative    Ketones Urine Negative Negative mg/dL    Specific Gravity Urine 1.027 1.003 - 1.035    Blood Urine Negative Negative    pH Urine 5.0 5.0 - 7.0    Protein Albumin Urine Negative Negative mg/dL    Urobilinogen Urine Normal Normal, 2.0 mg/dL    Nitrite Urine Negative Negative    Leukocyte Esterase Urine Negative Negative    RBC Urine <1 <=2 /HPF    WBC Urine <1 <=5 /HPF    Squamous Epithelials Urine 9 (H) <=1 /HPF    Narrative    Urine Culture not indicated       Medications   sodium chloride 0.9% BOLUS 1,000 mL (0 mLs Intravenous Stopped 9/16/24 1514)   iopamidol (ISOVUE-370) solution 500 mL (60 mLs Intravenous $Given 9/16/24 1355)   sodium chloride 0.9 % bag 100mL for CT scan flush use (60 mLs Intravenous $Given 9/16/24 1355)       Assessments & Plan (with Medical Decision Making)     54 year old female who presents with acute epigastric abdominal pain likely secondary to alcoholic gastritis.  She uses alcohol on a daily basis and does admit to drinking in excess.  Additionally, patient mentions that she has been having double vision intermittently for the last 2 months.  This is always accompanied by headache and she does have history of migraines.  She is not currently having the symptoms.  I did wonder if maybe this was part of her migraine variant.   Lab findings reveal no leukocytosis.   Platelets 118, , and .  Likely related to her alcohol use.  Normal bilirubin.  Abdominal/pelvis CT reveals mild hepatic steatosis.  Otherwise no acute findings.  Abdominal exam without peritoneal signs. No evidence of acute abdomen at this time. Well appearing.   Given work up I have low  suspicion for acute cholecystitis or cholangitis, upper GI bleed, acute pancreatitis, or gastric perforation.  I have low suspicion for acute infectious processes, atypical appendicitis, vascular catastrophe, bowel obstruction or viscus perforation, or acute coronary syndrome.   Presentation not consistent with other acute, emergent causes of abdominal pain at this time.   Patient instructed to continue take omeprazole 40 mg daily on empty stomach and I provided her additional prescription for this.  I also recommend famotidine at bedtime and she was provided prescription for this.  She did have elevated blood pressure here and she had her blood pressure medication in her back pocket and had not taken it yet today.  She was recommended to take her blood pressure medication now.  I highly recommend she stop drinking alcohol or at minimum significantly decrease the amount she drinks.  Patient instructed to return for fever, vomiting, worsening pain, or worse in any way.    Patient instructed as follows:  Omeprazole 40 mg daily in the morning on empty stomach.  Famotidine 40 mg at bedtime.  You should significantly decrease the amount of alcohol you drink.    Make an appointment for recheck in clinic in 1 to 2 weeks to have your liver function tests.     Regarding your intermittent double vision. This could be related to your migraines. I recommend a visit with ophthalmology for a thorough eye exam.  I have sent referral for this.  Someone should call you to set up the appointment.    Discharge Medication List as of 9/16/2024  3:14 PM        START taking these medications    Details   famotidine (PEPCID) 40 MG tablet Take 1 tablet (40 mg) by mouth at bedtime., Disp-30 tablet, R-0, E-Prescribe      !! omeprazole (PRILOSEC) 40 MG DR capsule Take 1 capsule (40 mg) by mouth daily., Disp-30 capsule, R-0, E-Prescribe       !! - Potential duplicate medications found. Please discuss with provider.          Final diagnoses:    Acute alcoholic gastritis without hemorrhage   Thrombocytopenia (H24) - related to alcohol use   Elevated liver function tests - related to alcohol use.   Diplopia   Alcohol abuse       9/16/2024   [facility] EMERGENCY DEPT       Kayla Dawkins APRN CNP  09/16/24 0962

## 2024-09-16 NOTE — ED NOTES
Pt reports she hasn't taken her BP meds this morning. Provider notified and pt instructed to take her medications and F/U with her primary doctor for a recheck.

## 2024-09-17 ENCOUNTER — TELEPHONE (OUTPATIENT)
Dept: FAMILY MEDICINE | Facility: CLINIC | Age: 54
End: 2024-09-17
Payer: COMMERCIAL

## 2024-09-17 NOTE — TELEPHONE ENCOUNTER
S: Quit drinking assistance    B: Patient states she was in the ED the night of 09/16/2024 and is requesting help to quit drinking. She states she drinks a lot and was told last night in the ED that she has a inflamed liver r/t the alcohol. Patient states her daughter will being coming to stay with her and her  tonight to help watch so they don't drink.     A: Patient states she needs help to quit drinking. Please see ED note dated 09/16/2024.  Clinical Impressions  Acute alcoholic gastritis without hemorrhage  Thrombocytopenia (H24)   Elevated liver function tests   Diplopia  Alcohol abuse    R: Appointment made for patient with Dr. Blackwood who specializes in Suboxone on 09/18/2024 with arrival time of 1040, patient verbalized understanding, agreeable to plan and no further questions at this time.    Astrid Watkins RN on 9/17/2024 at 3:50 PM

## 2024-09-18 ENCOUNTER — TELEPHONE (OUTPATIENT)
Dept: FAMILY MEDICINE | Facility: CLINIC | Age: 54
End: 2024-09-18

## 2024-09-18 ENCOUNTER — OFFICE VISIT (OUTPATIENT)
Dept: FAMILY MEDICINE | Facility: CLINIC | Age: 54
End: 2024-09-18
Payer: COMMERCIAL

## 2024-09-18 VITALS
HEART RATE: 102 BPM | BODY MASS INDEX: 22.09 KG/M2 | RESPIRATION RATE: 15 BRPM | WEIGHT: 124.7 LBS | OXYGEN SATURATION: 100 % | HEIGHT: 63 IN | TEMPERATURE: 97.7 F | SYSTOLIC BLOOD PRESSURE: 166 MMHG | DIASTOLIC BLOOD PRESSURE: 106 MMHG

## 2024-09-18 DIAGNOSIS — I10 HTN, GOAL BELOW 140/90: ICD-10-CM

## 2024-09-18 DIAGNOSIS — F10.930 ALCOHOL WITHDRAWAL SYNDROME WITHOUT COMPLICATION (H): ICD-10-CM

## 2024-09-18 DIAGNOSIS — F10.20 UNCOMPLICATED ALCOHOL DEPENDENCE (H): Primary | ICD-10-CM

## 2024-09-18 DIAGNOSIS — R74.8 ELEVATED LIVER ENZYMES: ICD-10-CM

## 2024-09-18 DIAGNOSIS — K21.9 GASTROESOPHAGEAL REFLUX DISEASE WITHOUT ESOPHAGITIS: ICD-10-CM

## 2024-09-18 DIAGNOSIS — G47.00 INSOMNIA, UNSPECIFIED TYPE: ICD-10-CM

## 2024-09-18 DIAGNOSIS — F33.9 RECURRENT MAJOR DEPRESSIVE DISORDER, REMISSION STATUS UNSPECIFIED (H): ICD-10-CM

## 2024-09-18 DIAGNOSIS — F41.1 ANXIETY STATE: ICD-10-CM

## 2024-09-18 DIAGNOSIS — D69.6 THROMBOCYTOPENIA (H): ICD-10-CM

## 2024-09-18 PROCEDURE — 99214 OFFICE O/P EST MOD 30 MIN: CPT | Performed by: STUDENT IN AN ORGANIZED HEALTH CARE EDUCATION/TRAINING PROGRAM

## 2024-09-18 PROCEDURE — G2211 COMPLEX E/M VISIT ADD ON: HCPCS | Performed by: STUDENT IN AN ORGANIZED HEALTH CARE EDUCATION/TRAINING PROGRAM

## 2024-09-18 RX ORDER — LANOLIN ALCOHOL/MO/W.PET/CERES
400 CREAM (GRAM) TOPICAL DAILY
Qty: 90 TABLET | Refills: 0 | Status: SHIPPED | OUTPATIENT
Start: 2024-09-18

## 2024-09-18 RX ORDER — GABAPENTIN 300 MG/1
CAPSULE ORAL
Qty: 14 CAPSULE | Refills: 0 | Status: SHIPPED | OUTPATIENT
Start: 2024-09-18 | End: 2024-09-25

## 2024-09-18 RX ORDER — CLONIDINE HYDROCHLORIDE 0.1 MG/1
0.1 TABLET ORAL 2 TIMES DAILY
Qty: 30 TABLET | Refills: 0 | Status: SHIPPED | OUTPATIENT
Start: 2024-09-18 | End: 2024-09-25

## 2024-09-18 RX ORDER — LANOLIN ALCOHOL/MO/W.PET/CERES
100 CREAM (GRAM) TOPICAL DAILY
Qty: 90 TABLET | Refills: 0 | Status: SHIPPED | OUTPATIENT
Start: 2024-09-18

## 2024-09-18 ASSESSMENT — PAIN SCALES - GENERAL: PAINLEVEL: MILD PAIN (3)

## 2024-09-18 ASSESSMENT — PATIENT HEALTH QUESTIONNAIRE - PHQ9
SUM OF ALL RESPONSES TO PHQ QUESTIONS 1-9: 15
10. IF YOU CHECKED OFF ANY PROBLEMS, HOW DIFFICULT HAVE THESE PROBLEMS MADE IT FOR YOU TO DO YOUR WORK, TAKE CARE OF THINGS AT HOME, OR GET ALONG WITH OTHER PEOPLE: SOMEWHAT DIFFICULT
SUM OF ALL RESPONSES TO PHQ QUESTIONS 1-9: 15

## 2024-09-18 NOTE — TELEPHONE ENCOUNTER
Patient called in stating she seen Dr. Blackwood today and she got some new medications but doesn't remember what the instructions were exactly. She is asking for clarification on the plan. Writer read her the plan from today's visit notes. She stated she was going to write it down. She did repeat back the instructions and verbalized understanding.    Follow up appointment made for 1 week from now, per patient request. She had no further questions or concerns.     Dr. Blackwood- In office notes you said patient should have labs in 1 week. Should patient go to lab first before visit, or do you plan to discuss lab orders in visit? I see previous labs ordered by Caity but no new labs ordered.     Lilia Guo, MEHRDADN, RN

## 2024-09-18 NOTE — PROGRESS NOTES
Assessment & Plan   Problem List Items Addressed This Visit          Digestive    Gastroesophageal reflux disease without esophagitis       Circulatory    HTN, goal below 140/90    Relevant Medications    cloNIDine (CATAPRES) 0.1 MG tablet       Behavioral    Major depression, recurrent (H)       Other    Anxiety state    Relevant Medications    gabapentin (NEURONTIN) 300 MG capsule    Insomnia     Other Visit Diagnoses       Uncomplicated alcohol dependence (H)    -  Primary    Relevant Medications    thiamine (B-1) 100 MG tablet    folic acid (FOLVITE) 400 MCG tablet    Alcohol withdrawal syndrome without complication (H)        Relevant Medications    cloNIDine (CATAPRES) 0.1 MG tablet    gabapentin (NEURONTIN) 300 MG capsule    Thrombocytopenia (H24)        Elevated liver enzymes               Congratulated patient on deciding to quit drinking and risk of ongoing use as well as continued liver damage and consequences thereof discussed.  Continue with PPI and recommend she start folic acid and thiamine supplementation.  She should have labs repeated next week.  Blood pressure elevated in clinic today and slightly tachycardic.  Given her significant alcohol discussed the risk of alcohol withdrawal and potential symptoms going forward.  She is now 2 days sober with some tachycardia and hypertension but otherwise feeling well.  Risk of deadly withdrawal symptoms discussed but I think reasonable to do trial of outpatient treatment for potential withdrawal to help with starting her sobriety.  She understands to be seen in ER if worsening symptoms.  Did discuss options of diazepam versus gabapentin taper and will do a gabapentin taper now.  Risk of sedation and consequences of controlled substance discussed..  Along with potential side effects.  Monitor home blood pressure and if greater than 160/100 plan to start clonidine twice daily.  Follow-up in clinic in 1 week.  She does not want counseling resources at this  "time but will consider AA.  We also discussed ongoing medications to help with cravings such as naltrexone or medications like disulfiram.  She would like to hold off on this now but we will discuss next week.           Tonay Low is a 54 year old, presenting for the following health issues:  Alcohol Problem and Liver (High enzymes )        9/18/2024    10:57 AM   Additional Questions   Roomed by Valentino     History of Present Illness       Reason for visit:  Alcohol withdrawl and low blood platlets    She eats 4 or more servings of fruits and vegetables daily.She consumes 0 sweetened beverage(s) daily.She exercises with enough effort to increase her heart rate 60 or more minutes per day.  She exercises with enough effort to increase her heart rate 5 days per week. She is missing 1 dose(s) of medications per week.     Patient seen in ER for GI issues and found to have liver enzyme elevation as well as low platelets.  Ongoing alcohol dependence with on and off use over the years with severe over this last year.  Notes 6-10 mixed drinks per day.  Rare marijuana use in the past and no other drug use.  She has not been treatment previously.  Has not done AAA.  Blood pressure has been stable prior on losartan and hydrochlorothiazide.  No history of alcohol withdrawal or seizures.  Drinking almost daily for the last year.  Notes stress to be a contributing factor as well as ongoing anxiety but she does not have significant depression or anxiety concerns at this time.  Notes Wellbutrin to be helpful with this.      Review of Systems  Constitutional, HEENT, cardiovascular, pulmonary, GI, , musculoskeletal, neuro, skin, endocrine and psych systems are negative, except as otherwise noted.      Objective    BP (!) 166/106 (BP Location: Right arm, Patient Position: Sitting, Cuff Size: Adult Regular)   Pulse 102   Temp 97.7  F (36.5  C) (Temporal)   Resp 15   Ht 1.595 m (5' 2.8\")   Wt 56.6 kg (124 lb 11.2 oz)   " LMP  (LMP Unknown)   SpO2 100%   BMI 22.23 kg/m    Body mass index is 22.23 kg/m .  Physical Exam   GENERAL: alert and no distress  EYES: Eyes grossly normal to inspection, PERRL and conjunctivae and sclerae normal  HENT: nose and mouth without ulcers or lesions  NECK: no adenopathy, no asymmetry, masses, or scars  RESP: lungs clear to auscultation - no rales, rhonchi or wheezes  CV: regular rate and rhythm, normal S1 S2, no S3 or S4, no murmur, click or rub, no peripheral edema  ABDOMEN: soft, nontender, no hepatosplenomegaly, no masses and bowel sounds normal  MS: no gross musculoskeletal defects noted, no edema  SKIN: no suspicious lesions or rashes  NEURO: mentation intact and speech normal  PSYCH: mentation appears normal, affect normal/bright          Signed Electronically by: Sai Blackwood MD

## 2024-09-18 NOTE — TELEPHONE ENCOUNTER
Reason for Call:  Same Day Appointment, Requested Provider:  [place]    PCP: Caity Villanueva    Reason for visit:   F/U from 9/18 visit    Duration of symptoms: was told to come back in one week    Have you been treated for this in the past? Yes    Additional comments: Please leave a detailed message if she does not answer.     Can we leave a detailed message on this number? YES    Phone number patient can be reached at: Cell number on file:    Telephone Information:   Mobile [phone]     Best Time: any    Call taken on 9/18/2024 at 11:49 AM by Kim Burroughs CNA

## 2024-09-25 ENCOUNTER — OFFICE VISIT (OUTPATIENT)
Dept: FAMILY MEDICINE | Facility: CLINIC | Age: 54
End: 2024-09-25
Payer: COMMERCIAL

## 2024-09-25 VITALS
DIASTOLIC BLOOD PRESSURE: 76 MMHG | OXYGEN SATURATION: 99 % | WEIGHT: 122.9 LBS | TEMPERATURE: 97.4 F | RESPIRATION RATE: 15 BRPM | HEART RATE: 75 BPM | HEIGHT: 62 IN | SYSTOLIC BLOOD PRESSURE: 110 MMHG | BODY MASS INDEX: 22.62 KG/M2

## 2024-09-25 DIAGNOSIS — R74.8 ELEVATED LIVER ENZYMES: ICD-10-CM

## 2024-09-25 DIAGNOSIS — F10.930 ALCOHOL WITHDRAWAL SYNDROME WITHOUT COMPLICATION (H): ICD-10-CM

## 2024-09-25 DIAGNOSIS — F10.20 UNCOMPLICATED ALCOHOL DEPENDENCE (H): Primary | ICD-10-CM

## 2024-09-25 DIAGNOSIS — D69.6 THROMBOCYTOPENIA (H): ICD-10-CM

## 2024-09-25 LAB
ALBUMIN SERPL BCG-MCNC: 4.6 G/DL (ref 3.5–5.2)
ALP SERPL-CCNC: 91 U/L (ref 40–150)
ALT SERPL W P-5'-P-CCNC: 39 U/L (ref 0–50)
ANION GAP SERPL CALCULATED.3IONS-SCNC: 13 MMOL/L (ref 7–15)
AST SERPL W P-5'-P-CCNC: 24 U/L (ref 0–45)
BILIRUB SERPL-MCNC: 0.6 MG/DL
BUN SERPL-MCNC: 14.9 MG/DL (ref 6–20)
CALCIUM SERPL-MCNC: 10.5 MG/DL (ref 8.8–10.4)
CHLORIDE SERPL-SCNC: 98 MMOL/L (ref 98–107)
CREAT SERPL-MCNC: 0.88 MG/DL (ref 0.51–0.95)
EGFRCR SERPLBLD CKD-EPI 2021: 79 ML/MIN/1.73M2
ERYTHROCYTE [DISTWIDTH] IN BLOOD BY AUTOMATED COUNT: 12.1 % (ref 10–15)
GLUCOSE SERPL-MCNC: 108 MG/DL (ref 70–99)
HCO3 SERPL-SCNC: 26 MMOL/L (ref 22–29)
HCT VFR BLD AUTO: 37.9 % (ref 35–47)
HGB BLD-MCNC: 13 G/DL (ref 11.7–15.7)
MCH RBC QN AUTO: 34.7 PG (ref 26.5–33)
MCHC RBC AUTO-ENTMCNC: 34.3 G/DL (ref 31.5–36.5)
MCV RBC AUTO: 101 FL (ref 78–100)
PLATELET # BLD AUTO: 252 10E3/UL (ref 150–450)
POTASSIUM SERPL-SCNC: 3.9 MMOL/L (ref 3.4–5.3)
PROT SERPL-MCNC: 7.6 G/DL (ref 6.4–8.3)
RBC # BLD AUTO: 3.75 10E6/UL (ref 3.8–5.2)
SODIUM SERPL-SCNC: 137 MMOL/L (ref 135–145)
WBC # BLD AUTO: 6.6 10E3/UL (ref 4–11)

## 2024-09-25 PROCEDURE — 85027 COMPLETE CBC AUTOMATED: CPT | Performed by: STUDENT IN AN ORGANIZED HEALTH CARE EDUCATION/TRAINING PROGRAM

## 2024-09-25 PROCEDURE — G2211 COMPLEX E/M VISIT ADD ON: HCPCS | Performed by: STUDENT IN AN ORGANIZED HEALTH CARE EDUCATION/TRAINING PROGRAM

## 2024-09-25 PROCEDURE — 99213 OFFICE O/P EST LOW 20 MIN: CPT | Performed by: STUDENT IN AN ORGANIZED HEALTH CARE EDUCATION/TRAINING PROGRAM

## 2024-09-25 PROCEDURE — 36415 COLL VENOUS BLD VENIPUNCTURE: CPT | Performed by: STUDENT IN AN ORGANIZED HEALTH CARE EDUCATION/TRAINING PROGRAM

## 2024-09-25 PROCEDURE — 80053 COMPREHEN METABOLIC PANEL: CPT | Performed by: STUDENT IN AN ORGANIZED HEALTH CARE EDUCATION/TRAINING PROGRAM

## 2024-09-25 ASSESSMENT — PAIN SCALES - GENERAL: PAINLEVEL: NO PAIN (0)

## 2024-09-25 NOTE — PROGRESS NOTES
Assessment & Plan   Problem List Items Addressed This Visit    None  Visit Diagnoses       Uncomplicated alcohol dependence (H)    -  Primary    Alcohol withdrawal syndrome without complication (H)        Thrombocytopenia (H24)        Relevant Orders    CBC with platelets (Completed)    Elevated liver enzymes        Relevant Orders    Comprehensive metabolic panel (BMP + Alb, Alk Phos, ALT, AST, Total. Bili, TP)            Patient with no further signs of withdrawal and congratulated her on current sobriety.  Importance of long-term plan to maintain her sobriety discussed.  Recommend AA if she is not wanting further treatment options.  Recommend supportive network around her in cessation and  as well.  Recommend completely removing alcohol from the house.  Risk of liver disease and cirrhosis discussed.  Will repeat liver enzymes and platelets today.  Continue with multivitamin and folic acid and thiamine.  Blood pressure stable on losartan hydrochlorothiazide.  Follow-up with me in 6 months but sooner if new or worsening issues arise.  Can hold famotidine when completed.           Tonya Low is a 54 year old, presenting for the following health issues:  RECHECK and Recheck Medication        9/25/2024    10:34 AM   Additional Questions   Roomed by Keena BENOIT     HPI     Patient reports feeling well since her previous visit and has not had further alcohol.  Very mild withdrawal symptoms early on and completed gabapentin.  Used 1 dose of clonidine but otherwise blood pressure looks wonderful.  Notes her stomach and skin feeling better.  Has more energy.  He does not plan to return to drinking.  Currently not on any treatment programs.   is also working on sobriety.  They do of alcohol in the house.       Review of Systems  Constitutional, HEENT, cardiovascular, pulmonary, GI, , musculoskeletal, neuro, skin, endocrine and psych systems are negative, except as otherwise noted.      Objective    BP  "110/76   Pulse 75   Temp 97.4  F (36.3  C) (Temporal)   Resp 15   Ht 1.585 m (5' 2.4\")   Wt 55.7 kg (122 lb 14.4 oz)   LMP  (LMP Unknown)   SpO2 99%   BMI 22.19 kg/m    Body mass index is 22.19 kg/m .  Physical Exam   GENERAL: alert and no distress  EYES: Eyes grossly normal to inspection, PERRL and conjunctivae and sclerae normal  HENT:  nose and mouth without ulcers or lesions  RESP: lungs clear to auscultation - no rales, rhonchi or wheezes  CV: regular rate and rhythm, normal S1 S2, no S3 or S4, no murmur, click or rub, no peripheral edema  ABDOMEN: soft, nontender, no hepatosplenomegaly, no masses and bowel sounds normal  MS: no gross musculoskeletal defects noted, no edema  SKIN: no suspicious lesions or rashes  NEURO: mentation intact and speech normal  PSYCH: mentation appears normal, affect normal/bright          Signed Electronically by: Sai Blackwood MD    "

## 2024-10-21 ENCOUNTER — MYC REFILL (OUTPATIENT)
Dept: FAMILY MEDICINE | Facility: CLINIC | Age: 54
End: 2024-10-21
Payer: COMMERCIAL

## 2024-10-21 DIAGNOSIS — K21.9 GASTROESOPHAGEAL REFLUX DISEASE WITHOUT ESOPHAGITIS: ICD-10-CM

## 2024-10-22 ENCOUNTER — TELEPHONE (OUTPATIENT)
Dept: FAMILY MEDICINE | Facility: CLINIC | Age: 54
End: 2024-10-22

## 2024-10-22 RX ORDER — OMEPRAZOLE 40 MG/1
40 CAPSULE, DELAYED RELEASE ORAL DAILY
Qty: 30 CAPSULE | Refills: 0 | OUTPATIENT
Start: 2024-10-22

## 2024-10-22 NOTE — TELEPHONE ENCOUNTER
Patient Quality Outreach    Patient is due for the following:   Colon Cancer Screening  Breast Cancer Screening - Mammogram  Physical Preventive Adult Physical      Topic Date Due    Pneumococcal Vaccine (1 of 2 - PCV) Never done    Hepatitis B Vaccine (1 of 3 - 19+ 3-dose series) Never done    Zoster (Shingles) Vaccine (1 of 2) Never done    Diptheria Tetanus Pertussis (DTAP/TDAP/TD) Vaccine (2 - Td or Tdap) 11/19/2023    Flu Vaccine (1) Never done    COVID-19 Vaccine (1 - 2024-25 season) Never done       Next Steps:   Schedule a nurse only visit for Immunizations     Type of outreach:    Sent [employer] message.      Questions for provider review:    None           Sarah Dillon CNA

## 2024-11-10 ENCOUNTER — HEALTH MAINTENANCE LETTER (OUTPATIENT)
Age: 54
End: 2024-11-10

## 2024-12-11 NOTE — TELEPHONE ENCOUNTER
Patient Quality Outreach    Patient is due for the following:   Colon Cancer Screening  Breast Cancer Screening - Mammogram  Depression  -  PHQ-9 needed  Physical Preventive Adult Physical      Topic Date Due    Pneumococcal Vaccine (1 of 2 - PCV) Never done    Hepatitis B Vaccine (1 of 3 - 19+ 3-dose series) Never done    Zoster (Shingles) Vaccine (1 of 2) Never done    Diptheria Tetanus Pertussis (DTAP/TDAP/TD) Vaccine (2 - Td or Tdap) 11/19/2023    Flu Vaccine (1) Never done    COVID-19 Vaccine (1 - 2024-25 season) Never done       Action(s) Taken:   Schedule a Adult Preventative    Type of outreach:    Phone, left message for patient/parent to call back.    Questions for provider review:               Sarah Dillon CNA

## 2025-03-19 DIAGNOSIS — K21.9 GASTROESOPHAGEAL REFLUX DISEASE WITHOUT ESOPHAGITIS: ICD-10-CM

## 2025-03-19 RX ORDER — OMEPRAZOLE 20 MG/1
CAPSULE, DELAYED RELEASE ORAL
Qty: 90 CAPSULE | Refills: 1 | Status: SHIPPED | OUTPATIENT
Start: 2025-03-19

## 2025-04-02 ENCOUNTER — ANCILLARY ORDERS (OUTPATIENT)
Dept: MAMMOGRAPHY | Facility: CLINIC | Age: 55
End: 2025-04-02
Payer: COMMERCIAL

## 2025-04-02 DIAGNOSIS — L03.90 CELLULITIS, UNSPECIFIED: Primary | ICD-10-CM

## 2025-05-07 ENCOUNTER — HOSPITAL ENCOUNTER (OUTPATIENT)
Dept: MAMMOGRAPHY | Facility: CLINIC | Age: 55
Discharge: HOME | End: 2025-05-07
Attending: PHYSICIAN ASSISTANT
Payer: COMMERCIAL

## 2025-05-07 DIAGNOSIS — L03.90 CELLULITIS, UNSPECIFIED: ICD-10-CM

## 2025-05-07 PROCEDURE — 77062 BREAST TOMOSYNTHESIS BI: CPT

## 2025-05-15 ENCOUNTER — APPOINTMENT (OUTPATIENT)
Dept: GENERAL RADIOLOGY | Facility: CLINIC | Age: 55
End: 2025-05-15
Attending: FAMILY MEDICINE
Payer: COMMERCIAL

## 2025-05-15 ENCOUNTER — HOSPITAL ENCOUNTER (EMERGENCY)
Facility: CLINIC | Age: 55
Discharge: HOME | End: 2025-05-15
Attending: FAMILY MEDICINE
Payer: COMMERCIAL

## 2025-05-15 VITALS
BODY MASS INDEX: 23 KG/M2 | WEIGHT: 125 LBS | DIASTOLIC BLOOD PRESSURE: 87 MMHG | SYSTOLIC BLOOD PRESSURE: 144 MMHG | RESPIRATION RATE: 18 BRPM | HEART RATE: 87 BPM | HEIGHT: 62 IN | TEMPERATURE: 98.2 F | OXYGEN SATURATION: 97 %

## 2025-05-15 DIAGNOSIS — R07.9 CHEST PAIN, UNSPECIFIED TYPE: ICD-10-CM

## 2025-05-15 DIAGNOSIS — K70.10 ALCOHOLIC HEPATITIS, UNSPECIFIED WHETHER ASCITES PRESENT (H): ICD-10-CM

## 2025-05-15 DIAGNOSIS — N39.0 URINARY TRACT INFECTION WITHOUT HEMATURIA, SITE UNSPECIFIED: ICD-10-CM

## 2025-05-15 LAB
ALBUMIN SERPL BCG-MCNC: 5 G/DL (ref 3.5–5.2)
ALBUMIN UR-MCNC: NEGATIVE MG/DL
ALP SERPL-CCNC: 131 U/L (ref 40–150)
ALT SERPL W P-5'-P-CCNC: 85 U/L (ref 0–50)
ANION GAP SERPL CALCULATED.3IONS-SCNC: 18 MMOL/L (ref 7–15)
APPEARANCE UR: CLEAR
AST SERPL W P-5'-P-CCNC: 112 U/L (ref 0–45)
ATRIAL RATE - MUSE: 99 BPM
BACTERIA #/AREA URNS HPF: ABNORMAL /HPF
BASOPHILS # BLD AUTO: 0 10E3/UL (ref 0–0.2)
BASOPHILS NFR BLD AUTO: 1 %
BILIRUB SERPL-MCNC: 0.8 MG/DL
BILIRUB UR QL STRIP: NEGATIVE
BUN SERPL-MCNC: 30 MG/DL (ref 6–20)
CALCIUM SERPL-MCNC: 10.1 MG/DL (ref 8.8–10.4)
CHLORIDE SERPL-SCNC: 95 MMOL/L (ref 98–107)
COLOR UR AUTO: ABNORMAL
CREAT SERPL-MCNC: 1.05 MG/DL (ref 0.51–0.95)
DIASTOLIC BLOOD PRESSURE - MUSE: NORMAL MMHG
EGFRCR SERPLBLD CKD-EPI 2021: 63 ML/MIN/1.73M2
EOSINOPHIL # BLD AUTO: 0 10E3/UL (ref 0–0.7)
EOSINOPHIL NFR BLD AUTO: 1 %
ERYTHROCYTE [DISTWIDTH] IN BLOOD BY AUTOMATED COUNT: 11.7 % (ref 10–15)
GLUCOSE SERPL-MCNC: 102 MG/DL (ref 70–99)
GLUCOSE UR STRIP-MCNC: NEGATIVE MG/DL
HCO3 SERPL-SCNC: 22 MMOL/L (ref 22–29)
HCT VFR BLD AUTO: 32.6 % (ref 35–47)
HGB BLD-MCNC: 11.4 G/DL (ref 11.7–15.7)
HGB UR QL STRIP: NEGATIVE
HOLD SPECIMEN: NORMAL
IMM GRANULOCYTES # BLD: 0 10E3/UL
IMM GRANULOCYTES NFR BLD: 0 %
INTERPRETATION ECG - MUSE: NORMAL
KETONES UR STRIP-MCNC: NEGATIVE MG/DL
LEUKOCYTE ESTERASE UR QL STRIP: ABNORMAL
LYMPHOCYTES # BLD AUTO: 0.7 10E3/UL (ref 0.8–5.3)
LYMPHOCYTES NFR BLD AUTO: 13 %
MCH RBC QN AUTO: 34.7 PG (ref 26.5–33)
MCHC RBC AUTO-ENTMCNC: 35 G/DL (ref 31.5–36.5)
MCV RBC AUTO: 99 FL (ref 78–100)
MONOCYTES # BLD AUTO: 0.7 10E3/UL (ref 0–1.3)
MONOCYTES NFR BLD AUTO: 14 %
NEUTROPHILS # BLD AUTO: 3.6 10E3/UL (ref 1.6–8.3)
NEUTROPHILS NFR BLD AUTO: 71 %
NITRATE UR QL: NEGATIVE
NRBC # BLD AUTO: 0 10E3/UL
NRBC BLD AUTO-RTO: 0 /100
NT-PROBNP SERPL-MCNC: 51 PG/ML (ref 0–192)
P AXIS - MUSE: 71 DEGREES
PH UR STRIP: 5.5 [PH] (ref 5–7)
PLATELET # BLD AUTO: 149 10E3/UL (ref 150–450)
POTASSIUM SERPL-SCNC: 4 MMOL/L (ref 3.4–5.3)
PR INTERVAL - MUSE: 138 MS
PROT SERPL-MCNC: 7.7 G/DL (ref 6.4–8.3)
QRS DURATION - MUSE: 82 MS
QT - MUSE: 342 MS
QTC - MUSE: 438 MS
R AXIS - MUSE: 40 DEGREES
RBC # BLD AUTO: 3.29 10E6/UL (ref 3.8–5.2)
RBC URINE: 0 /HPF
SODIUM SERPL-SCNC: 135 MMOL/L (ref 135–145)
SP GR UR STRIP: 1.01 (ref 1–1.03)
SQUAMOUS EPITHELIAL: 2 /HPF
SYSTOLIC BLOOD PRESSURE - MUSE: NORMAL MMHG
T AXIS - MUSE: 42 DEGREES
TROPONIN T SERPL HS-MCNC: 8 NG/L
TROPONIN T SERPL HS-MCNC: 8 NG/L
UROBILINOGEN UR STRIP-MCNC: NORMAL MG/DL
VENTRICULAR RATE- MUSE: 99 BPM
WBC # BLD AUTO: 5 10E3/UL (ref 4–11)
WBC URINE: 39 /HPF

## 2025-05-15 PROCEDURE — 83880 ASSAY OF NATRIURETIC PEPTIDE: CPT | Performed by: FAMILY MEDICINE

## 2025-05-15 PROCEDURE — 84484 ASSAY OF TROPONIN QUANT: CPT | Performed by: FAMILY MEDICINE

## 2025-05-15 PROCEDURE — 36415 COLL VENOUS BLD VENIPUNCTURE: CPT | Performed by: FAMILY MEDICINE

## 2025-05-15 PROCEDURE — 99285 EMERGENCY DEPT VISIT HI MDM: CPT | Mod: 25 | Performed by: FAMILY MEDICINE

## 2025-05-15 PROCEDURE — 99285 EMERGENCY DEPT VISIT HI MDM: CPT | Performed by: FAMILY MEDICINE

## 2025-05-15 PROCEDURE — 81003 URINALYSIS AUTO W/O SCOPE: CPT | Performed by: FAMILY MEDICINE

## 2025-05-15 PROCEDURE — 71045 X-RAY EXAM CHEST 1 VIEW: CPT

## 2025-05-15 PROCEDURE — 84155 ASSAY OF PROTEIN SERUM: CPT | Performed by: FAMILY MEDICINE

## 2025-05-15 PROCEDURE — 87186 SC STD MICRODIL/AGAR DIL: CPT | Performed by: FAMILY MEDICINE

## 2025-05-15 PROCEDURE — 93010 ELECTROCARDIOGRAM REPORT: CPT | Performed by: FAMILY MEDICINE

## 2025-05-15 PROCEDURE — 85004 AUTOMATED DIFF WBC COUNT: CPT | Performed by: FAMILY MEDICINE

## 2025-05-15 PROCEDURE — 93005 ELECTROCARDIOGRAM TRACING: CPT | Performed by: FAMILY MEDICINE

## 2025-05-15 RX ORDER — NITROFURANTOIN 25; 75 MG/1; MG/1
100 CAPSULE ORAL 2 TIMES DAILY
Qty: 14 CAPSULE | Refills: 0 | Status: SHIPPED | OUTPATIENT
Start: 2025-05-15

## 2025-05-15 ASSESSMENT — ACTIVITIES OF DAILY LIVING (ADL)
ADLS_ACUITY_SCORE: 41

## 2025-05-16 NOTE — ED TRIAGE NOTES
Started having chest pain around 1700 today, states also having pain between her shoulder blades.  has felt off all day today and at one point lost vision in her left eye and that has happened before with her migraines.  Pt, states she currently does have a slight headache     Triage Assessment (Adult)       Row Name 05/15/25 1921          Triage Assessment    Airway WDL WDL        Respiratory WDL    Respiratory WDL WDL        Cognitive/Neuro/Behavioral WDL    Cognitive/Neuro/Behavioral WDL WDL

## 2025-05-16 NOTE — ED PROVIDER NOTES
[facility] ED Provider Note   Patient: Maranda Figueroa  MRN #:  [id number]  Date of Visit: May 15, 2025    CC:     Chief Complaint   Patient presents with    Chest Pain     HPI:  Maranda Figueroa is a 55 year old female who presented to the emergency department with acute onset of fatigue for the past several days, associated with chest discomfort rated 3 out of 10 with some radiation into the left armpit.  She has also had a slight pain in the left side of the neck for the last few days.  Around noon today, she started to feel some tingling throughout her body, took her blood pressure and noticed that it was high.  She started develop the chest discomfort described as tightness in the central portion of her chest.  She did not have any shortness of breath, diaphoresis, nausea.  She also reports that she had a very brief moment of left eye visual disturbance, similar to what she has had before at the onset of her migraine.  She checked her blood pressure and it was high.  She took her significant others blood pressure medication in addition to having taken her regular blood pressure medication this morning at around 5 PM.  Her blood pressure went down slightly but then started to come back up again.  Her blood pressure was in the range of 160/90 at home.  Patient was concerned that she could be developing a heart attack since her significant other had 1 with similar symptoms several years ago.  Patient denies any tobacco use but does smoke marijuana several days a week.  She continues to drink alcohol, and today had about 6 ounces around 5 PM.  She has had peptic ulcer disease and is taking omeprazole which she took at noon.  She has not had any unilateral calf pain or ankle swelling, although she states she has occasional cramps.  Patient does not have any exertional symptoms and no pleuritic symptoms.  Additional risk factors for possible  prediabetes, hypertension.  History of anxiety and panic disorder.    Problem List:  Patient Active Problem List    Diagnosis Date Noted    Major depression, recurrent (H) 09/18/2024     Priority: Medium    Intractable chronic migraine without aura and with status migrainosus 03/01/2018     Priority: Medium    Gastroesophageal reflux disease without esophagitis 04/20/2016     Priority: Medium    Panic attack 04/20/2016     Priority: Medium    Vitamin D deficiency 02/10/2016     Priority: Medium    HTN, goal below 140/90 10/07/2015     Priority: Medium    Major depression in complete remission (H) 11/19/2013     Priority: Medium    Unspecified hearing loss, unspecified ear 11/19/2013     Priority: Medium     Left ear      Insomnia 11/19/2013     Priority: Medium    Anxiety state 05/24/2006     Priority: Medium     Problem list name updated by automated process. Provider to review         Past Medical History:   Diagnosis Date    Anxiety state, unspecified     Depressive disorder, not elsewhere classified     Unspecified conductive hearing loss     Vitamin D deficiency 2/10/2016       MEDS: buPROPion (WELLBUTRIN SR) 150 MG 12 hr tablet  famotidine (PEPCID) 40 MG tablet  folic acid (FOLVITE) 400 MCG tablet  hydrochlorothiazide (HYDRODIURIL) 25 MG tablet  losartan (COZAAR) 100 MG tablet  omeprazole (PRILOSEC) 20 MG DR capsule  omeprazole (PRILOSEC) 40 MG DR capsule  rizatriptan (MAXALT) 5 MG tablet  thiamine (B-1) 100 MG tablet        ALLERGIES:    Allergies   Allergen Reactions    Penicillins      Causes nausea with emesis       Past Surgical History:   Procedure Laterality Date    ESOPHAGOSCOPY, GASTROSCOPY, DUODENOSCOPY (EGD), COMBINED N/A 10/5/2015    Procedure: COMBINED ESOPHAGOSCOPY, GASTROSCOPY, DUODENOSCOPY (EGD), BIOPSY SINGLE OR MULTIPLE;  Surgeon: Calin Kenyon MD;  Location:  GI    OPEN REDUCTION INTERNAL FIXATION RODDING INTRAMEDULLARY TIBIA  11/6/2011    Procedure:OPEN REDUCTION INTERNAL FIXATION  "RODDING INTRAMEDULLARY TIBIA; Open Reduction internal fixation rodding right tibia; Surgeon:JUNO TUTTLE; Location:[facility] OR    TYMPANOPLASTY, RT/LT      Tympanoplasty/LT       Social History     Tobacco Use    Smoking status: Former     Current packs/day: 0.00     Types: Cigarettes     Quit date: 1994     Years since quittin.9    Smokeless tobacco: Never   Vaping Use    Vaping status: Never Used   Substance Use Topics    Alcohol use: Yes     Alcohol/week: 1.7 standard drinks of alcohol     Comment: Occ.    Drug use: No         Review of Systems   Except as noted in HPI, all other systems were reviewed and are negative    Physical Exam   Vitals were reviewed  Patient Vitals for the past 12 hrs:   BP Temp Pulse Resp SpO2 Height Weight   05/15/25 2141 -- -- 89 14 97 % -- --   05/15/25 2130 (!) 141/82 -- 93 13 97 % -- --   05/15/25 2045 (!) 142/75 -- 103 17 99 % -- --   05/15/25 2030 (!) 153/63 -- 90 14 97 % -- --   05/15/25 2015 (!) 156/94 -- 94 16 98 % -- --   05/15/25 2009 (!) 160/90 -- 98 16 99 % -- --   05/15/25 2000 (!) 152/95 -- 100 18 99 % -- --   05/15/25 1945 (!) 153/90 -- 93 21 98 % -- --   05/15/25 1930 (!) 161/110 -- 95 -- 100 % -- --   05/15/25 1919 (!) 161/97 98.2  F (36.8  C) 98 20 100 % 1.575 m (5' 2\") 56.7 kg (125 lb)     GENERAL APPEARANCE: Alert and oriented x 3, no acute distress  FACE: normal facies  EYES: Pupils are equal; sclera is nonicteric  HENT: normal external exam  NECK: no adenopathy or asymmetry  RESP: normal respiratory effort; clear breath sounds bilaterally  CV: regular rate and rhythm; no significant murmurs, gallops or rubs  ABD: soft, mild epigastric tenderness with some pressure into the chest;; no rebound or guarding; bowel sounds are normal  MS: no gross deformities noted; normal muscle tone.  EXT: No calf tenderness or pitting edema  SKIN: no worrisome rash  NEURO: no facial droop; no focal deficits, speech is normal        Available Lab/Imaging Results "     Results for orders placed or performed during the hospital encounter of 05/15/25 (from the past 24 hours)   EKG 12 lead   Result Value Ref Range    Systolic Blood Pressure  mmHg    Diastolic Blood Pressure  mmHg    Ventricular Rate 99 BPM    Atrial Rate 99 BPM    PA Interval 138 ms    QRS Duration 82 ms     ms    QTc 438 ms    P Axis 71 degrees    R AXIS 40 degrees    T Axis 42 degrees    Interpretation ECG       Sinus rhythm  Normal ECG  No previous ECGs available  Confirmed by SEE ED PROVIDER NOTE FOR, ECG INTERPRETATION ([id number]),  Tracy Hickey ([id number]) on 5/15/2025 9:48:08 PM     [employer] Draw *Canceled*    Narrative    The following orders were created for panel order [employer] Draw.  Procedure                               Abnormality         Status                     ---------                               -----------         ------                       Please view results for these tests on the individual orders.   [employer] Draw    Narrative    The following orders were created for panel order [employer] Draw.  Procedure                               Abnormality         Status                     ---------                               -----------         ------                     Extra Blue Top Tube[[id number]]                             Final result               Extra Green Top (Lithiu...[[id number]]                      Final result               Extra Purple Top Tube[[id number]]                           Final result               Extra Heparinized Syringe[[id number]]                       Final result                 Please view results for these tests on the individual orders.   Extra Blue Top Tube   Result Value Ref Range    Hold Specimen JIC    Extra Green Top (Lithium Heparin) Tube   Result Value Ref Range    Hold Specimen JIC    Extra Purple Top Tube   Result Value Ref Range    Hold Specimen JIC    Extra Heparinized Syringe   Result Value Ref Range    Hold Specimen in lab    CBC with  platelets differential    Narrative    The following orders were created for panel order CBC with platelets differential.  Procedure                               Abnormality         Status                     ---------                               -----------         ------                     CBC with platelets and ...[[id number]]  Abnormal            Final result                 Please view results for these tests on the individual orders.   Comprehensive metabolic panel   Result Value Ref Range    Sodium 135 135 - 145 mmol/L    Potassium 4.0 3.4 - 5.3 mmol/L    Carbon Dioxide (CO2) 22 22 - 29 mmol/L    Anion Gap 18 (H) 7 - 15 mmol/L    Urea Nitrogen 30.0 (H) 6.0 - 20.0 mg/dL    Creatinine 1.05 (H) 0.51 - 0.95 mg/dL    GFR Estimate 63 >60 mL/min/1.73m2    Calcium 10.1 8.8 - 10.4 mg/dL    Chloride 95 (L) 98 - 107 mmol/L    Glucose 102 (H) 70 - 99 mg/dL    Alkaline Phosphatase 131 40 - 150 U/L     (H) 0 - 45 U/L    ALT 85 (H) 0 - 50 U/L    Protein Total 7.7 6.4 - 8.3 g/dL    Albumin 5.0 3.5 - 5.2 g/dL    Bilirubin Total 0.8 <=1.2 mg/dL   Troponin T, High Sensitivity   Result Value Ref Range    Troponin T, High Sensitivity 8 <=14 ng/L   NT-proBNP   Result Value Ref Range    NT-proBNP 51 0 - 192 pg/mL   CBC with platelets and differential   Result Value Ref Range    WBC Count 5.0 4.0 - 11.0 10e3/uL    RBC Count 3.29 (L) 3.80 - 5.20 10e6/uL    Hemoglobin 11.4 (L) 11.7 - 15.7 g/dL    Hematocrit 32.6 (L) 35.0 - 47.0 %    MCV 99 78 - 100 fL    MCH 34.7 (H) 26.5 - 33.0 pg    MCHC 35.0 31.5 - 36.5 g/dL    RDW 11.7 10.0 - 15.0 %    Platelet Count 149 (L) 150 - 450 10e3/uL    % Neutrophils 71 %    % Lymphocytes 13 %    % Monocytes 14 %    % Eosinophils 1 %    % Basophils 1 %    % Immature Granulocytes 0 %    NRBCs per 100 WBC 0 <1 /100    Absolute Neutrophils 3.6 1.6 - 8.3 10e3/uL    Absolute Lymphocytes 0.7 (L) 0.8 - 5.3 10e3/uL    Absolute Monocytes 0.7 0.0 - 1.3 10e3/uL    Absolute Eosinophils 0.0 0.0 - 0.7  "10e3/uL    Absolute Basophils 0.0 0.0 - 0.2 10e3/uL    Absolute Immature Granulocytes 0.0 <=0.4 10e3/uL    Absolute NRBCs 0.0 10e3/uL   XR Chest Port 1 View    Narrative    EXAM: XR CHEST PORT 1 VIEW  LOCATION: [facility]  DATE: 5/15/2025    INDICATION: Chest pain   COMPARISON: 9/22/2015       Impression    IMPRESSION: Multiple left rib fractures have developed since prior, but are now healed and mature. Mild degenerative changes developed in the acromioclavicular joints bilaterally. Chest otherwise negative.    UA with Microscopic reflex to Culture    Specimen: Urine, Clean Catch   Result Value Ref Range    Color Urine Light Yellow Colorless, Straw, Light Yellow, Yellow    Appearance Urine Clear Clear    Glucose Urine Negative Negative mg/dL    Bilirubin Urine Negative Negative    Ketones Urine Negative Negative mg/dL    Specific Gravity Urine 1.007 1.003 - 1.035    Blood Urine Negative Negative    pH Urine 5.5 5.0 - 7.0    Protein Albumin Urine Negative Negative mg/dL    Urobilinogen Urine Normal Normal mg/dL    Nitrite Urine Negative Negative    Leukocyte Esterase Urine Large (A) Negative    Bacteria Urine Few (A) None Seen /HPF    RBC Urine 0 <=2 /HPF    WBC Urine 39 (H) <=5 /HPF    Squamous Epithelials Urine 2 (H) <=1 /HPF    Narrative    Urine Culture ordered based on laboratory criteria   Troponin T, High Sensitivity   Result Value Ref Range    Troponin T, High Sensitivity 8 <=14 ng/L     EKG reviewed by me: Normal sinus rhythm with heart rate of 99.  CA interval 138 ms, QRS duration of 82 ms, QT interval 342 ms, normal axis.  No acute ST-T wave changes.  Impression: Normal ECG.  No significant change compared with previous EKG from January 2018.      {Sepsis/Septic Shock/Stemi/Stroke:675274::\" \"}      Impression     Final diagnoses:   None         ED Course & Medical Decision Making   Maranda Figueroa is a 55 year old female who presented to the emergency department with " "fatigue for the past 2 to 3 days, with tingling earlier in the day, brief loss of vision in the left eye, and chest tightness rated 3 out of 10 without any pleuritic symptoms.  Chest pain radiates into the left armpit and possibly into the left neck, although she has had some neck discomfort for the last 3 days.  She noticed high blood pressure at home in the range of 160/90.  She took her significant other's additional blood pressure medication around 5 PM.  She admits to drinking about 6 ounces of liquor at that time.  Patient's cardiac risk factors include hypertension, prediabetes.  Unknown hyperlipidemia    Vital signs reveal a temp of 98.2, blood pressure 161/97, 161/110, 153/90, heart rate of 98, respiration 20, 100% oxygen saturation.  Patient's EKG reveals normal sinus rhythm without any acute ischemic changes.  Her exam is unremarkable except for some mild epigastric tenderness with radiation into the chest.  She has no calf pain or ankle swelling.      {ED Addendum:772567::\" \"}    {Decision Rule Calculators:656363}            Written after-visit summary and instructions were given at the time of discharge.    Follow up Plan:   No follow-up provider specified.    Discharge Instructions:          Disclaimer: This note consists of words and symbols derived from keyboarding and dictation using voice recognition software.  As a result, there may be errors that have gone undetected.  Please consider this when interpreting information found in this note.    " [place] [address]  [phone]    If symptoms worsen      Discharge Instructions:   We did not find a worrisome cause for your chest pain.  You have signs of alcoholic liver disease and the alcohol is affecting your bone marrow with low hemoglobin and platelet counts.  Follow-up with Dr. Blackwood to help with your alcohol use disorder.  Start Macrobid twice a day for 7 days to treat urinary tract infection.  Double up on your omeprazole for the next 1 to 2 weeks.  Monitor your blood pressures more closely over the next 1 to 2 weeks.  Return to the emergency department at any time if you develop new or worsening symptoms.         Disclaimer: This note consists of words and symbols derived from keyboarding and dictation using voice recognition software.  As a result, there may be errors that have gone undetected.  Please consider this when interpreting information found in this note.       Nicholas Dao MD  05/16/25 075

## 2025-05-16 NOTE — DISCHARGE INSTRUCTIONS
We did not find a worrisome cause for your chest pain.  You have signs of alcoholic liver disease and the alcohol is affecting your bone marrow with low hemoglobin and platelet counts.  Follow-up with Dr. Blackwood to help with your alcohol use disorder.  Start Macrobid twice a day for 7 days to treat urinary tract infection.  Double up on your omeprazole for the next 1 to 2 weeks.  Monitor your blood pressures more closely over the next 1 to 2 weeks.  Return to the emergency department at any time if you develop new or worsening symptoms.

## 2025-05-17 LAB — BACTERIA UR CULT: ABNORMAL

## 2025-05-18 ENCOUNTER — TELEPHONE (OUTPATIENT)
Dept: NURSING | Facility: CLINIC | Age: 55
End: 2025-05-18
Payer: COMMERCIAL

## 2025-05-18 NOTE — TELEPHONE ENCOUNTER
[facility]    Reason for call: Lab Result Notification     Lab Result (including Rx patient on, if applicable).  If culture, copy of lab report at bottom.  Lab Result: Urine Culture  5/15/25 Prescribed NitroFURantoin macrocrystal-monohydrate (MACROBID) 100 MG capsule Take 1 capsule (100 mg) by mouth 2 times daily. - Oral (SUSCEPTIBLE)    Creatinine Level (mg/dl)   Creatinine   Date Value Ref Range Status   05/15/2025 1.05 (H) 0.51 - 0.95 mg/dL Final   01/24/2020 0.54 0.52 - 1.04 mg/dL Final    Creatinine clearance (ml/min), if applicable    Serum creatinine: 1.05 mg/dL (H) 05/15/25 1936  Estimated creatinine clearance: 55.5 mL/min (A)     RN Recommendations/Instructions per [facility] ED lab result protocol:   [facility] ED lab result protocol utilized: Urine Culture    5/15/25 Presented to ED with symptoms: fatigue, chest discomfort    Unable to reach patient/caregiver.   Left voicemail message requesting a call back to [phone] between 9 a.m. and 5:30 p.m. for patient's ED/UC lab results.  Letter pended to be sent via [employer].       Tereza Mcdaniel RN

## 2025-05-18 NOTE — LETTER
May 18, 2025        Maranda Figueroa  [id number] [place] [address]          Dear Maranda Figueroa:    You were seen in the [facility] at [facility] on 5/15/2025.  We are unable to reach you by phone, so we are sending you this letter.     It is important that you call [facility] lab result nurse at [phone], as we have information to relay to you AND/OR we MAY have to make some changes in your treatment.    Best time to call back is between 9AM and 5:30PM, 7 days a week.      Sincerely,     [facility] Lab Result RN  [phone]

## 2025-06-09 DIAGNOSIS — G43.711 INTRACTABLE CHRONIC MIGRAINE WITHOUT AURA AND WITH STATUS MIGRAINOSUS: ICD-10-CM

## 2025-06-09 DIAGNOSIS — I10 HTN, GOAL BELOW 140/90: ICD-10-CM

## 2025-06-10 RX ORDER — LOSARTAN POTASSIUM 100 MG/1
100 TABLET ORAL
Qty: 90 TABLET | Refills: 1 | Status: SHIPPED | OUTPATIENT
Start: 2025-06-10

## 2025-06-25 DIAGNOSIS — G43.711 INTRACTABLE CHRONIC MIGRAINE WITHOUT AURA AND WITH STATUS MIGRAINOSUS: ICD-10-CM

## 2025-06-25 DIAGNOSIS — F10.20 UNCOMPLICATED ALCOHOL DEPENDENCE (H): ICD-10-CM

## 2025-06-25 DIAGNOSIS — I10 HTN, GOAL BELOW 140/90: ICD-10-CM

## 2025-06-25 DIAGNOSIS — K21.9 GASTROESOPHAGEAL REFLUX DISEASE WITHOUT ESOPHAGITIS: Primary | ICD-10-CM

## 2025-06-25 RX ORDER — FOLIC ACID 0.4 MG
400 TABLET ORAL DAILY
Qty: 90 TABLET | Refills: 0 | Status: SHIPPED | OUTPATIENT
Start: 2025-06-25

## 2025-06-25 RX ORDER — LANOLIN ALCOHOL/MO/W.PET/CERES
100 CREAM (GRAM) TOPICAL DAILY
Qty: 90 TABLET | Refills: 0 | Status: SHIPPED | OUTPATIENT
Start: 2025-06-25

## 2025-06-25 RX ORDER — HYDROCHLOROTHIAZIDE 25 MG/1
25 TABLET ORAL DAILY
Qty: 90 TABLET | Refills: 1 | Status: SHIPPED | OUTPATIENT
Start: 2025-06-25

## 2025-07-01 RX ORDER — FAMOTIDINE 40 MG/1
40 TABLET, FILM COATED ORAL AT BEDTIME
Qty: 30 TABLET | Refills: 0 | Status: SHIPPED | OUTPATIENT
Start: 2025-07-01

## 2025-07-31 ENCOUNTER — APPOINTMENT (OUTPATIENT)
Dept: CT IMAGING | Facility: CLINIC | Age: 55
End: 2025-07-31
Attending: STUDENT IN AN ORGANIZED HEALTH CARE EDUCATION/TRAINING PROGRAM
Payer: COMMERCIAL

## 2025-07-31 ENCOUNTER — HOSPITAL ENCOUNTER (INPATIENT)
Facility: CLINIC | Age: 55
End: 2025-07-31
Attending: STUDENT IN AN ORGANIZED HEALTH CARE EDUCATION/TRAINING PROGRAM
Payer: COMMERCIAL

## 2025-07-31 ENCOUNTER — APPOINTMENT (OUTPATIENT)
Dept: GENERAL RADIOLOGY | Facility: CLINIC | Age: 55
End: 2025-07-31
Attending: STUDENT IN AN ORGANIZED HEALTH CARE EDUCATION/TRAINING PROGRAM
Payer: COMMERCIAL

## 2025-07-31 VITALS
TEMPERATURE: 98.1 F | RESPIRATION RATE: 16 BRPM | SYSTOLIC BLOOD PRESSURE: 131 MMHG | BODY MASS INDEX: 22.31 KG/M2 | HEART RATE: 83 BPM | OXYGEN SATURATION: 98 % | DIASTOLIC BLOOD PRESSURE: 89 MMHG | WEIGHT: 122 LBS

## 2025-07-31 DIAGNOSIS — F10.930 ALCOHOL WITHDRAWAL SEIZURE WITHOUT COMPLICATION (H): Primary | ICD-10-CM

## 2025-07-31 DIAGNOSIS — R56.9 ALCOHOL WITHDRAWAL SEIZURE WITHOUT COMPLICATION (H): Primary | ICD-10-CM

## 2025-07-31 DIAGNOSIS — F10.939 ALCOHOL WITHDRAWAL, WITH UNSPECIFIED COMPLICATION (H): ICD-10-CM

## 2025-07-31 DIAGNOSIS — E83.42 HYPOMAGNESEMIA: ICD-10-CM

## 2025-07-31 DIAGNOSIS — F10.930 SEIZURE DUE TO ALCOHOL WITHDRAWAL, UNCOMPLICATED (H): ICD-10-CM

## 2025-07-31 DIAGNOSIS — R56.9 SEIZURE DUE TO ALCOHOL WITHDRAWAL, UNCOMPLICATED (H): ICD-10-CM

## 2025-07-31 LAB
ALBUMIN SERPL BCG-MCNC: 4.6 G/DL (ref 3.5–5.2)
ALP SERPL-CCNC: 103 U/L (ref 40–150)
ALT SERPL W P-5'-P-CCNC: 69 U/L (ref 0–50)
ANION GAP SERPL CALCULATED.3IONS-SCNC: 14 MMOL/L (ref 7–15)
AST SERPL W P-5'-P-CCNC: 109 U/L (ref 0–45)
ATRIAL RATE - MUSE: 85 BPM
BASOPHILS # BLD AUTO: 0 10E3/UL (ref 0–0.2)
BASOPHILS NFR BLD AUTO: 1 %
BILIRUB SERPL-MCNC: 0.8 MG/DL
BUN SERPL-MCNC: 19.1 MG/DL (ref 6–20)
CALCIUM SERPL-MCNC: 9.8 MG/DL (ref 8.8–10.4)
CHLORIDE SERPL-SCNC: 101 MMOL/L (ref 98–107)
CK SERPL-CCNC: 216 U/L (ref 26–192)
CREAT SERPL-MCNC: 0.87 MG/DL (ref 0.51–0.95)
DIASTOLIC BLOOD PRESSURE - MUSE: NORMAL MMHG
EGFRCR SERPLBLD CKD-EPI 2021: 79 ML/MIN/1.73M2
EOSINOPHIL # BLD AUTO: 0 10E3/UL (ref 0–0.7)
EOSINOPHIL NFR BLD AUTO: 0 %
ERYTHROCYTE [DISTWIDTH] IN BLOOD BY AUTOMATED COUNT: 12.3 % (ref 10–15)
ETHANOL SERPL-MCNC: <0.01 G/DL
GLUCOSE SERPL-MCNC: 158 MG/DL (ref 70–99)
HCO3 SERPL-SCNC: 26 MMOL/L (ref 22–29)
HCT VFR BLD AUTO: 30.4 % (ref 35–47)
HGB BLD-MCNC: 10.5 G/DL (ref 11.7–15.7)
IMM GRANULOCYTES # BLD: 0 10E3/UL
IMM GRANULOCYTES NFR BLD: 0 %
INTERPRETATION ECG - MUSE: NORMAL
LACTATE SERPL-SCNC: 1.2 MMOL/L (ref 0.7–2)
LIPASE SERPL-CCNC: 48 U/L (ref 13–60)
LYMPHOCYTES # BLD AUTO: 0.3 10E3/UL (ref 0.8–5.3)
LYMPHOCYTES NFR BLD AUTO: 9 %
MAGNESIUM SERPL-MCNC: 1.4 MG/DL (ref 1.7–2.3)
MCH RBC QN AUTO: 35.4 PG (ref 26.5–33)
MCHC RBC AUTO-ENTMCNC: 34.5 G/DL (ref 31.5–36.5)
MCV RBC AUTO: 102 FL (ref 78–100)
MONOCYTES # BLD AUTO: 0.4 10E3/UL (ref 0–1.3)
MONOCYTES NFR BLD AUTO: 12 %
NEUTROPHILS # BLD AUTO: 2.6 10E3/UL (ref 1.6–8.3)
NEUTROPHILS NFR BLD AUTO: 79 %
NRBC # BLD AUTO: 0 10E3/UL
NRBC BLD AUTO-RTO: 0 /100
P AXIS - MUSE: 58 DEGREES
PLATELET # BLD AUTO: 119 10E3/UL (ref 150–450)
POTASSIUM SERPL-SCNC: 3.9 MMOL/L (ref 3.4–5.3)
PR INTERVAL - MUSE: 142 MS
PROT SERPL-MCNC: 7 G/DL (ref 6.4–8.3)
QRS DURATION - MUSE: 78 MS
QT - MUSE: 368 MS
QTC - MUSE: 437 MS
R AXIS - MUSE: 25 DEGREES
RBC # BLD AUTO: 2.97 10E6/UL (ref 3.8–5.2)
SODIUM SERPL-SCNC: 141 MMOL/L (ref 135–145)
SYSTOLIC BLOOD PRESSURE - MUSE: NORMAL MMHG
T AXIS - MUSE: 23 DEGREES
TROPONIN T SERPL HS-MCNC: 21 NG/L
TROPONIN T SERPL HS-MCNC: 23 NG/L
VENTRICULAR RATE- MUSE: 85 BPM
WBC # BLD AUTO: 3.4 10E3/UL (ref 4–11)

## 2025-07-31 PROCEDURE — 93005 ELECTROCARDIOGRAM TRACING: CPT | Performed by: STUDENT IN AN ORGANIZED HEALTH CARE EDUCATION/TRAINING PROGRAM

## 2025-07-31 PROCEDURE — 120N000001 HC R&B MED SURG/OB

## 2025-07-31 PROCEDURE — 85025 COMPLETE CBC W/AUTO DIFF WBC: CPT | Performed by: STUDENT IN AN ORGANIZED HEALTH CARE EDUCATION/TRAINING PROGRAM

## 2025-07-31 PROCEDURE — 85004 AUTOMATED DIFF WBC COUNT: CPT | Performed by: STUDENT IN AN ORGANIZED HEALTH CARE EDUCATION/TRAINING PROGRAM

## 2025-07-31 PROCEDURE — 96366 THER/PROPH/DIAG IV INF ADDON: CPT | Performed by: STUDENT IN AN ORGANIZED HEALTH CARE EDUCATION/TRAINING PROGRAM

## 2025-07-31 PROCEDURE — 258N000003 HC RX IP 258 OP 636: Performed by: STUDENT IN AN ORGANIZED HEALTH CARE EDUCATION/TRAINING PROGRAM

## 2025-07-31 PROCEDURE — 71275 CT ANGIOGRAPHY CHEST: CPT

## 2025-07-31 PROCEDURE — 99223 1ST HOSP IP/OBS HIGH 75: CPT | Mod: AI | Performed by: INTERNAL MEDICINE

## 2025-07-31 PROCEDURE — 82040 ASSAY OF SERUM ALBUMIN: CPT | Performed by: STUDENT IN AN ORGANIZED HEALTH CARE EDUCATION/TRAINING PROGRAM

## 2025-07-31 PROCEDURE — HZ2ZZZZ DETOXIFICATION SERVICES FOR SUBSTANCE ABUSE TREATMENT: ICD-10-PCS | Performed by: STUDENT IN AN ORGANIZED HEALTH CARE EDUCATION/TRAINING PROGRAM

## 2025-07-31 PROCEDURE — 83690 ASSAY OF LIPASE: CPT | Performed by: STUDENT IN AN ORGANIZED HEALTH CARE EDUCATION/TRAINING PROGRAM

## 2025-07-31 PROCEDURE — 70450 CT HEAD/BRAIN W/O DYE: CPT

## 2025-07-31 PROCEDURE — 96365 THER/PROPH/DIAG IV INF INIT: CPT | Performed by: STUDENT IN AN ORGANIZED HEALTH CARE EDUCATION/TRAINING PROGRAM

## 2025-07-31 PROCEDURE — 84484 ASSAY OF TROPONIN QUANT: CPT | Performed by: STUDENT IN AN ORGANIZED HEALTH CARE EDUCATION/TRAINING PROGRAM

## 2025-07-31 PROCEDURE — 250N000013 HC RX MED GY IP 250 OP 250 PS 637: Performed by: STUDENT IN AN ORGANIZED HEALTH CARE EDUCATION/TRAINING PROGRAM

## 2025-07-31 PROCEDURE — 96375 TX/PRO/DX INJ NEW DRUG ADDON: CPT | Performed by: STUDENT IN AN ORGANIZED HEALTH CARE EDUCATION/TRAINING PROGRAM

## 2025-07-31 PROCEDURE — 250N000013 HC RX MED GY IP 250 OP 250 PS 637: Performed by: INTERNAL MEDICINE

## 2025-07-31 PROCEDURE — 99285 EMERGENCY DEPT VISIT HI MDM: CPT | Performed by: STUDENT IN AN ORGANIZED HEALTH CARE EDUCATION/TRAINING PROGRAM

## 2025-07-31 PROCEDURE — 93010 ELECTROCARDIOGRAM REPORT: CPT | Performed by: STUDENT IN AN ORGANIZED HEALTH CARE EDUCATION/TRAINING PROGRAM

## 2025-07-31 PROCEDURE — 36415 COLL VENOUS BLD VENIPUNCTURE: CPT | Performed by: STUDENT IN AN ORGANIZED HEALTH CARE EDUCATION/TRAINING PROGRAM

## 2025-07-31 PROCEDURE — 72125 CT NECK SPINE W/O DYE: CPT

## 2025-07-31 PROCEDURE — 250N000011 HC RX IP 250 OP 636: Performed by: STUDENT IN AN ORGANIZED HEALTH CARE EDUCATION/TRAINING PROGRAM

## 2025-07-31 PROCEDURE — 96361 HYDRATE IV INFUSION ADD-ON: CPT | Performed by: STUDENT IN AN ORGANIZED HEALTH CARE EDUCATION/TRAINING PROGRAM

## 2025-07-31 PROCEDURE — 96376 TX/PRO/DX INJ SAME DRUG ADON: CPT | Performed by: STUDENT IN AN ORGANIZED HEALTH CARE EDUCATION/TRAINING PROGRAM

## 2025-07-31 PROCEDURE — 80053 COMPREHEN METABOLIC PANEL: CPT | Performed by: STUDENT IN AN ORGANIZED HEALTH CARE EDUCATION/TRAINING PROGRAM

## 2025-07-31 PROCEDURE — 82550 ASSAY OF CK (CPK): CPT | Performed by: STUDENT IN AN ORGANIZED HEALTH CARE EDUCATION/TRAINING PROGRAM

## 2025-07-31 PROCEDURE — 73070 X-RAY EXAM OF ELBOW: CPT | Mod: LT

## 2025-07-31 PROCEDURE — 83605 ASSAY OF LACTIC ACID: CPT | Performed by: STUDENT IN AN ORGANIZED HEALTH CARE EDUCATION/TRAINING PROGRAM

## 2025-07-31 PROCEDURE — 250N000009 HC RX 250: Performed by: STUDENT IN AN ORGANIZED HEALTH CARE EDUCATION/TRAINING PROGRAM

## 2025-07-31 PROCEDURE — 99285 EMERGENCY DEPT VISIT HI MDM: CPT | Mod: 25 | Performed by: STUDENT IN AN ORGANIZED HEALTH CARE EDUCATION/TRAINING PROGRAM

## 2025-07-31 PROCEDURE — 82077 ASSAY SPEC XCP UR&BREATH IA: CPT | Performed by: STUDENT IN AN ORGANIZED HEALTH CARE EDUCATION/TRAINING PROGRAM

## 2025-07-31 PROCEDURE — 99291 CRITICAL CARE FIRST HOUR: CPT | Mod: 25 | Performed by: STUDENT IN AN ORGANIZED HEALTH CARE EDUCATION/TRAINING PROGRAM

## 2025-07-31 PROCEDURE — 83735 ASSAY OF MAGNESIUM: CPT | Performed by: STUDENT IN AN ORGANIZED HEALTH CARE EDUCATION/TRAINING PROGRAM

## 2025-07-31 RX ORDER — FLUMAZENIL 0.1 MG/ML
0.2 INJECTION, SOLUTION INTRAVENOUS
Status: ACTIVE | OUTPATIENT
Start: 2025-07-31

## 2025-07-31 RX ORDER — OMEPRAZOLE 20 MG/1
20 CAPSULE, DELAYED RELEASE ORAL
Status: ON HOLD | COMMUNITY

## 2025-07-31 RX ORDER — BUPROPION HYDROCHLORIDE 150 MG/1
150 TABLET, EXTENDED RELEASE ORAL 2 TIMES DAILY
Status: DISPENSED | OUTPATIENT
Start: 2025-07-31

## 2025-07-31 RX ORDER — LIDOCAINE 40 MG/G
CREAM TOPICAL
Status: ACTIVE | OUTPATIENT
Start: 2025-07-31

## 2025-07-31 RX ORDER — HALOPERIDOL 5 MG/ML
2.5-5 INJECTION INTRAMUSCULAR EVERY 6 HOURS PRN
Status: ACTIVE | OUTPATIENT
Start: 2025-07-31

## 2025-07-31 RX ORDER — GABAPENTIN 300 MG/1
900 CAPSULE ORAL EVERY 8 HOURS
Status: ACTIVE | OUTPATIENT
Start: 2025-08-01 | End: 2025-08-04

## 2025-07-31 RX ORDER — AMOXICILLIN 250 MG
2 CAPSULE ORAL 2 TIMES DAILY PRN
Status: ACTIVE | OUTPATIENT
Start: 2025-07-31

## 2025-07-31 RX ORDER — GABAPENTIN 300 MG/1
1200 CAPSULE ORAL ONCE
Status: COMPLETED | OUTPATIENT
Start: 2025-07-31 | End: 2025-07-31

## 2025-07-31 RX ORDER — NALOXONE HYDROCHLORIDE 0.4 MG/ML
0.4 INJECTION, SOLUTION INTRAMUSCULAR; INTRAVENOUS; SUBCUTANEOUS
Status: ACTIVE | OUTPATIENT
Start: 2025-07-31

## 2025-07-31 RX ORDER — ACETAMINOPHEN 325 MG/1
650 TABLET ORAL EVERY 4 HOURS PRN
Status: ACTIVE | OUTPATIENT
Start: 2025-07-31

## 2025-07-31 RX ORDER — ONDANSETRON 2 MG/ML
4 INJECTION INTRAMUSCULAR; INTRAVENOUS EVERY 6 HOURS PRN
Status: ACTIVE | OUTPATIENT
Start: 2025-07-31

## 2025-07-31 RX ORDER — OLANZAPINE 5 MG/1
5-10 TABLET, ORALLY DISINTEGRATING ORAL EVERY 6 HOURS PRN
Status: ACTIVE | OUTPATIENT
Start: 2025-07-31

## 2025-07-31 RX ORDER — IOPAMIDOL 755 MG/ML
500 INJECTION, SOLUTION INTRAVASCULAR ONCE
Status: COMPLETED | OUTPATIENT
Start: 2025-07-31 | End: 2025-07-31

## 2025-07-31 RX ORDER — ACETAMINOPHEN 650 MG/1
650 SUPPOSITORY RECTAL EVERY 4 HOURS PRN
Status: ACTIVE | OUTPATIENT
Start: 2025-07-31

## 2025-07-31 RX ORDER — DIAZEPAM 5 MG/1
10 TABLET ORAL EVERY 30 MIN PRN
Status: ACTIVE | OUTPATIENT
Start: 2025-07-31

## 2025-07-31 RX ORDER — NALOXONE HYDROCHLORIDE 0.4 MG/ML
0.2 INJECTION, SOLUTION INTRAMUSCULAR; INTRAVENOUS; SUBCUTANEOUS
Status: ACTIVE | OUTPATIENT
Start: 2025-07-31

## 2025-07-31 RX ORDER — FOLIC ACID 5 MG/ML
1 INJECTION, SOLUTION INTRAMUSCULAR; INTRAVENOUS; SUBCUTANEOUS DAILY
Status: DISPENSED | OUTPATIENT
Start: 2025-07-31

## 2025-07-31 RX ORDER — PANTOPRAZOLE SODIUM 40 MG/1
40 TABLET, DELAYED RELEASE ORAL DAILY
Status: ACTIVE | OUTPATIENT
Start: 2025-08-01

## 2025-07-31 RX ORDER — DIAZEPAM 10 MG/2ML
5 INJECTION, SOLUTION INTRAMUSCULAR; INTRAVENOUS ONCE
Status: COMPLETED | OUTPATIENT
Start: 2025-07-31 | End: 2025-07-31

## 2025-07-31 RX ORDER — AMOXICILLIN 250 MG
1 CAPSULE ORAL 2 TIMES DAILY PRN
Status: ACTIVE | OUTPATIENT
Start: 2025-07-31

## 2025-07-31 RX ORDER — MAGNESIUM SULFATE HEPTAHYDRATE 40 MG/ML
2 INJECTION, SOLUTION INTRAVENOUS ONCE
Status: COMPLETED | OUTPATIENT
Start: 2025-07-31 | End: 2025-07-31

## 2025-07-31 RX ORDER — DIAZEPAM 10 MG/2ML
5-10 INJECTION, SOLUTION INTRAMUSCULAR; INTRAVENOUS EVERY 30 MIN PRN
Status: ACTIVE | OUTPATIENT
Start: 2025-07-31

## 2025-07-31 RX ORDER — CALCIUM CARBONATE 500 MG/1
1000 TABLET, CHEWABLE ORAL 4 TIMES DAILY PRN
Status: ACTIVE | OUTPATIENT
Start: 2025-07-31

## 2025-07-31 RX ORDER — THIAMINE HYDROCHLORIDE 100 MG/ML
100 INJECTION, SOLUTION INTRAMUSCULAR; INTRAVENOUS DAILY
Status: ACTIVE | OUTPATIENT
Start: 2025-07-31 | End: 2025-08-05

## 2025-07-31 RX ORDER — ONDANSETRON 4 MG/1
4 TABLET, ORALLY DISINTEGRATING ORAL EVERY 6 HOURS PRN
Status: ACTIVE | OUTPATIENT
Start: 2025-07-31

## 2025-07-31 RX ORDER — OXYCODONE HYDROCHLORIDE 5 MG/1
5 TABLET ORAL EVERY 4 HOURS PRN
Status: ACTIVE | OUTPATIENT
Start: 2025-07-31

## 2025-07-31 RX ORDER — GABAPENTIN 100 MG/1
100 CAPSULE ORAL EVERY 8 HOURS
Status: ACTIVE | OUTPATIENT
Start: 2025-08-08 | End: 2025-08-11

## 2025-07-31 RX ORDER — HYDROCHLOROTHIAZIDE 25 MG/1
25 TABLET ORAL DAILY
Status: ACTIVE | OUTPATIENT
Start: 2025-08-01

## 2025-07-31 RX ORDER — GABAPENTIN 300 MG/1
600 CAPSULE ORAL EVERY 8 HOURS
Status: ACTIVE | OUTPATIENT
Start: 2025-08-04 | End: 2025-08-06

## 2025-07-31 RX ORDER — CLONIDINE HYDROCHLORIDE 0.1 MG/1
0.1 TABLET ORAL EVERY 8 HOURS
Status: DISPENSED | OUTPATIENT
Start: 2025-07-31

## 2025-07-31 RX ORDER — GABAPENTIN 300 MG/1
300 CAPSULE ORAL EVERY 8 HOURS
Status: ACTIVE | OUTPATIENT
Start: 2025-08-06 | End: 2025-08-08

## 2025-07-31 RX ORDER — LOSARTAN POTASSIUM 50 MG/1
100 TABLET ORAL DAILY
Status: ACTIVE | OUTPATIENT
Start: 2025-08-01

## 2025-07-31 RX ORDER — MULTIPLE VITAMINS W/ MINERALS TAB 9MG-400MCG
1 TAB ORAL DAILY
Status: DISPENSED | OUTPATIENT
Start: 2025-07-31

## 2025-07-31 RX ORDER — DIAZEPAM 10 MG/2ML
5 INJECTION, SOLUTION INTRAMUSCULAR; INTRAVENOUS ONCE
Status: DISPENSED | OUTPATIENT
Start: 2025-07-31

## 2025-07-31 RX ORDER — LOSARTAN POTASSIUM 100 MG/1
100 TABLET ORAL DAILY
Status: ON HOLD | COMMUNITY

## 2025-07-31 RX ADMIN — IOPAMIDOL 51 ML: 755 INJECTION, SOLUTION INTRAVENOUS at 19:03

## 2025-07-31 RX ADMIN — MAGNESIUM SULFATE HEPTAHYDRATE 2 G: 40 INJECTION, SOLUTION INTRAVENOUS at 17:55

## 2025-07-31 RX ADMIN — GABAPENTIN 1200 MG: 300 CAPSULE ORAL at 23:31

## 2025-07-31 RX ADMIN — CLONIDINE HYDROCHLORIDE 0.1 MG: 0.1 TABLET ORAL at 23:30

## 2025-07-31 RX ADMIN — FOLIC ACID 1 MG: 5 INJECTION, SOLUTION INTRAMUSCULAR; INTRAVENOUS; SUBCUTANEOUS at 16:25

## 2025-07-31 RX ADMIN — SODIUM CHLORIDE 70 ML: 9 INJECTION, SOLUTION INTRAVENOUS at 19:03

## 2025-07-31 RX ADMIN — CLONIDINE HYDROCHLORIDE 0.1 MG: 0.1 TABLET ORAL at 20:28

## 2025-07-31 RX ADMIN — BUPROPION HYDROCHLORIDE 150 MG: 150 TABLET, FILM COATED, EXTENDED RELEASE ORAL at 23:31

## 2025-07-31 RX ADMIN — DIAZEPAM 10 MG: 10 INJECTION, SOLUTION INTRAMUSCULAR; INTRAVENOUS at 16:17

## 2025-07-31 RX ADMIN — SODIUM CHLORIDE 1000 ML: 0.9 INJECTION, SOLUTION INTRAVENOUS at 16:18

## 2025-07-31 RX ADMIN — Medication 1 TABLET: at 20:28

## 2025-07-31 RX ADMIN — THIAMINE HYDROCHLORIDE 100 MG: 100 INJECTION, SOLUTION INTRAMUSCULAR; INTRAVENOUS at 17:17

## 2025-07-31 RX ADMIN — DIAZEPAM 5 MG: 10 INJECTION, SOLUTION INTRAMUSCULAR; INTRAVENOUS at 17:49

## 2025-07-31 ASSESSMENT — ACTIVITIES OF DAILY LIVING (ADL)
ADLS_ACUITY_SCORE: 41
ADLS_ACUITY_SCORE: 15

## 2025-07-31 ASSESSMENT — LIFESTYLE VARIABLES
TREMOR: 3
AUDITORY DISTURBANCES: NOT PRESENT
ANXIETY: NO ANXIETY, AT EASE
ANXIETY: MILDLY ANXIOUS
AGITATION: SOMEWHAT MORE THAN NORMAL ACTIVITY
PAROXYSMAL SWEATS: NO SWEAT VISIBLE
ORIENTATION AND CLOUDING OF SENSORIUM: ORIENTED AND CAN DO SERIAL ADDITIONS
TOTAL SCORE: 17
TOTAL SCORE: 7
AGITATION: NORMAL ACTIVITY
VISUAL DISTURBANCES: VERY MILD SENSITIVITY
ORIENTATION AND CLOUDING OF SENSORIUM: ORIENTED AND CAN DO SERIAL ADDITIONS
PAROXYSMAL SWEATS: NO SWEAT VISIBLE
HEADACHE, FULLNESS IN HEAD: NOT PRESENT
VISUAL DISTURBANCES: NOT PRESENT
ANXIETY: MODERATELY ANXIOUS, OR GUARDED, SO ANXIETY IS INFERRED
NAUSEA AND VOMITING: 2
AUDITORY DISTURBANCES: NOT PRESENT
VISUAL DISTURBANCES: MODERATE SENSITIVITY
AUDITORY DISTURBANCES: NOT PRESENT
TACTILE DISTURBANCES: VERY MILD ITCHING, PINS AND NEEDLES, BURNING OR NUMBNESS
TREMOR: 2
ORIENTATION AND CLOUDING OF SENSORIUM: ORIENTED AND CAN DO SERIAL ADDITIONS
NAUSEA AND VOMITING: 2
HEADACHE, FULLNESS IN HEAD: NOT PRESENT
NAUSEA AND VOMITING: INTERMITTENT NAUSEA WITH DRY HEAVES
AGITATION: NORMAL ACTIVITY
HEADACHE, FULLNESS IN HEAD: VERY MILD
TREMOR: MODERATE, WITH PATIENT'S ARMS EXTENDED
PAROXYSMAL SWEATS: BARELY PERCEPTIBLE SWEATING, PALMS MOIST
TOTAL SCORE: 6

## 2025-07-31 ASSESSMENT — COLUMBIA-SUICIDE SEVERITY RATING SCALE - C-SSRS
2. HAVE YOU ACTUALLY HAD ANY THOUGHTS OF KILLING YOURSELF IN THE PAST MONTH?: NO
1. IN THE PAST MONTH, HAVE YOU WISHED YOU WERE DEAD OR WISHED YOU COULD GO TO SLEEP AND NOT WAKE UP?: NO
6. HAVE YOU EVER DONE ANYTHING, STARTED TO DO ANYTHING, OR PREPARED TO DO ANYTHING TO END YOUR LIFE?: NO

## 2025-07-31 NOTE — MEDICATION SCRIBE - ADMISSION MEDICATION HISTORY
Medication Scribe Admission Medication History    Admission medication history is complete. The information provided in this note is only as accurate as the sources available at the time of the update.    Information Source(s): Patient and CareEverywhere/SureScripts via in-person    Pertinent Information: Verified no use of pain pump, inhalers or prescription eye drops currently.      Changes made to PTA medication list:  Added: None  Deleted: pepcid, folic acid, macrobid, prilosec, maxalt  Changed: take omeprazole at 2pm    Allergies reviewed with patient and updates made in EHR: yes    Medication History Completed By: MARÍA PANG 7/31/2025 5:33 PM    PTA Med List   Medication Sig Last Dose/Taking    buPROPion (WELLBUTRIN SR) 150 MG 12 hr tablet TAKE ONE TABLET BY MOUTH TWICE A DAY 7/30/2025 Morning    hydrochlorothiazide (HYDRODIURIL) 25 MG tablet TAKE ONE TABLET BY MOUTH ONCE DAILY 7/31/2025 Morning    losartan (COZAAR) 100 MG tablet Take 100 mg by mouth daily. 7/31/2025 Morning    omeprazole (PRILOSEC) 20 MG DR capsule Take 20 mg by mouth daily at 2 pm. 7/31/2025 at  1:00 PM    thiamine (B-1) 100 MG tablet TAKE ONE TABLET BY MOUTH ONCE DAILY 7/31/2025 Morning

## 2025-07-31 NOTE — ED TRIAGE NOTES
Patient woke up around 0630 not feeling well, shaky, nauseous. Has vomited about 6 times. Went outside about 25 minutes ago and  found her on the concrete passed out. Small bleeding to back of head. R elbow bruised/abrasion. He reports she was only out for a few seconds. Admits to daily alcohol use.     Triage Assessment (Adult)       Row Name 07/31/25 1458          Triage Assessment    Airway WDL WDL        Respiratory WDL    Respiratory WDL WDL        Skin Circulation/Temperature WDL    Skin Circulation/Temperature WDL WDL            90

## 2025-07-31 NOTE — ED PROVIDER NOTES
History     Chief Complaint   Patient presents with    Syncope    Head Injury     HPI  Maranda Figueroa is a 55 year old female who presents the emergency department for an unconscious episode she had at home.  She states she was not feeling well throughout the day today.  Vomited roughly 6 time nonbloody nonbilious.  She has felt sweaty and shaky today.  No documented fevers.  She was then subsequently found on the ground outside, she does not remember the events that took place leading to this.  The fall itself was unwitnessed however she has abrasions to the bilateral elbows and bilateral elbow pain.  She also has bleeding from the back of the scalp.  She states she has a daily alcohol user and roughly 24 hours ago had her last drink.  She has had alcohol withdrawal in the past but no alcohol withdrawal seizures.  She does not know if she was incontinent of the urine.    Allergies:  Allergies   Allergen Reactions    Penicillins Nausea and Vomiting       Problem List:    Patient Active Problem List    Diagnosis Date Noted    Alcohol withdrawal (H) 07/31/2025     Priority: Medium    Seizure-like activity (H) 07/31/2025     Priority: Medium    Alcohol withdrawal seizure without complication (H) 07/31/2025     Priority: Medium    Major depression, recurrent (H) 09/18/2024     Priority: Medium    Intractable chronic migraine without aura and with status migrainosus 03/01/2018     Priority: Medium    Gastroesophageal reflux disease without esophagitis 04/20/2016     Priority: Medium    Panic attack 04/20/2016     Priority: Medium    Vitamin D deficiency 02/10/2016     Priority: Medium    HTN, goal below 140/90 10/07/2015     Priority: Medium    Major depression in complete remission (H) 11/19/2013     Priority: Medium    Unspecified hearing loss, unspecified ear 11/19/2013     Priority: Medium     Left ear      Insomnia 11/19/2013     Priority: Medium    Anxiety state 05/24/2006     Priority: Medium     Problem list  name updated by automated process. Provider to review          Past Medical History:    Past Medical History:   Diagnosis Date    Anxiety state, unspecified     Depressive disorder, not elsewhere classified     Unspecified conductive hearing loss     Vitamin D deficiency 2/10/2016       Past Surgical History:    Past Surgical History:   Procedure Laterality Date    ESOPHAGOSCOPY, GASTROSCOPY, DUODENOSCOPY (EGD), COMBINED N/A 10/5/2015    Procedure: COMBINED ESOPHAGOSCOPY, GASTROSCOPY, DUODENOSCOPY (EGD), BIOPSY SINGLE OR MULTIPLE;  Surgeon: Calin Kenyon MD;  Location: [facility]    OPEN REDUCTION INTERNAL FIXATION RODDING INTRAMEDULLARY TIBIA  2011    Procedure:OPEN REDUCTION INTERNAL FIXATION RODDING INTRAMEDULLARY TIBIA; Open Reduction internal fixation rodding right tibia; Surgeon:JUNO TUTTLE; Location:[facility]    TYMPANOPLASTY, RT/LT      Tympanoplasty/LT       Family History:    Family History   Problem Relation Age of Onset    Diabetes Mother     Hypertension Mother     Thyroid Disease Mother     Cancer - colorectal Mother     Diabetes Father     Hypertension Father     Heart Disease Maternal Grandmother     Diabetes Maternal Grandfather        Social History:  Marital Status:   [2]  Social History     Tobacco Use    Smoking status: Former     Current packs/day: 0.00     Types: Cigarettes     Quit date: 1994     Years since quittin.1    Smokeless tobacco: Never   Vaping Use    Vaping status: Never Used   Substance Use Topics    Alcohol use: Yes     Alcohol/week: 1.7 standard drinks of alcohol     Comment: daily    Drug use: No        Medications:    buPROPion (WELLBUTRIN SR) 150 MG 12 hr tablet  hydrochlorothiazide (HYDRODIURIL) 25 MG tablet  losartan (COZAAR) 100 MG tablet  omeprazole (PRILOSEC) 20 MG DR capsule  thiamine (B-1) 100 MG tablet          Review of Systems  Gen: No fevers, no unintentional weight changes  Head and neck: No headache, no neck pain  Eye: No eye pain, no  vision changes  Respiratory: No shortness of breath, no cough  Cardiovascular: No chest pain, no palpitations  Abdominal: no constipation, no diarrhea  Urinary: No dysuria, no hematuria  Musculoskeletal: No joint redness, no trauma  Neurologic: No confusion, no weakness, no sensation changes  Psychiatric: No suicidal ideation, no homicidal ideation    Physical Exam   BP: (!) 154/103  Pulse: 109  Temp: 98  F (36.7  C)  Resp: 18  Weight: 55.3 kg (122 lb)  SpO2: 100 %      Physical Exam  Gen: Awake, alert, no distress  Head: Laceration to the posterior scalp, no appreciated irregularities of the skull  Face: No tenderness, no gross lesions, no bruising  Eyes: Conjunctiva clear,Pupils are equal round and reactive bilaterally  Ears: No gross evidence of external trauma, no gross blood,   Nose: Grossly midline, no bleeding, no septal hematoma, no CSF leak  Mouth: No appreciated lacerations or bruising, no tender or missing/fractured teeth, posterior pharynx open and clear, bite to the right lateral aspect of the tongue  Neck: Trachea grossly midline, no bruising, no subcutaneous emphysema, no tenderness, no cervical spine tenderness, no c-collar  Chest: No gross bruising, symmetrical chest wall movement, no gross lesions, no tenderness, S1 and S2 cardiac sounds appreciated, lungs clear bilaterally, no respiratory distress  Abdomen: No gross lesions, no tenderness, no ecchymosis, no distention  Pelvis: Stable without tenderness  Right upper extremity: No joint or bony tenderness, painless range of motion, intact neurovascular exam, no skin lesions  Left upper extremity: No joint or bony tenderness, painless range of motion, intact neurovascular exam, 1 cm abrasion to the posterior aspect of the elbow  Right lower extremity: No joint or bony tenderness, painless range of motion, intact neurovascular exam, 1 cm abrasion to the posterior aspect of the elbow  Left lower extremity: No joint or bony tenderness, painless range of  motion, intact neurovascular exam, no skin lesions  Back: no midline tenderness, no skin lesions       ED Course        Procedures                  Recent Results (from the past 24 hours)   CBC with Platelets and Differential (Limited Occurrences)    Narrative    The following orders were created for panel order CBC with Platelets and Differential (Limited Occurrences).  Procedure                               Abnormality         Status                     ---------                               -----------         ------                     CBC with platelets and ...[[id number]]  Abnormal            Final result                 Please view results for these tests on the individual orders.   Comprehensive Metabolic Panel (Limited Occurrences)   Result Value Ref Range    Sodium 141 135 - 145 mmol/L    Potassium 3.9 3.4 - 5.3 mmol/L    Carbon Dioxide (CO2) 26 22 - 29 mmol/L    Anion Gap 14 7 - 15 mmol/L    Urea Nitrogen 19.1 6.0 - 20.0 mg/dL    Creatinine 0.87 0.51 - 0.95 mg/dL    GFR Estimate 79 >60 mL/min/1.73m2    Calcium 9.8 8.8 - 10.4 mg/dL    Chloride 101 98 - 107 mmol/L    Glucose 158 (H) 70 - 99 mg/dL    Alkaline Phosphatase 103 40 - 150 U/L     (H) 0 - 45 U/L    ALT 69 (H) 0 - 50 U/L    Protein Total 7.0 6.4 - 8.3 g/dL    Albumin 4.6 3.5 - 5.2 g/dL    Bilirubin Total 0.8 <=1.2 mg/dL   Lipase   Result Value Ref Range    Lipase 48 13 - 60 U/L   Lactic acid whole blood with 1x repeat in 2 hr when >2   Result Value Ref Range    Lactic Acid, Initial 1.2 0.7 - 2.0 mmol/L   Troponin T, High Sensitivity   Result Value Ref Range    Troponin T, High Sensitivity 23 (H) <=14 ng/L   Magnesium (Limited Occurrences)   Result Value Ref Range    Magnesium 1.4 (L) 1.7 - 2.3 mg/dL   Ethanol Level Blood   Result Value Ref Range    Ethanol Level Blood <0.01 <=0.01 g/dL   CK total   Result Value Ref Range     (H) 26 - 192 U/L   CBC with platelets and differential   Result Value Ref Range    WBC Count 3.4 (L) 4.0 -  11.0 10e3/uL    RBC Count 2.97 (L) 3.80 - 5.20 10e6/uL    Hemoglobin 10.5 (L) 11.7 - 15.7 g/dL    Hematocrit 30.4 (L) 35.0 - 47.0 %     (H) 78 - 100 fL    MCH 35.4 (H) 26.5 - 33.0 pg    MCHC 34.5 31.5 - 36.5 g/dL    RDW 12.3 10.0 - 15.0 %    Platelet Count 119 (L) 150 - 450 10e3/uL    % Neutrophils 79 %    % Lymphocytes 9 %    % Monocytes 12 %    % Eosinophils 0 %    % Basophils 1 %    % Immature Granulocytes 0 %    NRBCs per 100 WBC 0 <1 /100    Absolute Neutrophils 2.6 1.6 - 8.3 10e3/uL    Absolute Lymphocytes 0.3 (L) 0.8 - 5.3 10e3/uL    Absolute Monocytes 0.4 0.0 - 1.3 10e3/uL    Absolute Eosinophils 0.0 0.0 - 0.7 10e3/uL    Absolute Basophils 0.0 0.0 - 0.2 10e3/uL    Absolute Immature Granulocytes 0.0 <=0.4 10e3/uL    Absolute NRBCs 0.0 10e3/uL   EKG 12-lead, tracing only   Result Value Ref Range    Systolic Blood Pressure  mmHg    Diastolic Blood Pressure  mmHg    Ventricular Rate 85 BPM    Atrial Rate 85 BPM    DC Interval 142 ms    QRS Duration 78 ms     ms    QTc 437 ms    P Axis 58 degrees    R AXIS 25 degrees    T Axis 23 degrees    Interpretation ECG       Sinus rhythm with sinus arrhythmia  Normal ECG  When compared with ECG of 15-May-2025 19:20,  No significant change was found  Confirmed by SEE ED PROVIDER NOTE FOR, ECG INTERPRETATION (4000),  Richard Cintron ([id number]) on 7/31/2025 4:47:39 PM     CT Head w/o Contrast    Narrative    EXAM: CT HEAD W/O CONTRAST, CT CERVICAL SPINE W/O CONTRAST  LOCATION: [facility]  DATE: 7/31/2025    INDICATION: Fall, loss of consciousness, possible seizure.  COMPARISON: None.  TECHNIQUE:   1) Routine CT Head without IV contrast. Multiplanar reformats. Dose reduction techniques were used.  2) Routine CT Cervical Spine without IV contrast. Multiplanar reformats. Dose reduction techniques were used.    FINDINGS:   HEAD CT:   INTRACRANIAL CONTENTS: No intracranial hemorrhage, extraaxial collection, or mass effect.  No CT  evidence of acute infarct. Mild presumed chronic small vessel ischemic changes. Mild generalized volume loss. No hydrocephalus.     VISUALIZED ORBITS/SINUSES/MASTOIDS: No intraorbital abnormality. No paranasal sinus mucosal disease. No middle ear or mastoid effusion. Status post left canal wall down mastoidectomy.    BONES/SOFT TISSUES: High right posterior scalp soft tissue swelling/contusion and laceration. No calvarial fracture.    CERVICAL SPINE CT:   VERTEBRA:  No definite acute fracture or posttraumatic subluxation.     CANAL/FORAMINA: Multilevel degenerative changes. No high-grade canal stenosis. No high-grade neural foraminal stenosis.    PARASPINAL: No extraspinal abnormality. Visualized lung fields are clear.      Impression    IMPRESSION:  HEAD CT:  1.  No CT evidence for acute intracranial process.  2.  Brain atrophy and presumed chronic microvascular ischemic changes as above.    CERVICAL SPINE CT:  1. No CT evidence for acute fracture or posttraumatic subluxation.     CT Cervical Spine w/o Contrast    Narrative    EXAM: CT HEAD W/O CONTRAST, CT CERVICAL SPINE W/O CONTRAST  LOCATION: [facility]  DATE: 7/31/2025    INDICATION: Fall, loss of consciousness, possible seizure.  COMPARISON: None.  TECHNIQUE:   1) Routine CT Head without IV contrast. Multiplanar reformats. Dose reduction techniques were used.  2) Routine CT Cervical Spine without IV contrast. Multiplanar reformats. Dose reduction techniques were used.    FINDINGS:   HEAD CT:   INTRACRANIAL CONTENTS: No intracranial hemorrhage, extraaxial collection, or mass effect.  No CT evidence of acute infarct. Mild presumed chronic small vessel ischemic changes. Mild generalized volume loss. No hydrocephalus.     VISUALIZED ORBITS/SINUSES/MASTOIDS: No intraorbital abnormality. No paranasal sinus mucosal disease. No middle ear or mastoid effusion. Status post left canal wall down mastoidectomy.    BONES/SOFT TISSUES: High  right posterior scalp soft tissue swelling/contusion and laceration. No calvarial fracture.    CERVICAL SPINE CT:   VERTEBRA:  No definite acute fracture or posttraumatic subluxation.     CANAL/FORAMINA: Multilevel degenerative changes. No high-grade canal stenosis. No high-grade neural foraminal stenosis.    PARASPINAL: No extraspinal abnormality. Visualized lung fields are clear.      Impression    IMPRESSION:  HEAD CT:  1.  No CT evidence for acute intracranial process.  2.  Brain atrophy and presumed chronic microvascular ischemic changes as above.    CERVICAL SPINE CT:  1. No CT evidence for acute fracture or posttraumatic subluxation.     Elbow XR,  2 views, left    Narrative    EXAM: XR ELBOW LEFT 2 VIEWS, XR ELBOW RIGHT 2 VIEWS  LOCATION: [facility]  DATE: 7/31/2025    INDICATION: Bilateral elbow pain.  COMPARISON: None.      Impression    IMPRESSION: Normal joint spaces and alignment. No fracture or joint effusion.   Elbow XR, 2 views, right    Narrative    EXAM: XR ELBOW LEFT 2 VIEWS, XR ELBOW RIGHT 2 VIEWS  LOCATION: [facility]  DATE: 7/31/2025    INDICATION: Bilateral elbow pain.  COMPARISON: None.      Impression    IMPRESSION: Normal joint spaces and alignment. No fracture or joint effusion.   Troponin T, High Sensitivity   Result Value Ref Range    Troponin T, High Sensitivity 21 (H) <=14 ng/L   CT Chest Pulmonary Embolism w Contrast    Narrative    EXAM: CT CHEST PULMONARY EMBOLISM W CONTRAST  LOCATION: [facility]  DATE: 7/31/2025    INDICATION: syncope, elevated trop  COMPARISON: None.  TECHNIQUE: CT chest pulmonary angiogram during arterial phase injection of IV contrast. Multiplanar reformats and MIP reconstructions were performed. Dose reduction techniques were used.   CONTRAST: Isovue 370, 51ml    FINDINGS:  ANGIOGRAM CHEST: Pulmonary arteries are normal caliber and negative for pulmonary emboli.  Thoracic aorta is negative for dissection. No CT evidence of right heart strain.    LUNGS AND PLEURA: Minimal dependent atelectasis, lungs otherwise clear. No infiltrates or effusions.    MEDIASTINUM/AXILLAE: Normal. No adenopathy.    CORONARY ARTERY CALCIFICATION: None.    UPPER ABDOMEN: Fatty infiltration of the liver.    MUSCULOSKELETAL: Multiple old left rib fractures.      Impression    IMPRESSION:  1.  No evidence for pulmonary emboli.  2.  No evidence for acute pulmonary disease.       Medications   diazepam (VALIUM) injection 5 mg (0 mg Intravenous Hold 7/31/25 1711)   OLANZapine zydis (zyPREXA) ODT tab 5-10 mg (has no administration in time range)     Or   haloperidol lactate (HALDOL) injection 2.5-5 mg (has no administration in time range)   flumazenil (ROMAZICON) injection 0.2 mg (has no administration in time range)   melatonin tablet 5 mg (has no administration in time range)   cloNIDine (CATAPRES) tablet 0.1 mg (0.1 mg Oral $Given 7/31/25 2028)   diazepam (VALIUM) tablet 10 mg ( Oral See Alternative 7/31/25 1617)     Or   diazepam (VALIUM) injection 5-10 mg (10 mg Intravenous $Given 7/31/25 1617)   multivitamin w/minerals (THERA-VIT-M) tablet 1 tablet (1 tablet Oral $Given 7/31/25 2028)   thiamine (B-1) injection 100 mg (100 mg Intravenous $Given 7/31/25 1717)   folic acid injection 1 mg (1 mg Intravenous $Given 7/31/25 1625)   sodium chloride 0.9% BOLUS 1,000 mL (0 mLs Intravenous Stopped 7/31/25 1949)   magnesium sulfate 2 g in 50 mL sterile water intermittent infusion (0 g Intravenous Stopped 7/31/25 2016)   diazepam (VALIUM) injection 5 mg (5 mg Intravenous $Given 7/31/25 1749)   sodium chloride 0.9 % bag for CT scan flush (70 mLs Intravenous $Given 7/31/25 1903)   iopamidol (ISOVUE-370) solution 500 mL (51 mLs Intravenous $Given 7/31/25 1903)       Assessments & Plan (with Medical Decision Making)   She is hypertensive, in triage heart rate 94, more consistently 105, rest of vitals are reassuring.   Given the history of heavy drinking followed by sobriety in the last 24 hours, biting on the side of the tongue, unwitnessed loss of consciousness event, there is a concern of possible alcohol withdrawal seizure today, seizure pads were placed.  Patient was given empiric Valium and placed on the CIWA protocol.    I interpreted her twelve-lead EKG today that shows a sinus rhythm with a rate of 85, reassuring intervals without ischemic changes.  Her initial troponin is 23, after 2 hours is now 21, low suspicion for acute ACS.  In the setting of the consciousness episode we did pursue a PE study that did not show any pulmonary embolism or other acute pathology.  CT head and cervical spine did not show any traumatic or acute pathology.  X-ray of the bilateral elbows does not show any traumatic injuries.  Reviewed other nonemergent and incidental findings with her.  Her hemoglobin is reassuring today.  She does have minor elevation of AST and ALT which are likely related to alcohol use.  She remains during her time in the ER on the CIWA protocol and has been given Valium as needed, states after the Valium doses she does feel better.  She does not have any recurrent seizure activity or any other arrhythmia on cardiac monitor.  Discussed this case with the hospitalist service and they are agreeable with admitting her to the hospital for further care of alcohol withdrawal syndrome.     I have reviewed the nursing notes.    I have reviewed the findings, diagnosis, plan and need for follow up with the patient.          New Prescriptions    No medications on file       Final diagnoses:   Alcohol withdrawal seizure without complication (H)   Alcohol withdrawal, with unspecified complication (H)   Hypomagnesemia       7/31/2025   [facility] EMERGENCY DEPT       Alec Owens MD  07/31/25 2037

## 2025-08-01 LAB
ALBUMIN SERPL BCG-MCNC: 3.9 G/DL (ref 3.5–5.2)
ALP SERPL-CCNC: 89 U/L (ref 40–150)
ALT SERPL W P-5'-P-CCNC: 56 U/L (ref 0–50)
ANION GAP SERPL CALCULATED.3IONS-SCNC: 13 MMOL/L (ref 7–15)
AST SERPL W P-5'-P-CCNC: 71 U/L (ref 0–45)
BILIRUB SERPL-MCNC: 0.7 MG/DL
BUN SERPL-MCNC: 11.9 MG/DL (ref 6–20)
CALCIUM SERPL-MCNC: 8.7 MG/DL (ref 8.8–10.4)
CHLORIDE SERPL-SCNC: 100 MMOL/L (ref 98–107)
CREAT SERPL-MCNC: 0.76 MG/DL (ref 0.51–0.95)
EGFRCR SERPLBLD CKD-EPI 2021: >90 ML/MIN/1.73M2
ERYTHROCYTE [DISTWIDTH] IN BLOOD BY AUTOMATED COUNT: 12.3 % (ref 10–15)
ERYTHROCYTE [DISTWIDTH] IN BLOOD BY AUTOMATED COUNT: 12.4 % (ref 10–15)
GLUCOSE SERPL-MCNC: 100 MG/DL (ref 70–99)
HCO3 SERPL-SCNC: 24 MMOL/L (ref 22–29)
HCT VFR BLD AUTO: 27.6 % (ref 35–47)
HCT VFR BLD AUTO: 28.8 % (ref 35–47)
HGB BLD-MCNC: 9.3 G/DL (ref 11.7–15.7)
HGB BLD-MCNC: 9.6 G/DL (ref 11.7–15.7)
MAGNESIUM SERPL-MCNC: 1.9 MG/DL (ref 1.7–2.3)
MCH RBC QN AUTO: 34.9 PG (ref 26.5–33)
MCH RBC QN AUTO: 35.1 PG (ref 26.5–33)
MCHC RBC AUTO-ENTMCNC: 33.3 G/DL (ref 31.5–36.5)
MCHC RBC AUTO-ENTMCNC: 33.7 G/DL (ref 31.5–36.5)
MCV RBC AUTO: 104 FL (ref 78–100)
MCV RBC AUTO: 105 FL (ref 78–100)
PLATELET # BLD AUTO: 104 10E3/UL (ref 150–450)
PLATELET # BLD AUTO: 104 10E3/UL (ref 150–450)
POTASSIUM SERPL-SCNC: 3.5 MMOL/L (ref 3.4–5.3)
PROT SERPL-MCNC: 6.1 G/DL (ref 6.4–8.3)
RBC # BLD AUTO: 2.65 10E6/UL (ref 3.8–5.2)
RBC # BLD AUTO: 2.75 10E6/UL (ref 3.8–5.2)
SODIUM SERPL-SCNC: 137 MMOL/L (ref 135–145)
WBC # BLD AUTO: 3.6 10E3/UL (ref 4–11)
WBC # BLD AUTO: 4 10E3/UL (ref 4–11)

## 2025-08-01 PROCEDURE — 250N000013 HC RX MED GY IP 250 OP 250 PS 637: Performed by: INTERNAL MEDICINE

## 2025-08-01 PROCEDURE — 36415 COLL VENOUS BLD VENIPUNCTURE: CPT | Performed by: NURSE PRACTITIONER

## 2025-08-01 PROCEDURE — 120N000001 HC R&B MED SURG/OB

## 2025-08-01 PROCEDURE — 250N000013 HC RX MED GY IP 250 OP 250 PS 637: Performed by: NURSE PRACTITIONER

## 2025-08-01 PROCEDURE — 83735 ASSAY OF MAGNESIUM: CPT | Performed by: NURSE PRACTITIONER

## 2025-08-01 PROCEDURE — 36415 COLL VENOUS BLD VENIPUNCTURE: CPT | Performed by: INTERNAL MEDICINE

## 2025-08-01 PROCEDURE — 258N000003 HC RX IP 258 OP 636: Performed by: NURSE PRACTITIONER

## 2025-08-01 PROCEDURE — 80053 COMPREHEN METABOLIC PANEL: CPT | Performed by: INTERNAL MEDICINE

## 2025-08-01 PROCEDURE — 85014 HEMATOCRIT: CPT | Performed by: NURSE PRACTITIONER

## 2025-08-01 PROCEDURE — 99232 SBSQ HOSP IP/OBS MODERATE 35: CPT | Performed by: NURSE PRACTITIONER

## 2025-08-01 PROCEDURE — 85014 HEMATOCRIT: CPT | Performed by: INTERNAL MEDICINE

## 2025-08-01 RX ORDER — FOLIC ACID 1 MG/1
1 TABLET ORAL DAILY
Status: DISCONTINUED | OUTPATIENT
Start: 2025-08-01 | End: 2025-08-03 | Stop reason: HOSPADM

## 2025-08-01 RX ORDER — SODIUM CHLORIDE, SODIUM LACTATE, POTASSIUM CHLORIDE, CALCIUM CHLORIDE 600; 310; 30; 20 MG/100ML; MG/100ML; MG/100ML; MG/100ML
INJECTION, SOLUTION INTRAVENOUS CONTINUOUS
Status: DISCONTINUED | OUTPATIENT
Start: 2025-08-01 | End: 2025-08-02

## 2025-08-01 RX ADMIN — BUPROPION HYDROCHLORIDE 150 MG: 150 TABLET, FILM COATED, EXTENDED RELEASE ORAL at 08:25

## 2025-08-01 RX ADMIN — SODIUM CHLORIDE, SODIUM LACTATE, POTASSIUM CHLORIDE, AND CALCIUM CHLORIDE: .6; .31; .03; .02 INJECTION, SOLUTION INTRAVENOUS at 14:58

## 2025-08-01 RX ADMIN — THIAMINE HCL TAB 100 MG 100 MG: 100 TAB at 08:37

## 2025-08-01 RX ADMIN — Medication 1 TABLET: at 08:24

## 2025-08-01 RX ADMIN — PANTOPRAZOLE SODIUM 40 MG: 40 TABLET, DELAYED RELEASE ORAL at 13:29

## 2025-08-01 RX ADMIN — DIAZEPAM 10 MG: 5 TABLET ORAL at 11:42

## 2025-08-01 RX ADMIN — GABAPENTIN 900 MG: 300 CAPSULE ORAL at 22:42

## 2025-08-01 RX ADMIN — BUPROPION HYDROCHLORIDE 150 MG: 150 TABLET, FILM COATED, EXTENDED RELEASE ORAL at 20:00

## 2025-08-01 RX ADMIN — FOLIC ACID 1 MG: 1 TABLET ORAL at 08:37

## 2025-08-01 RX ADMIN — GABAPENTIN 900 MG: 300 CAPSULE ORAL at 05:30

## 2025-08-01 RX ADMIN — LOSARTAN POTASSIUM 100 MG: 50 TABLET, FILM COATED ORAL at 08:24

## 2025-08-01 RX ADMIN — GABAPENTIN 900 MG: 300 CAPSULE ORAL at 13:30

## 2025-08-01 RX ADMIN — HYDROCHLOROTHIAZIDE 25 MG: 25 TABLET ORAL at 08:24

## 2025-08-01 ASSESSMENT — ACTIVITIES OF DAILY LIVING (ADL)
ADLS_ACUITY_SCORE: 20
ADLS_ACUITY_SCORE: 20
ADLS_ACUITY_SCORE: 27
ADLS_ACUITY_SCORE: 20
ADLS_ACUITY_SCORE: 20
ADLS_ACUITY_SCORE: 27
ADLS_ACUITY_SCORE: 20
ADLS_ACUITY_SCORE: 27
ADLS_ACUITY_SCORE: 22
ADLS_ACUITY_SCORE: 20
ADLS_ACUITY_SCORE: 20
ADLS_ACUITY_SCORE: 27
ADLS_ACUITY_SCORE: 20
ADLS_ACUITY_SCORE: 20
ADLS_ACUITY_SCORE: 27
ADLS_ACUITY_SCORE: 27
ADLS_ACUITY_SCORE: 20
ADLS_ACUITY_SCORE: 16
ADLS_ACUITY_SCORE: 27
ADLS_ACUITY_SCORE: 20
ADLS_ACUITY_SCORE: 16

## 2025-08-01 NOTE — ED NOTES
ED Nursing criteria listed below was addressed during verbal handoff:     Abnormal vitals: Yes  Abnormal results: Yes  Med Reconciliation completed: Yes  Meds given in ED: Yes  Any Overdue Meds: No  Core Measures: N/A  Isolation: N/A  Special needs: Yes CIWA   Skin assessment: Yes    Observation Patient  Education provided: N/A

## 2025-08-01 NOTE — PROGRESS NOTES
"S-(situation): Patient arrives to room 250 via cart from ED    B-(background): ***    A-(assessment): ***    R-(recommendations): Orders reviewed with ***. Will monitor patient per MD orders.     Inpatient nursing criteria listed below were met:    Health care directives status obtained and documented: {YES / NO:886323::\"Yes\"}  VTE ordered/documented: {YES / NO:904341::\"Yes\"}  Skin issues/needs documented:{YES/NO/NA IS DEFAULT:168006}  Isolation addressed and Signage used: {YES/NO/NA IS DEFAULT:775689}  Fall Prevention: Care plan updated {YES/NO/NA IS DEFAULT:986000} Education given and documented {YES/NO/NA IS DEFAULT:142476}  Care Plan initiated and Co-Morbidities added: {YES / NO:963235::\"Yes\"}  Education Assessment documented:{YES / NO:105287::\"Yes\"}  Admission Education Documented: {YES / NO:170297::\"Yes\"}  If present CAUTI/CLABI Education done: {YES/NO/NA IS DEFAULT:241601}  New medication patient education completed and documented (Possible Side Effects of Common Medications handout): {YES / NO:691431::\"Yes\"}  Allergies Reviewed: {YES / NO:105287::\"Yes\"}  Admission Medication Reconciliation completed: {YES / NO:464345::\"Yes\"}  Home medications if not able to send immediately home with family stored here: {YES/NO/NA IS DEFAULT:219509}  Reminder note placed in discharge instructions regarding home meds: {YES/NO/NA IS DEFAULT:772683}  Individualized care needs/preferences addressed and charted: {YES / NO:872554::\"Yes\"}  Provider Notified that patient has arrived to the unit: {YES / NO:648801::\"Yes\"}       "

## 2025-08-01 NOTE — H&P
[facility]    History and Physical - Hospitalist Service       Date of Admission:  7/31/2025    Assessment & Plan      Maranda Figueroa is a 55 year old female admitted on 7/31/2025. She presented to the ED after she was found unresponsive and likely had a seizure secondary to alcohol withdrawals.     Seizure secondary to alcohol withdrawals  EtOH abuse  She has a history of EtOH abuse and presented to the ED after she was found unresponsive by her . She was reportedly very confused after she woke up and she has some injuries to her tongue. She admits to drinking heavily over the last 6 months and she last had an alcoholic drink about 24 hours prior to arrival. She appears to be withdrawing with a CIWA score of 10 on arrival.   - admit to hospitalist service  - [facility] protocol initiated  - seizure precautions  - c/w thiamine and folic acid  - c/w clonidine and gabapentin    Depression  Anxiety  - c/w bupropion    Elevated LFTs  Most likely secondary to EtOH abuse.   - trend LFTs    Hypertension  - c/w losartan  - c/w hydrochlorothiazide    Thrombocytopenia  Probably secondary to EtOH abuse.   - trend CBC  - encourage EtOH cessation         Diet: Combination Diet Low Saturated Fat Na <2400mg Diet, No Caffeine Diet  DVT Prophylaxis: Pneumatic Compression Devices  Butler Catheter: Not present  Lines: None     Code Status: Full Code    Clinically Significant Risk Factors Present on Admission             # Hypomagnesemia: Lowest Mg = 1.4 mg/dL in last 2 days, will replace as needed     # Thrombocytopenia: Lowest platelets = 119 in last 2 days, will monitor for bleeding   # Hypertension: Noted on problem list      # Anemia: based on hgb <11                  Disposition Plan      Expected Discharge Date: 08/04/2025                The patient's care was discussed with the Bedside Nurse and Patient.        Alex Villalobos MD  [facility]  Securely message with  the [employer] Web Console (learn more here)  Text page via [facility] Paging/Directory      Visit/Communication Style   Virtual (Video) communication was used to evaluate Maranda.  Maranda consented to the use of video communication: yes  Video START time: 2340, 7/31/2025  Video STOP time: 2350, 7/31/2025   Patient's location: [facility]   Provider's location during the visit: [facility] [place] Tele-medicine site        ______________________________________________________________________    Chief Complaint   Loss of consciousness    History is obtained from the patient    History of Present Illness   Maranda Figueroa is a 55 year old female who presented to the ED after she was found unconscious by her . She was then noted to be confused for about 30 minutes. She admits that she has been drinking heavily for several months and last had a drink about 2000 yesterday. She drinks about 5-6 drinks (vodka and water) most nights. She started to have some nausea. She also had some diaphoresis, headaches, ataxia and shakes. She went to work, despite this, but started to have vomiting. She went home from work and called her  to come back home to stay with her. After that her daughter brought her kids over to her house. The last thing she remembers was her grandchildren asking to use the pool. The next thing she remembers is that she was sitting on the couch. She had pain in her arms, elbows and shoulders. She states that her  found her lying on the ground and brought her inside.      Review of Systems    General: negative for fever, chills, sweats, weakness  Eyes: negative for blurred vision, loss of vision  Ear Nose and Throat: negative for pharyngitis, speech or swallowing difficulties  Respiratory:  negative for sputum production, wheezing, MUNSON, pleuritic pain, sob or cough  Cardiology:  negative for chest pain, palpitations, orthopnea, PND, edema, syncope   Gastrointestinal:  negative for abdominal pain, diarrhea, constipation, hematemesis, melena or hematochezia  Genitourinary: negative for frequency, urgency, dysuria, hematuria   Neurological: negative for focal weakness, paresthesia    Past Medical History    I have reviewed this patient's medical history and updated it with pertinent information if needed.   Past Medical History:   Diagnosis Date    Anxiety state, unspecified     Depressive disorder, not elsewhere classified     Unspecified conductive hearing loss     deaf in left ear    Vitamin D deficiency 2/10/2016       Past Surgical History   I have reviewed this patient's surgical history and updated it with pertinent information if needed.  Past Surgical History:   Procedure Laterality Date    ESOPHAGOSCOPY, GASTROSCOPY, DUODENOSCOPY (EGD), COMBINED N/A 10/5/2015    Procedure: COMBINED ESOPHAGOSCOPY, GASTROSCOPY, DUODENOSCOPY (EGD), BIOPSY SINGLE OR MULTIPLE;  Surgeon: Calin Kenyon MD;  Location: [facility]    OPEN REDUCTION INTERNAL FIXATION RODDING INTRAMEDULLARY TIBIA  2011    Procedure:OPEN REDUCTION INTERNAL FIXATION RODDING INTRAMEDULLARY TIBIA; Open Reduction internal fixation rodding right tibia; Surgeon:JUNO TUTTLE; Location:[facility]    TYMPANOPLASTY, RT/LT      Tympanoplasty/LT       Social History   I have reviewed this patient's social history and updated it with pertinent information if needed.  Social History     Tobacco Use    Smoking status: Former     Current packs/day: 0.00     Types: Cigarettes     Quit date: 1994     Years since quittin.1    Smokeless tobacco: Never   Vaping Use    Vaping status: Never Used   Substance Use Topics    Alcohol use: Yes     Alcohol/week: 6.0 standard drinks of alcohol     Types: 6 Standard drinks or equivalent per week     Comment: daily    Drug use: No       Family History   I have reviewed this patient's family history and updated it with pertinent information if needed.  Family History   Problem Relation  Age of Onset    Diabetes Mother     Hypertension Mother     Thyroid Disease Mother     Cancer - colorectal Mother     Diabetes Father     Hypertension Father     Heart Disease Maternal Grandmother     Diabetes Maternal Grandfather        Prior to Admission Medications   Prior to Admission Medications   Prescriptions Last Dose Informant Patient Reported? Taking?   buPROPion (WELLBUTRIN SR) 150 MG 12 hr tablet 7/30/2025 Morning  No Yes   Sig: TAKE ONE TABLET BY MOUTH TWICE A DAY   hydrochlorothiazide (HYDRODIURIL) 25 MG tablet 7/31/2025 Morning  No Yes   Sig: TAKE ONE TABLET BY MOUTH ONCE DAILY   losartan (COZAAR) 100 MG tablet 7/31/2025 Morning  Yes Yes   Sig: Take 100 mg by mouth daily.   omeprazole (PRILOSEC) 20 MG DR capsule 7/31/2025 at  1:00 PM  Yes Yes   Sig: Take 20 mg by mouth daily at 2 pm.   thiamine (B-1) 100 MG tablet 7/31/2025 Morning  No Yes   Sig: TAKE ONE TABLET BY MOUTH ONCE DAILY      Facility-Administered Medications: None     Allergies   Allergies   Allergen Reactions    Penicillins Nausea and Vomiting       Physical Exam   Vital Signs: Temp: 98.1  F (36.7  C) Temp src: Oral BP: 131/89 Pulse: 83   Resp: 16 SpO2: 98 % O2 Device: None (Room air)    Weight: 122 lbs 0 oz    Gen:  Well-developed, well-nourished, in no acute distress, lying semi-supine in hospital stretcher  HEENT:  Anicteric sclera, PER, hearing intact to voice  Resp:  No accessory muscle use, breath sounds clear; no wheezes no rales no rhonchi  Card:  Regular rhythm and rate, no murmur, normal S1, S2, no JVD, no LE edema  Abd:  Soft per RN exam, no TTP, non-distended, normoactive bowel sounds are present  Musc:  Normal strength and movement of the major muscle groups without obvious deformity  Skin: Intact, no rashes noted  Psych:  Good insight, oriented to person, place and time, not anxious, not agitated  Neuro: CN 2-12 intact, no focal deficits or sensory deficits. No tremors noted.   Data     Recent Labs   Lab 07/31/25  1609   WBC  3.4*   HGB 10.5*   *   *      POTASSIUM 3.9   CHLORIDE 101   CO2 26   BUN 19.1   CR 0.87   ANIONGAP 14   SILVA 9.8   *   ALBUMIN 4.6   PROTTOTAL 7.0   BILITOTAL 0.8   ALKPHOS 103   ALT 69*   *   LIPASE 48         Recent Results (from the past 24 hours)   CT Head w/o Contrast    Narrative    EXAM: CT HEAD W/O CONTRAST, CT CERVICAL SPINE W/O CONTRAST  LOCATION: [facility]  DATE: 7/31/2025    INDICATION: Fall, loss of consciousness, possible seizure.  COMPARISON: None.  TECHNIQUE:   1) Routine CT Head without IV contrast. Multiplanar reformats. Dose reduction techniques were used.  2) Routine CT Cervical Spine without IV contrast. Multiplanar reformats. Dose reduction techniques were used.    FINDINGS:   HEAD CT:   INTRACRANIAL CONTENTS: No intracranial hemorrhage, extraaxial collection, or mass effect.  No CT evidence of acute infarct. Mild presumed chronic small vessel ischemic changes. Mild generalized volume loss. No hydrocephalus.     VISUALIZED ORBITS/SINUSES/MASTOIDS: No intraorbital abnormality. No paranasal sinus mucosal disease. No middle ear or mastoid effusion. Status post left canal wall down mastoidectomy.    BONES/SOFT TISSUES: High right posterior scalp soft tissue swelling/contusion and laceration. No calvarial fracture.    CERVICAL SPINE CT:   VERTEBRA:  No definite acute fracture or posttraumatic subluxation.     CANAL/FORAMINA: Multilevel degenerative changes. No high-grade canal stenosis. No high-grade neural foraminal stenosis.    PARASPINAL: No extraspinal abnormality. Visualized lung fields are clear.      Impression    IMPRESSION:  HEAD CT:  1.  No CT evidence for acute intracranial process.  2.  Brain atrophy and presumed chronic microvascular ischemic changes as above.    CERVICAL SPINE CT:  1. No CT evidence for acute fracture or posttraumatic subluxation.     CT Cervical Spine w/o Contrast    Narrative    EXAM: CT HEAD W/O  CONTRAST, CT CERVICAL SPINE W/O CONTRAST  LOCATION: [facility]  DATE: 7/31/2025    INDICATION: Fall, loss of consciousness, possible seizure.  COMPARISON: None.  TECHNIQUE:   1) Routine CT Head without IV contrast. Multiplanar reformats. Dose reduction techniques were used.  2) Routine CT Cervical Spine without IV contrast. Multiplanar reformats. Dose reduction techniques were used.    FINDINGS:   HEAD CT:   INTRACRANIAL CONTENTS: No intracranial hemorrhage, extraaxial collection, or mass effect.  No CT evidence of acute infarct. Mild presumed chronic small vessel ischemic changes. Mild generalized volume loss. No hydrocephalus.     VISUALIZED ORBITS/SINUSES/MASTOIDS: No intraorbital abnormality. No paranasal sinus mucosal disease. No middle ear or mastoid effusion. Status post left canal wall down mastoidectomy.    BONES/SOFT TISSUES: High right posterior scalp soft tissue swelling/contusion and laceration. No calvarial fracture.    CERVICAL SPINE CT:   VERTEBRA:  No definite acute fracture or posttraumatic subluxation.     CANAL/FORAMINA: Multilevel degenerative changes. No high-grade canal stenosis. No high-grade neural foraminal stenosis.    PARASPINAL: No extraspinal abnormality. Visualized lung fields are clear.      Impression    IMPRESSION:  HEAD CT:  1.  No CT evidence for acute intracranial process.  2.  Brain atrophy and presumed chronic microvascular ischemic changes as above.    CERVICAL SPINE CT:  1. No CT evidence for acute fracture or posttraumatic subluxation.     Elbow XR,  2 views, left    Narrative    EXAM: XR ELBOW LEFT 2 VIEWS, XR ELBOW RIGHT 2 VIEWS  LOCATION: [facility]  DATE: 7/31/2025    INDICATION: Bilateral elbow pain.  COMPARISON: None.      Impression    IMPRESSION: Normal joint spaces and alignment. No fracture or joint effusion.   Elbow XR, 2 views, right    Narrative    EXAM: XR ELBOW LEFT 2 VIEWS, XR ELBOW RIGHT 2  VIEWS  LOCATION: [facility]  DATE: 7/31/2025    INDICATION: Bilateral elbow pain.  COMPARISON: None.      Impression    IMPRESSION: Normal joint spaces and alignment. No fracture or joint effusion.   CT Chest Pulmonary Embolism w Contrast    Narrative    EXAM: CT CHEST PULMONARY EMBOLISM W CONTRAST  LOCATION: [facility]  DATE: 7/31/2025    INDICATION: syncope, elevated trop  COMPARISON: None.  TECHNIQUE: CT chest pulmonary angiogram during arterial phase injection of IV contrast. Multiplanar reformats and MIP reconstructions were performed. Dose reduction techniques were used.   CONTRAST: Isovue 370, 51ml    FINDINGS:  ANGIOGRAM CHEST: Pulmonary arteries are normal caliber and negative for pulmonary emboli. Thoracic aorta is negative for dissection. No CT evidence of right heart strain.    LUNGS AND PLEURA: Minimal dependent atelectasis, lungs otherwise clear. No infiltrates or effusions.    MEDIASTINUM/AXILLAE: Normal. No adenopathy.    CORONARY ARTERY CALCIFICATION: None.    UPPER ABDOMEN: Fatty infiltration of the liver.    MUSCULOSKELETAL: Multiple old left rib fractures.      Impression    IMPRESSION:  1.  No evidence for pulmonary emboli.  2.  No evidence for acute pulmonary disease.

## 2025-08-01 NOTE — PROGRESS NOTES
[facility]    Medicine Progress Note - Hospitalist Service    Date of Admission:  7/31/2025    Assessment & Plan   Maranda Figueroa is a 55 year old female who presented to the ED after she was found unconscious by her . She was then noted to be confused for about 30 minutes. She admits that she had been drinking heavily for several months and last had a drink about 2000 on 7/30. She drinks about 5-6 drinks (vodka and water) most nights. She started to have some nausea. She also had some diaphoresis, headaches, ataxia and shakes. She went to work, despite this, but started to have vomiting. She went home from work and called her  to come back home to stay with her. After that her daughter brought her kids over to her house. The last thing she remembers was her grandchildren asking to use the pool. The next thing she remembers is that she was sitting on the couch. She had pain in her arms, elbows and shoulders. She states that her  found her lying on the ground and brought her inside. Since the seizure versus fall was unwitnessed she had extensive imaging in the ED with did not reveal any acute pathology.  CTA was also done which was negative for PE.        Acute alcohol withdrawal  Seizure secondary to alcohol withdrawals  EtOH abuse  She has a history of EtOH abuse and presented to the ED after she was found unresponsive by her . She was reportedly very confused after she woke up and she has some injuries to her tongue. She admits to drinking heavily over the last 6 months and she last had an alcoholic drink about 24 hours prior to arrival. She was scoring 10 on CIWA arrival.   She also stated that she was having hallucinations (seeing colors).  Scoring 4 to 6 overnight on CIWA and continues with significant bilateral hand tremor and exhibits anxiety and with high risk due to hallucinations and likely withdrawal seizure will keep her on aggressive ciwa  protocol.    - continue CIWA protocol initiated  - seizure precautions  - c/w thiamine and folic acid  - c/w  gabapentin     Depression  Anxiety  - c/w bupropion     Elevated LFTs  Most likely secondary to EtOH abuse.   - trend LFTs     Hypertension  - c/w losartan  - c/w hydrochlorothiazide     pancytopenia  Probably secondary to EtOH abuse.   - trend CBC  - encourage EtOH cessation          Diet: Combination Diet Low Saturated Fat Na <2400mg Diet, No Caffeine Diet    DVT Prophylaxis: Pneumatic Compression Devices  Butler Catheter: Not present  Lines: None     Cardiac Monitoring: ACTIVE order. Indication: Chest pain/ ACS rule out (24 hours)  Code Status: Full Code      Clinically Significant Risk Factors Present on Admission             # Hypomagnesemia: Lowest Mg = 1.4 mg/dL in last 2 days, will replace as needed     # Thrombocytopenia: Lowest platelets = 104 in last 2 days, will monitor for bleeding   # Hypertension: Noted on problem list      # Anemia: based on hgb <11                  Social Drivers of Health    Tobacco Use: Medium Risk (7/31/2025)    Patient History     Smoking Tobacco Use: Former     Smokeless Tobacco Use: Never   Transportation Needs: High Risk (7/31/2025)    Transportation Needs     Within the past 12 months, has lack of transportation kept you from medical appointments, getting your medicines, non-medical meetings or appointments, work, or from getting things that you need?: Yes          Disposition Plan     Medically Ready for Discharge: Anticipated in 2-4 Days           The patient's care was discussed with the entire care team.    Michelle Avila CNP  Hospitalist Service  [facility]  Securely message with [employer] (more info)  Text page via [employer] Paging/Directory   ______________________________________________________________________    Interval History   No acute events overnight-scoring 4 to 6 on ciwa    Physical Exam   Vital Signs: Temp: 98.5  F (36.9  C)  Temp src: Oral BP: 107/59 Pulse: 74   Resp: 18 SpO2: 96 % O2 Device: None (Room air)    Weight: 122 lbs 0 oz    Gen:  Lying in bed no acute distress  HEENT:  normocephalic, atraumatic, oropharynx clear  Resp:  CTA  Card:  S1,S2, RRR no murmur, rub or gallop  Abd:  Soft nondistended, non tender,  normoactive bowel sounds   Neuro: significant bilateral hand tremor, no focal deficits      Medical Decision Making       40 MINUTES SPENT BY ME on the date of service doing chart review, history, exam, documentation & further activities per the note.        Data     I have personally reviewed the following data over the past 24 hrs:    3.6 (L)  \   9.3 (L)   / 104 (L)     137 100 11.9 /  100 (H)   3.5 24 0.76 \     ALT: 56 (H) AST: 71 (H) AP: 89 TBILI: 0.7   ALB: 3.9 TOT PROTEIN: 6.1 (L) LIPASE: 48     Trop: 21 (H) BNP: N/A     Procal: N/A CRP: N/A Lactic Acid: 1.2         Imaging results reviewed over the past 24 hrs:   Recent Results (from the past 24 hours)   CT Head w/o Contrast    Narrative    EXAM: CT HEAD W/O CONTRAST, CT CERVICAL SPINE W/O CONTRAST  LOCATION: [facility]  DATE: 7/31/2025    INDICATION: Fall, loss of consciousness, possible seizure.  COMPARISON: None.  TECHNIQUE:   1) Routine CT Head without IV contrast. Multiplanar reformats. Dose reduction techniques were used.  2) Routine CT Cervical Spine without IV contrast. Multiplanar reformats. Dose reduction techniques were used.    FINDINGS:   HEAD CT:   INTRACRANIAL CONTENTS: No intracranial hemorrhage, extraaxial collection, or mass effect.  No CT evidence of acute infarct. Mild presumed chronic small vessel ischemic changes. Mild generalized volume loss. No hydrocephalus.     VISUALIZED ORBITS/SINUSES/MASTOIDS: No intraorbital abnormality. No paranasal sinus mucosal disease. No middle ear or mastoid effusion. Status post left canal wall down mastoidectomy.    BONES/SOFT TISSUES: High right posterior scalp soft tissue  swelling/contusion and laceration. No calvarial fracture.    CERVICAL SPINE CT:   VERTEBRA:  No definite acute fracture or posttraumatic subluxation.     CANAL/FORAMINA: Multilevel degenerative changes. No high-grade canal stenosis. No high-grade neural foraminal stenosis.    PARASPINAL: No extraspinal abnormality. Visualized lung fields are clear.      Impression    IMPRESSION:  HEAD CT:  1.  No CT evidence for acute intracranial process.  2.  Brain atrophy and presumed chronic microvascular ischemic changes as above.    CERVICAL SPINE CT:  1. No CT evidence for acute fracture or posttraumatic subluxation.     CT Cervical Spine w/o Contrast    Narrative    EXAM: CT HEAD W/O CONTRAST, CT CERVICAL SPINE W/O CONTRAST  LOCATION: [facility]  DATE: 7/31/2025    INDICATION: Fall, loss of consciousness, possible seizure.  COMPARISON: None.  TECHNIQUE:   1) Routine CT Head without IV contrast. Multiplanar reformats. Dose reduction techniques were used.  2) Routine CT Cervical Spine without IV contrast. Multiplanar reformats. Dose reduction techniques were used.    FINDINGS:   HEAD CT:   INTRACRANIAL CONTENTS: No intracranial hemorrhage, extraaxial collection, or mass effect.  No CT evidence of acute infarct. Mild presumed chronic small vessel ischemic changes. Mild generalized volume loss. No hydrocephalus.     VISUALIZED ORBITS/SINUSES/MASTOIDS: No intraorbital abnormality. No paranasal sinus mucosal disease. No middle ear or mastoid effusion. Status post left canal wall down mastoidectomy.    BONES/SOFT TISSUES: High right posterior scalp soft tissue swelling/contusion and laceration. No calvarial fracture.    CERVICAL SPINE CT:   VERTEBRA:  No definite acute fracture or posttraumatic subluxation.     CANAL/FORAMINA: Multilevel degenerative changes. No high-grade canal stenosis. No high-grade neural foraminal stenosis.    PARASPINAL: No extraspinal abnormality. Visualized lung fields are  clear.      Impression    IMPRESSION:  HEAD CT:  1.  No CT evidence for acute intracranial process.  2.  Brain atrophy and presumed chronic microvascular ischemic changes as above.    CERVICAL SPINE CT:  1. No CT evidence for acute fracture or posttraumatic subluxation.     Elbow XR,  2 views, left    Narrative    EXAM: XR ELBOW LEFT 2 VIEWS, XR ELBOW RIGHT 2 VIEWS  LOCATION: [facility]  DATE: 7/31/2025    INDICATION: Bilateral elbow pain.  COMPARISON: None.      Impression    IMPRESSION: Normal joint spaces and alignment. No fracture or joint effusion.   Elbow XR, 2 views, right    Narrative    EXAM: XR ELBOW LEFT 2 VIEWS, XR ELBOW RIGHT 2 VIEWS  LOCATION: [facility]  DATE: 7/31/2025    INDICATION: Bilateral elbow pain.  COMPARISON: None.      Impression    IMPRESSION: Normal joint spaces and alignment. No fracture or joint effusion.   CT Chest Pulmonary Embolism w Contrast    Narrative    EXAM: CT CHEST PULMONARY EMBOLISM W CONTRAST  LOCATION: [facility]  DATE: 7/31/2025    INDICATION: syncope, elevated trop  COMPARISON: None.  TECHNIQUE: CT chest pulmonary angiogram during arterial phase injection of IV contrast. Multiplanar reformats and MIP reconstructions were performed. Dose reduction techniques were used.   CONTRAST: Isovue 370, 51ml    FINDINGS:  ANGIOGRAM CHEST: Pulmonary arteries are normal caliber and negative for pulmonary emboli. Thoracic aorta is negative for dissection. No CT evidence of right heart strain.    LUNGS AND PLEURA: Minimal dependent atelectasis, lungs otherwise clear. No infiltrates or effusions.    MEDIASTINUM/AXILLAE: Normal. No adenopathy.    CORONARY ARTERY CALCIFICATION: None.    UPPER ABDOMEN: Fatty infiltration of the liver.    MUSCULOSKELETAL: Multiple old left rib fractures.      Impression    IMPRESSION:  1.  No evidence for pulmonary emboli.  2.  No evidence for acute pulmonary  disease.

## 2025-08-01 NOTE — PLAN OF CARE
Goal Outcome Evaluation:      Plan of Care Reviewed With: patient    Overall Patient Progress: improvingOverall Patient Progress: improving       Patient a/o X4. Lethargic but calm and cooperative. VSS on RA. SR on tele. SBA with gait belt. CIWA scored 6 and 4. Abrasion to back of scalp with scant bleeding, abrasions to bilateral elbows. C/O 4/10 head pain upon arrival, scheduled gabapentin given and patient resting well in room.     /89 (BP Location: Right arm, Patient Position: Semi-Maza's)   Pulse 83   Temp 98.1  F (36.7  C) (Oral)   Resp 16   Wt 55.3 kg (122 lb)   LMP  (LMP Unknown)   SpO2 98%   BMI 22.31 kg/m

## 2025-08-01 NOTE — PLAN OF CARE
Goal Outcome Evaluation:      Plan of Care Reviewed With: patient    Overall Patient Progress: no changeOverall Patient Progress: no change    Outcome Evaluation: Patient CIWA scored 6, then 10, PRN Valium  10mg PO given, repeat CIWA after 2 hrs is 6, then latest CIWA score was 5.Patient SBA to bathroom. Patient had shower. A and O. VSS, afebrile, on room air. Complained of mild headache.

## 2025-08-02 LAB
ALBUMIN SERPL BCG-MCNC: 3.9 G/DL (ref 3.5–5.2)
ALP SERPL-CCNC: 83 U/L (ref 40–150)
ALT SERPL W P-5'-P-CCNC: 50 U/L (ref 0–50)
ANION GAP SERPL CALCULATED.3IONS-SCNC: 10 MMOL/L (ref 7–15)
AST SERPL W P-5'-P-CCNC: 54 U/L (ref 0–45)
BILIRUB SERPL-MCNC: 0.5 MG/DL
BUN SERPL-MCNC: 9.1 MG/DL (ref 6–20)
CALCIUM SERPL-MCNC: 9.2 MG/DL (ref 8.8–10.4)
CHLORIDE SERPL-SCNC: 103 MMOL/L (ref 98–107)
CREAT SERPL-MCNC: 0.75 MG/DL (ref 0.51–0.95)
EGFRCR SERPLBLD CKD-EPI 2021: >90 ML/MIN/1.73M2
GLUCOSE SERPL-MCNC: 116 MG/DL (ref 70–99)
HCO3 SERPL-SCNC: 26 MMOL/L (ref 22–29)
HOLD SPECIMEN: NORMAL
INR PPP: 1.01 (ref 0.85–1.15)
MAGNESIUM SERPL-MCNC: 1.5 MG/DL (ref 1.7–2.3)
MAGNESIUM SERPL-MCNC: 2.7 MG/DL (ref 1.7–2.3)
POTASSIUM SERPL-SCNC: 3.8 MMOL/L (ref 3.4–5.3)
PROT SERPL-MCNC: 5.9 G/DL (ref 6.4–8.3)
PROTHROMBIN TIME: 13.4 SECONDS (ref 11.8–14.8)
SODIUM SERPL-SCNC: 139 MMOL/L (ref 135–145)

## 2025-08-02 PROCEDURE — 250N000013 HC RX MED GY IP 250 OP 250 PS 637: Performed by: INTERNAL MEDICINE

## 2025-08-02 PROCEDURE — 258N000003 HC RX IP 258 OP 636: Performed by: NURSE PRACTITIONER

## 2025-08-02 PROCEDURE — 99232 SBSQ HOSP IP/OBS MODERATE 35: CPT | Performed by: NURSE PRACTITIONER

## 2025-08-02 PROCEDURE — 85610 PROTHROMBIN TIME: CPT | Performed by: NURSE PRACTITIONER

## 2025-08-02 PROCEDURE — 36415 COLL VENOUS BLD VENIPUNCTURE: CPT | Performed by: NURSE PRACTITIONER

## 2025-08-02 PROCEDURE — 80053 COMPREHEN METABOLIC PANEL: CPT | Performed by: NURSE PRACTITIONER

## 2025-08-02 PROCEDURE — 250N000013 HC RX MED GY IP 250 OP 250 PS 637: Performed by: NURSE PRACTITIONER

## 2025-08-02 PROCEDURE — 120N000001 HC R&B MED SURG/OB

## 2025-08-02 PROCEDURE — 250N000011 HC RX IP 250 OP 636: Performed by: NURSE PRACTITIONER

## 2025-08-02 PROCEDURE — 83735 ASSAY OF MAGNESIUM: CPT | Performed by: NURSE PRACTITIONER

## 2025-08-02 RX ORDER — MAGNESIUM SULFATE HEPTAHYDRATE 40 MG/ML
4 INJECTION, SOLUTION INTRAVENOUS ONCE
Status: COMPLETED | OUTPATIENT
Start: 2025-08-02 | End: 2025-08-02

## 2025-08-02 RX ADMIN — MAGNESIUM SULFATE HEPTAHYDRATE 4 G: 40 INJECTION, SOLUTION INTRAVENOUS at 08:21

## 2025-08-02 RX ADMIN — HYDROCHLOROTHIAZIDE 25 MG: 25 TABLET ORAL at 09:33

## 2025-08-02 RX ADMIN — SODIUM CHLORIDE, SODIUM LACTATE, POTASSIUM CHLORIDE, AND CALCIUM CHLORIDE: .6; .31; .03; .02 INJECTION, SOLUTION INTRAVENOUS at 00:48

## 2025-08-02 RX ADMIN — LOSARTAN POTASSIUM 100 MG: 50 TABLET, FILM COATED ORAL at 09:33

## 2025-08-02 RX ADMIN — GABAPENTIN 900 MG: 300 CAPSULE ORAL at 21:43

## 2025-08-02 RX ADMIN — PANTOPRAZOLE SODIUM 40 MG: 40 TABLET, DELAYED RELEASE ORAL at 13:16

## 2025-08-02 RX ADMIN — THIAMINE HCL TAB 100 MG 100 MG: 100 TAB at 09:33

## 2025-08-02 RX ADMIN — BUPROPION HYDROCHLORIDE 150 MG: 150 TABLET, FILM COATED, EXTENDED RELEASE ORAL at 20:04

## 2025-08-02 RX ADMIN — GABAPENTIN 900 MG: 300 CAPSULE ORAL at 05:22

## 2025-08-02 RX ADMIN — GABAPENTIN 900 MG: 300 CAPSULE ORAL at 13:46

## 2025-08-02 RX ADMIN — ACETAMINOPHEN 650 MG: 325 TABLET ORAL at 05:22

## 2025-08-02 RX ADMIN — Medication 1 TABLET: at 09:33

## 2025-08-02 RX ADMIN — FOLIC ACID 1 MG: 1 TABLET ORAL at 09:33

## 2025-08-02 RX ADMIN — BUPROPION HYDROCHLORIDE 150 MG: 150 TABLET, FILM COATED, EXTENDED RELEASE ORAL at 09:35

## 2025-08-02 ASSESSMENT — ACTIVITIES OF DAILY LIVING (ADL)
ADLS_ACUITY_SCORE: 27
ADLS_ACUITY_SCORE: 25
ADLS_ACUITY_SCORE: 27
ADLS_ACUITY_SCORE: 25

## 2025-08-02 NOTE — PLAN OF CARE
Goal Outcome Evaluation:      Plan of Care Reviewed With: patient    Overall Patient Progress: improvingOverall Patient Progress: improving    Outcome Evaluation: A&O x4. VSS on RA. CIWAs 4,4,3. LR @ 100ml/hr. SBA. On tele, NSR. Abrasion on back of scalp healing, MICHEAL. Prn Tylenol x 1 for HA. SBA. K/mag recheck this am.    /87   Pulse 82   Temp 98.6  F (37  C) (Oral)   Resp 16   Wt 55.3 kg (122 lb)   LMP  (LMP Unknown)   SpO2 96%   BMI 22.31 kg/m

## 2025-08-02 NOTE — PROGRESS NOTES
Detail Level: Detailed [facility]    Medicine Progress Note - Hospitalist Service    Date of Admission:  7/31/2025    Assessment & Plan   Maranda Figueroa is a 55 year old female who presented to the ED after she was found unconscious by her . She was then noted to be confused for about 30 minutes. She admits that she had been drinking heavily for several months and last had a drink about 2000 on 7/30. She drinks about 5-6 drinks (vodka and water) most nights. She started to have some nausea. She also had some diaphoresis, headaches, ataxia and shakes. She went to work, despite this, but started to have vomiting. She went home from work and called her  to come back home to stay with her. After that her daughter brought her kids over to her house. The last thing she remembers was her grandchildren asking to use the pool. The next thing she remembers is that she was sitting on the couch. She had pain in her arms, elbows and shoulders. She states that her  found her lying on the ground and brought her inside. Since the seizure versus fall was unwitnessed she had extensive imaging in the ED with did not reveal any acute pathology.  CTA was also done which was negative for PE.       Acute alcohol withdrawal  Seizure secondary to alcohol withdrawals  EtOH abuse  She has a history of EtOH abuse and presented to the ED after she was found unresponsive by her . She was reportedly very confused after she woke up and she has some injuries to her tongue. She admits to drinking heavily over the last 6 months and she last had an alcoholic drink about 24 hours prior to arrival. She was scoring 10 on CIWA arrival.   She also stated that she was having hallucinations (seeing colors).  Scoring 3 to 4 overnight on CIWA and continues with significant bilateral hand tremor and exhibits anxiety and with high risk due to hallucinations and likely withdrawal seizure will keep her on aggressive ciwa  protocol.    - continue CIWA protocol initiated  - seizure precautions  - c/w thiamine and folic acid  - c/w  gabapentin     Depression  Anxiety  - c/w bupropion     Elevated LFTs  Most likely secondary to EtOH abuse.   - trend LFTs     Hypertension  - c/w losartan  - c/w hydrochlorothiazide     pancytopenia  Probably secondary to EtOH abuse.   - trend CBC  - encourage EtOH cessation          Diet: Regular Diet Adult    DVT Prophylaxis: Pneumatic Compression Devices  Butler Catheter: Not present  Lines: None     Cardiac Monitoring: ACTIVE order. Indication: Chest pain/ ACS rule out (24 hours)  Code Status: Full Code      Clinically Significant Risk Factors             # Hypomagnesemia: Lowest Mg = 1.4 mg/dL in last 2 days, will replace as needed     # Thrombocytopenia: Lowest platelets = 104 in last 2 days, will monitor for bleeding   # Hypertension: Noted on problem list                       Social Drivers of Health    Tobacco Use: Medium Risk (7/31/2025)    Patient History     Smoking Tobacco Use: Former     Smokeless Tobacco Use: Never   Transportation Needs: High Risk (7/31/2025)    Transportation Needs     Within the past 12 months, has lack of transportation kept you from medical appointments, getting your medicines, non-medical meetings or appointments, work, or from getting things that you need?: Yes          Disposition Plan     Medically Ready for Discharge: Anticipated in 2-4 Days           The patient's care was discussed with the entire care team.    Michelle Avila CNP  Hospitalist Service  [facility]  Securely message with [email or web address] (more info)  Text page via [employer] Paging/Directory   ______________________________________________________________________    Interval History   No acute events overnight, somewhat shaky and with continued tremor    Physical Exam   Vital Signs: Temp: 98  F (36.7  C) Temp src: Oral BP: 134/84 Pulse: 70   Resp: 18 SpO2: 95 % O2 Device: None (Room  air)    Weight: 122 lbs 0 oz    Gen:  Lying in bed no acute distress  HEENT:  normocephalic, atraumatic, oropharynx clear  Resp:  CTA  Card:  S1,S2, RRR no murmur, rub or gallop  Abd:  Soft nondistended, non tender,  normoactive bowel sounds   Neuro:  exhibits bilateral hand tremor, slight ataxic gait, no focal deficits      Medical Decision Making       35 MINUTES SPENT BY ME on the date of service doing chart review, history, exam, documentation & further activities per the note.        Data     I have personally reviewed the following data over the past 24 hrs:    4.0  \   9.6 (L)   / 104 (L)     139 103 9.1 /  116 (H)   3.8 26 0.75 \     ALT: 50 AST: 54 (H) AP: 83 TBILI: 0.5   ALB: 3.9 TOT PROTEIN: 5.9 (L) LIPASE: N/A     INR:  1.01 PTT:  N/A   D-dimer:  N/A Fibrinogen:  N/A       Imaging results reviewed over the past 24 hrs:   No results found for this or any previous visit (from the past 24 hours).   Detail Level: Zone

## 2025-08-02 NOTE — PLAN OF CARE
Goal Outcome Evaluation:      Plan of Care Reviewed With: patient    Overall Patient Progress: improvingOverall Patient Progress: improving    Outcome Evaluation: CIWA score, 3,6, 5. Patient ambulated to hallway SBA with GB. VSS and afebrile. On room air. IVF d/c. Mg replaced and recheck with 2.7 resulted. Recheck tomorrow morning.

## 2025-08-03 VITALS
DIASTOLIC BLOOD PRESSURE: 99 MMHG | WEIGHT: 122 LBS | TEMPERATURE: 98 F | HEART RATE: 85 BPM | OXYGEN SATURATION: 99 % | BODY MASS INDEX: 22.31 KG/M2 | SYSTOLIC BLOOD PRESSURE: 136 MMHG | RESPIRATION RATE: 20 BRPM

## 2025-08-03 LAB
ANION GAP SERPL CALCULATED.3IONS-SCNC: 12 MMOL/L (ref 7–15)
BUN SERPL-MCNC: 11.8 MG/DL (ref 6–20)
CALCIUM SERPL-MCNC: 9.9 MG/DL (ref 8.8–10.4)
CHLORIDE SERPL-SCNC: 101 MMOL/L (ref 98–107)
CREAT SERPL-MCNC: 0.86 MG/DL (ref 0.51–0.95)
EGFRCR SERPLBLD CKD-EPI 2021: 80 ML/MIN/1.73M2
ERYTHROCYTE [DISTWIDTH] IN BLOOD BY AUTOMATED COUNT: 12.2 % (ref 10–15)
GLUCOSE SERPL-MCNC: 116 MG/DL (ref 70–99)
HCO3 SERPL-SCNC: 26 MMOL/L (ref 22–29)
HCT VFR BLD AUTO: 30 % (ref 35–47)
HGB BLD-MCNC: 10.1 G/DL (ref 11.7–15.7)
MAGNESIUM SERPL-MCNC: 1.7 MG/DL (ref 1.7–2.3)
MCH RBC QN AUTO: 35.4 PG (ref 26.5–33)
MCHC RBC AUTO-ENTMCNC: 33.7 G/DL (ref 31.5–36.5)
MCV RBC AUTO: 105 FL (ref 78–100)
PLATELET # BLD AUTO: 120 10E3/UL (ref 150–450)
POTASSIUM SERPL-SCNC: 4.4 MMOL/L (ref 3.4–5.3)
RBC # BLD AUTO: 2.85 10E6/UL (ref 3.8–5.2)
SODIUM SERPL-SCNC: 139 MMOL/L (ref 135–145)
WBC # BLD AUTO: 5.4 10E3/UL (ref 4–11)

## 2025-08-03 PROCEDURE — 36415 COLL VENOUS BLD VENIPUNCTURE: CPT | Performed by: NURSE PRACTITIONER

## 2025-08-03 PROCEDURE — 85014 HEMATOCRIT: CPT | Performed by: NURSE PRACTITIONER

## 2025-08-03 PROCEDURE — 99207 PR NO CHARGE LOS: CPT | Performed by: NURSE PRACTITIONER

## 2025-08-03 PROCEDURE — 250N000013 HC RX MED GY IP 250 OP 250 PS 637: Performed by: INTERNAL MEDICINE

## 2025-08-03 PROCEDURE — 250N000013 HC RX MED GY IP 250 OP 250 PS 637: Performed by: NURSE PRACTITIONER

## 2025-08-03 PROCEDURE — 82565 ASSAY OF CREATININE: CPT | Performed by: NURSE PRACTITIONER

## 2025-08-03 PROCEDURE — 99239 HOSP IP/OBS DSCHRG MGMT >30: CPT | Performed by: NURSE PRACTITIONER

## 2025-08-03 PROCEDURE — 83735 ASSAY OF MAGNESIUM: CPT | Performed by: NURSE PRACTITIONER

## 2025-08-03 RX ORDER — GABAPENTIN 300 MG/1
600 CAPSULE ORAL EVERY 8 HOURS
Qty: 12 CAPSULE | Refills: 0 | Status: SHIPPED | OUTPATIENT
Start: 2025-08-04 | End: 2025-08-06

## 2025-08-03 RX ORDER — GABAPENTIN 300 MG/1
300 CAPSULE ORAL EVERY 8 HOURS
Qty: 6 CAPSULE | Refills: 0 | Status: SHIPPED | OUTPATIENT
Start: 2025-08-06 | End: 2025-08-08

## 2025-08-03 RX ORDER — GABAPENTIN 100 MG/1
100 CAPSULE ORAL EVERY 8 HOURS
Qty: 9 CAPSULE | Refills: 0 | Status: SHIPPED | OUTPATIENT
Start: 2025-08-08

## 2025-08-03 RX ORDER — FOLIC ACID 1 MG/1
1 TABLET ORAL DAILY
Qty: 30 TABLET | Refills: 0 | Status: SHIPPED | OUTPATIENT
Start: 2025-08-04

## 2025-08-03 RX ORDER — GABAPENTIN 300 MG/1
900 CAPSULE ORAL EVERY 8 HOURS
Qty: 6 CAPSULE | Refills: 0 | Status: SHIPPED | OUTPATIENT
Start: 2025-08-03 | End: 2025-08-06

## 2025-08-03 RX ORDER — MULTIPLE VITAMINS W/ MINERALS TAB 9MG-400MCG
1 TAB ORAL DAILY
Qty: 30 TABLET | Refills: 0 | Status: SHIPPED | OUTPATIENT
Start: 2025-08-04

## 2025-08-03 RX ADMIN — Medication 1 TABLET: at 08:25

## 2025-08-03 RX ADMIN — GABAPENTIN 900 MG: 300 CAPSULE ORAL at 05:34

## 2025-08-03 RX ADMIN — HYDROCHLOROTHIAZIDE 25 MG: 25 TABLET ORAL at 08:25

## 2025-08-03 RX ADMIN — BUPROPION HYDROCHLORIDE 150 MG: 150 TABLET, FILM COATED, EXTENDED RELEASE ORAL at 08:25

## 2025-08-03 RX ADMIN — THIAMINE HCL TAB 100 MG 100 MG: 100 TAB at 08:25

## 2025-08-03 RX ADMIN — FOLIC ACID 1 MG: 1 TABLET ORAL at 08:25

## 2025-08-03 RX ADMIN — LOSARTAN POTASSIUM 100 MG: 50 TABLET, FILM COATED ORAL at 07:29

## 2025-08-03 ASSESSMENT — ACTIVITIES OF DAILY LIVING (ADL)
ADLS_ACUITY_SCORE: 25
ADLS_ACUITY_SCORE: 25
ADLS_ACUITY_SCORE: 27
ADLS_ACUITY_SCORE: 27
ADLS_ACUITY_SCORE: 25
ADLS_ACUITY_SCORE: 25
ADLS_ACUITY_SCORE: 27
ADLS_ACUITY_SCORE: 25

## 2025-08-03 NOTE — DISCHARGE INSTRUCTIONS
If you drink alcohol then do not take the gabapentin taper which was prescribed for alcohol withdrawal    Take the thiamine, folic acid and multivitamin as prescribed.  With chronic alcohol use, your body is at risk for nutritional deficiencies especially thiamine and folate.  These vitamins help prevent anemia and support neurologic and overall recovery.  You should take the thiamine and folic acid for 30 days and a multivitamin indefinitely.

## 2025-08-03 NOTE — DISCHARGE SUMMARY
[facility]  Hospitalist Discharge Summary      Date of Admission:  7/31/2025  Date of Discharge:  8/03/2025  Discharging Provider: Michelle Avila CNP  Discharge Service: Hospitalist Service      Follow-ups Needed After Discharge   Follow-up Appointments       Hospital Follow-up with Existing Primary Care Provider (PCP)          Schedule Primary Care visit within: 7 Days               Discharge Disposition   Discharged to home  Condition at discharge: Stable    Hospital Course   Maranda Figueroa is a 55 year old female who presented to the ED after she was found unconscious by her . She was then noted to be confused for about 30 minutes. She admits that she had been drinking heavily for several months and last had a drink about 2000 on 7/30. She drinks about 5-6 drinks (vodka and water) most nights. She started to have some nausea. She also had some diaphoresis, headaches, ataxia and shakes. She went to work, despite this, but started to have vomiting. She went home from work and called her  to come back home to stay with her. After that her daughter brought her kids over to her house. The last thing she remembers was her grandchildren asking to use the pool. The next thing she remembers is that she was sitting on the couch. She had pain in her arms, elbows and shoulders. She states that her  found her lying on the ground and brought her inside. Since the seizure versus fall was unwitnessed she had extensive imaging in the ED with did not reveal any acute pathology.  CTA was also done which was negative for PE.       Acute alcohol withdrawal  Seizure secondary to alcohol withdrawals  EtOH abuse  She has a history of EtOH abuse and presented to the ED after she was found unresponsive by her . She was reportedly very confused after she woke up and she has some injuries to her tongue. She admits to drinking heavily over the last 6 months and she last had an  alcoholic drink about 24 hours prior to arrival. She was scoring 10 on CIWA arrival.   She also stated that she was having hallucinations (seeing colors).  Scoring 3 to 4 overnight on CIWA.  This am only mild tremor, no hallucinations.  Will plan on discharging home to continue gabapentin taper, thiamine, folic acid and gabapentin.  Encouraged cessation.  Declines chemical dependency consultation.     Depression  Anxiety  - continue bupropion     Elevated LFTs  Most likely secondary to EtOH abuse.   - follow up outpatient     Hypertension  - c/w losartan  - c/w hydrochlorothiazide     pancytopenia  Probably secondary to EtOH abuse. Follow up outpatient  Encourage EtOH cessation.    Consultations This Hospital Stay   None    Code Status   Full Code    Time Spent on this Encounter   IMichelle CNP, personally saw the patient today and spent greater than 30 minutes discharging this patient.       Michelle Avila CNP  [address] SURGICAL  [address] [place] [address]  Phone: [phone]  ______________________________________________________________________    Physical Exam   Vital Signs: Temp: 98  F (36.7  C) Temp src: Oral BP: (!) 136/99 Pulse: 85   Resp: 20 SpO2: 99 % O2 Device: None (Room air)    Weight: 122 lbs 0 oz  Gen:  Lying in bed no acute distress  HEENT:  normocephalic, atraumatic, oropharynx clear  Resp:  CTA  Card:  S1,S2, RRR no murmur, rub or gallop  Abd:  Soft nondistended, non tender,  normoactive bowel sounds   Neuro:  Neuro exam non focal         Primary Care Physician   Sai Blackwood    Discharge Orders      Reason for your hospital stay    Alcohol withdrawal     Activity    Your activity upon discharge: activity as tolerated     Diet    Follow this diet upon discharge: Current Diet:Orders Placed This Encounter      Regular Diet Adult     Hospital Follow-up with Existing Primary Care Provider (PCP)            Significant Results and Procedures   Most  Recent 3 CBC's:  Recent Labs   Lab Test 08/03/25  0546 08/01/25  1459 08/01/25  0534   WBC 5.4 4.0 3.6*   HGB 10.1* 9.6* 9.3*   * 105* 104*   * 104* 104*     Most Recent 3 BMP's:  Recent Labs   Lab Test 08/03/25  0546 08/02/25  0518 08/01/25  0534    139 137   POTASSIUM 4.4 3.8 3.5   CHLORIDE 101 103 100   CO2 26 26 24   BUN 11.8 9.1 11.9   CR 0.86 0.75 0.76   ANIONGAP 12 10 13   SILVA 9.9 9.2 8.7*   * 116* 100*   ,   Results for orders placed or performed during the hospital encounter of 07/31/25   CT Head w/o Contrast    Narrative    EXAM: CT HEAD W/O CONTRAST, CT CERVICAL SPINE W/O CONTRAST  LOCATION: [facility]  DATE: 7/31/2025    INDICATION: Fall, loss of consciousness, possible seizure.  COMPARISON: None.  TECHNIQUE:   1) Routine CT Head without IV contrast. Multiplanar reformats. Dose reduction techniques were used.  2) Routine CT Cervical Spine without IV contrast. Multiplanar reformats. Dose reduction techniques were used.    FINDINGS:   HEAD CT:   INTRACRANIAL CONTENTS: No intracranial hemorrhage, extraaxial collection, or mass effect.  No CT evidence of acute infarct. Mild presumed chronic small vessel ischemic changes. Mild generalized volume loss. No hydrocephalus.     VISUALIZED ORBITS/SINUSES/MASTOIDS: No intraorbital abnormality. No paranasal sinus mucosal disease. No middle ear or mastoid effusion. Status post left canal wall down mastoidectomy.    BONES/SOFT TISSUES: High right posterior scalp soft tissue swelling/contusion and laceration. No calvarial fracture.    CERVICAL SPINE CT:   VERTEBRA:  No definite acute fracture or posttraumatic subluxation.     CANAL/FORAMINA: Multilevel degenerative changes. No high-grade canal stenosis. No high-grade neural foraminal stenosis.    PARASPINAL: No extraspinal abnormality. Visualized lung fields are clear.      Impression    IMPRESSION:  HEAD CT:  1.  No CT evidence for acute intracranial  process.  2.  Brain atrophy and presumed chronic microvascular ischemic changes as above.    CERVICAL SPINE CT:  1. No CT evidence for acute fracture or posttraumatic subluxation.     CT Cervical Spine w/o Contrast    Narrative    EXAM: CT HEAD W/O CONTRAST, CT CERVICAL SPINE W/O CONTRAST  LOCATION: [facility]  DATE: 7/31/2025    INDICATION: Fall, loss of consciousness, possible seizure.  COMPARISON: None.  TECHNIQUE:   1) Routine CT Head without IV contrast. Multiplanar reformats. Dose reduction techniques were used.  2) Routine CT Cervical Spine without IV contrast. Multiplanar reformats. Dose reduction techniques were used.    FINDINGS:   HEAD CT:   INTRACRANIAL CONTENTS: No intracranial hemorrhage, extraaxial collection, or mass effect.  No CT evidence of acute infarct. Mild presumed chronic small vessel ischemic changes. Mild generalized volume loss. No hydrocephalus.     VISUALIZED ORBITS/SINUSES/MASTOIDS: No intraorbital abnormality. No paranasal sinus mucosal disease. No middle ear or mastoid effusion. Status post left canal wall down mastoidectomy.    BONES/SOFT TISSUES: High right posterior scalp soft tissue swelling/contusion and laceration. No calvarial fracture.    CERVICAL SPINE CT:   VERTEBRA:  No definite acute fracture or posttraumatic subluxation.     CANAL/FORAMINA: Multilevel degenerative changes. No high-grade canal stenosis. No high-grade neural foraminal stenosis.    PARASPINAL: No extraspinal abnormality. Visualized lung fields are clear.      Impression    IMPRESSION:  HEAD CT:  1.  No CT evidence for acute intracranial process.  2.  Brain atrophy and presumed chronic microvascular ischemic changes as above.    CERVICAL SPINE CT:  1. No CT evidence for acute fracture or posttraumatic subluxation.     Elbow XR,  2 views, left    Narrative    EXAM: XR ELBOW LEFT 2 VIEWS, XR ELBOW RIGHT 2 VIEWS  LOCATION: [facility]  DATE:  7/31/2025    INDICATION: Bilateral elbow pain.  COMPARISON: None.      Impression    IMPRESSION: Normal joint spaces and alignment. No fracture or joint effusion.   Elbow XR, 2 views, right    Narrative    EXAM: XR ELBOW LEFT 2 VIEWS, XR ELBOW RIGHT 2 VIEWS  LOCATION: [facility]  DATE: 7/31/2025    INDICATION: Bilateral elbow pain.  COMPARISON: None.      Impression    IMPRESSION: Normal joint spaces and alignment. No fracture or joint effusion.   CT Chest Pulmonary Embolism w Contrast    Narrative    EXAM: CT CHEST PULMONARY EMBOLISM W CONTRAST  LOCATION: [facility]  DATE: 7/31/2025    INDICATION: syncope, elevated trop  COMPARISON: None.  TECHNIQUE: CT chest pulmonary angiogram during arterial phase injection of IV contrast. Multiplanar reformats and MIP reconstructions were performed. Dose reduction techniques were used.   CONTRAST: Isovue 370, 51ml    FINDINGS:  ANGIOGRAM CHEST: Pulmonary arteries are normal caliber and negative for pulmonary emboli. Thoracic aorta is negative for dissection. No CT evidence of right heart strain.    LUNGS AND PLEURA: Minimal dependent atelectasis, lungs otherwise clear. No infiltrates or effusions.    MEDIASTINUM/AXILLAE: Normal. No adenopathy.    CORONARY ARTERY CALCIFICATION: None.    UPPER ABDOMEN: Fatty infiltration of the liver.    MUSCULOSKELETAL: Multiple old left rib fractures.      Impression    IMPRESSION:  1.  No evidence for pulmonary emboli.  2.  No evidence for acute pulmonary disease.       Discharge Medications      Review of your medicines        START taking        Dose / Directions   folic acid 1 MG tablet  Commonly known as: FOLVITE      Dose: 1 mg  Start taking on: August 4, 2025  Take 1 tablet (1 mg) by mouth daily.  Quantity: 30 tablet  Refills: 0     * gabapentin 300 MG capsule  Commonly known as: NEURONTIN      Dose: 900 mg  Take 3 capsules (900 mg) by mouth every 8 hours for 2  doses.  Quantity: 6 capsule  Refills: 0     * gabapentin 300 MG capsule  Commonly known as: NEURONTIN      Dose: 600 mg  Start taking on: August 4, 2025  Take 2 capsules (600 mg) by mouth every 8 hours for 6 doses.  Quantity: 12 capsule  Refills: 0     * gabapentin 300 MG capsule  Commonly known as: NEURONTIN      Dose: 300 mg  Start taking on: August 6, 2025  Take 1 capsule (300 mg) by mouth every 8 hours for 6 doses.  Quantity: 6 capsule  Refills: 0     * gabapentin 100 MG capsule  Commonly known as: NEURONTIN      Dose: 100 mg  Start taking on: August 8, 2025  Take 1 capsule (100 mg) by mouth every 8 hours.  Quantity: 9 capsule  Refills: 0     multivitamin w/minerals tablet      Dose: 1 tablet  Start taking on: August 4, 2025  Take 1 tablet by mouth daily.  Quantity: 30 tablet  Refills: 0           * This list has 4 medication(s) that are the same as other medications prescribed for you. Read the directions carefully, and ask your doctor or other care provider to review them with you.                CONTINUE these medicines which have NOT CHANGED        Dose / Directions   buPROPion 150 MG 12 hr tablet  Commonly known as: WELLBUTRIN SR  Used for: Major depressive disorder, recurrent episode, moderate (H), SILVIA (generalized anxiety disorder)      Dose: 150 mg  TAKE ONE TABLET BY MOUTH TWICE A DAY  Quantity: 180 tablet  Refills: 4     hydrochlorothiazide 25 MG tablet  Commonly known as: HYDRODIURIL  Used for: Intractable chronic migraine without aura and with status migrainosus, HTN, goal below 140/90      Dose: 25 mg  TAKE ONE TABLET BY MOUTH ONCE DAILY  Quantity: 90 tablet  Refills: 1     losartan 100 MG tablet  Commonly known as: COZAAR      Dose: 100 mg  Take 100 mg by mouth daily.  Refills: 0     omeprazole 20 MG DR capsule  Commonly known as: PriLOSEC      Dose: 20 mg  Take 20 mg by mouth daily at 2 pm.  Refills: 0     thiamine 100 MG tablet  Commonly known as: B-1  Used for: Uncomplicated alcohol dependence  (H)      Dose: 100 mg  TAKE ONE TABLET BY MOUTH ONCE DAILY  Quantity: 90 tablet  Refills: 0               Where to get your medicines        These medications were sent to [facility]  [place] - [address]  [address] [address]      Phone: [phone]   folic acid 1 MG tablet  gabapentin 100 MG capsule  gabapentin 300 MG capsule  gabapentin 300 MG capsule  gabapentin 300 MG capsule  multivitamin w/minerals tablet       Allergies   Allergies   Allergen Reactions    Penicillins Nausea and Vomiting

## 2025-08-03 NOTE — PLAN OF CARE
Goal Outcome Evaluation:      Plan of Care Reviewed With: patient    Overall Patient Progress: improvingOverall Patient Progress: improving    Outcome Evaluation: A&O x 4. VSS on RA. CIWAs 3, 4, 3 . Denies any pain. Ambulated out in the hallways multiple times, SBA w/ GB. K/Mag at goal this am. On tlele showing NSR.    BP (!) 137/99 (BP Location: Right arm)   Pulse 91   Temp 97.8  F (36.6  C) (Oral)   Resp 20   Wt 55.3 kg (122 lb)   LMP  (LMP Unknown)   SpO2 99%   BMI 22.31 kg/m

## 2025-08-03 NOTE — PROGRESS NOTES
S-(situation): Patient discharged to home via private car with spouse    B-(background): Acute alcohol withdrawal  Seizure secondary to alcohol withdrawals  EtOH abuse    A-(assessment): A and Ox4. Vss and afebrile. On room air. Independent. CIWA score of 2. No complaint of pain.    R-(recommendations): Discharge instructions reviewed with patient. Listed belongings gathered and returned to patient.          Discharge Nursing Criteria:   Patient Education given and documented: (STROKE, CHF, Diabetes):  {NA   Care Plan and Patient education resolved: Yes    New Medications- pt has been educated about purpose and side effects: Yes    Vaccines  Influenza status verified at discharge:  Not Applicable    Intentionally Retained Items No    MISC  Prescriptions if needed, hard copies sent with patient  NA  Home medications returned to patient: NA  Medication Bin checked and emptied on discharge Yes  Patient reports post-discharge pain management plan is effective: Yes

## 2025-08-04 NOTE — PROGRESS NOTES
[facility]    Medicine Progress Note - Hospitalist Service    Date of Admission:  7/31/2025    Patient called the nursing unit wishing to speak with me with regard to her diagnosis.  She states she is concerned because she did not tell anyone she is a daily alcohol user and states she was not going through withdrawal.  I have explained that when there is suspicion of alcohol withdrawal we must treat this as this can be life threatening.    I have encouraged her to contact patient relationsions.    Michelle Avila, [facility]  Hospitalist Service  [facility]  Securely message with [employer] (more info)  Text page via [facility] Paging/Directory   ______________________________________________________________________

## 2025-08-05 ENCOUNTER — PATIENT OUTREACH (OUTPATIENT)
Dept: CARE COORDINATION | Facility: CLINIC | Age: 55
End: 2025-08-05
Payer: COMMERCIAL

## 2025-08-06 ENCOUNTER — VIRTUAL VISIT (OUTPATIENT)
Dept: FAMILY MEDICINE | Facility: CLINIC | Age: 55
End: 2025-08-06
Payer: COMMERCIAL

## 2025-08-06 DIAGNOSIS — I10 HTN, GOAL BELOW 140/90: ICD-10-CM

## 2025-08-06 DIAGNOSIS — D69.6 THROMBOCYTOPENIA: ICD-10-CM

## 2025-08-06 DIAGNOSIS — F10.930 ALCOHOL WITHDRAWAL SEIZURE WITHOUT COMPLICATION (H): ICD-10-CM

## 2025-08-06 DIAGNOSIS — E83.42 HYPOMAGNESEMIA: ICD-10-CM

## 2025-08-06 DIAGNOSIS — Z09 HOSPITAL DISCHARGE FOLLOW-UP: Primary | ICD-10-CM

## 2025-08-06 DIAGNOSIS — F10.10 ALCOHOL ABUSE: ICD-10-CM

## 2025-08-06 DIAGNOSIS — R56.9 ALCOHOL WITHDRAWAL SEIZURE WITHOUT COMPLICATION (H): ICD-10-CM

## 2025-08-06 DIAGNOSIS — R79.89 ELEVATED LFTS: ICD-10-CM

## 2025-08-06 DIAGNOSIS — D53.1 MEGALOBLASTIC ANEMIA: ICD-10-CM

## 2025-08-06 DIAGNOSIS — F10.939 ALCOHOL WITHDRAWAL, WITH UNSPECIFIED COMPLICATION (H): ICD-10-CM

## 2025-08-06 PROCEDURE — 1111F DSCHRG MED/CURRENT MED MERGE: CPT | Mod: 95 | Performed by: STUDENT IN AN ORGANIZED HEALTH CARE EDUCATION/TRAINING PROGRAM

## 2025-08-06 PROCEDURE — 98005 SYNCH AUDIO-VIDEO EST LOW 20: CPT | Performed by: STUDENT IN AN ORGANIZED HEALTH CARE EDUCATION/TRAINING PROGRAM

## 2025-08-06 ASSESSMENT — PATIENT HEALTH QUESTIONNAIRE - PHQ9
10. IF YOU CHECKED OFF ANY PROBLEMS, HOW DIFFICULT HAVE THESE PROBLEMS MADE IT FOR YOU TO DO YOUR WORK, TAKE CARE OF THINGS AT HOME, OR GET ALONG WITH OTHER PEOPLE: NOT DIFFICULT AT ALL
SUM OF ALL RESPONSES TO PHQ QUESTIONS 1-9: 4
SUM OF ALL RESPONSES TO PHQ QUESTIONS 1-9: 4

## 2025-08-11 ENCOUNTER — PATIENT OUTREACH (OUTPATIENT)
Dept: CARE COORDINATION | Facility: CLINIC | Age: 55
End: 2025-08-11
Payer: COMMERCIAL